# Patient Record
Sex: MALE | Race: WHITE | Employment: FULL TIME | URBAN - METROPOLITAN AREA
[De-identification: names, ages, dates, MRNs, and addresses within clinical notes are randomized per-mention and may not be internally consistent; named-entity substitution may affect disease eponyms.]

---

## 2017-05-02 ENCOUNTER — HOSPITAL ENCOUNTER (EMERGENCY)
Facility: CLINIC | Age: 47
Discharge: OTHER INSTITUTION | End: 2017-05-02
Attending: EMERGENCY MEDICINE
Payer: COMMERCIAL

## 2017-05-02 VITALS
OXYGEN SATURATION: 98 % | HEIGHT: 74 IN | RESPIRATION RATE: 16 BRPM | SYSTOLIC BLOOD PRESSURE: 142 MMHG | WEIGHT: 165 LBS | DIASTOLIC BLOOD PRESSURE: 78 MMHG | BODY MASS INDEX: 21.17 KG/M2 | HEART RATE: 76 BPM | TEMPERATURE: 98.6 F

## 2017-05-02 DIAGNOSIS — S40.021D HEMATOMA OF ARM, RIGHT, SUBSEQUENT ENCOUNTER: ICD-10-CM

## 2017-05-02 DIAGNOSIS — M25.421 ELBOW SWELLING, RIGHT: Primary | ICD-10-CM

## 2017-05-02 PROCEDURE — 6360000002 HC RX W HCPCS: Performed by: EMERGENCY MEDICINE

## 2017-05-02 PROCEDURE — 99284 EMERGENCY DEPT VISIT MOD MDM: CPT

## 2017-05-02 PROCEDURE — 96374 THER/PROPH/DIAG INJ IV PUSH: CPT

## 2017-05-02 RX ORDER — ONDANSETRON 4 MG/1
4 TABLET, ORALLY DISINTEGRATING ORAL ONCE
Status: COMPLETED | OUTPATIENT
Start: 2017-05-02 | End: 2017-05-02

## 2017-05-02 RX ORDER — MORPHINE SULFATE 2 MG/ML
2 INJECTION, SOLUTION INTRAMUSCULAR; INTRAVENOUS ONCE
Status: COMPLETED | OUTPATIENT
Start: 2017-05-02 | End: 2017-05-02

## 2017-05-02 RX ORDER — MORPHINE SULFATE 2 MG/ML
2 INJECTION, SOLUTION INTRAMUSCULAR; INTRAVENOUS ONCE
Status: DISCONTINUED | OUTPATIENT
Start: 2017-05-02 | End: 2017-05-02

## 2017-05-02 RX ADMIN — MORPHINE SULFATE 2 MG: 2 INJECTION, SOLUTION INTRAMUSCULAR; INTRAVENOUS at 20:03

## 2017-05-02 RX ADMIN — ONDANSETRON 4 MG: 4 TABLET, ORALLY DISINTEGRATING ORAL at 20:03

## 2017-05-02 ASSESSMENT — ENCOUNTER SYMPTOMS
GASTROINTESTINAL NEGATIVE: 1
RESPIRATORY NEGATIVE: 1
EYES NEGATIVE: 1

## 2017-05-02 ASSESSMENT — PAIN SCALES - GENERAL
PAINLEVEL_OUTOF10: 7

## 2017-05-02 ASSESSMENT — PAIN DESCRIPTION - LOCATION
LOCATION: ELBOW;ARM
LOCATION: ARM;ELBOW

## 2017-05-02 ASSESSMENT — PAIN DESCRIPTION - FREQUENCY
FREQUENCY: CONTINUOUS
FREQUENCY: CONTINUOUS

## 2017-05-02 ASSESSMENT — PAIN DESCRIPTION - PAIN TYPE
TYPE: ACUTE PAIN
TYPE: ACUTE PAIN

## 2017-05-02 ASSESSMENT — PAIN DESCRIPTION - DESCRIPTORS
DESCRIPTORS: THROBBING
DESCRIPTORS: SHARP;THROBBING

## 2017-05-02 ASSESSMENT — PAIN DESCRIPTION - ORIENTATION
ORIENTATION: RIGHT
ORIENTATION: RIGHT

## 2017-05-02 ASSESSMENT — PAIN DESCRIPTION - ONSET: ONSET: ON-GOING

## 2017-05-02 ASSESSMENT — PAIN DESCRIPTION - PROGRESSION: CLINICAL_PROGRESSION: NOT CHANGED

## 2017-05-07 ENCOUNTER — HOSPITAL ENCOUNTER (EMERGENCY)
Facility: CLINIC | Age: 47
Discharge: HOME | End: 2017-05-07
Attending: EMERGENCY MEDICINE
Payer: COMMERCIAL

## 2017-05-07 VITALS
TEMPERATURE: 98.2 F | HEIGHT: 74 IN | SYSTOLIC BLOOD PRESSURE: 140 MMHG | BODY MASS INDEX: 20.53 KG/M2 | WEIGHT: 160 LBS | HEART RATE: 113 BPM | DIASTOLIC BLOOD PRESSURE: 114 MMHG | RESPIRATION RATE: 20 BRPM | OXYGEN SATURATION: 99 %

## 2017-05-07 DIAGNOSIS — G56.91 NEUROPATHY OF FOREARM, RIGHT: Primary | ICD-10-CM

## 2017-05-07 PROCEDURE — 99283 EMERGENCY DEPT VISIT LOW MDM: CPT

## 2017-05-07 RX ORDER — CYCLOBENZAPRINE HCL 10 MG
10 TABLET ORAL 3 TIMES DAILY PRN
Qty: 30 TABLET | Refills: 0 | Status: SHIPPED | OUTPATIENT
Start: 2017-05-07 | End: 2017-05-17

## 2017-05-07 RX ORDER — PREDNISONE 20 MG/1
20 TABLET ORAL DAILY
Qty: 10 TABLET | Refills: 0 | Status: SHIPPED | OUTPATIENT
Start: 2017-05-07 | End: 2017-05-15

## 2017-05-07 RX ORDER — HYDROCODONE BITARTRATE AND ACETAMINOPHEN 5; 325 MG/1; MG/1
1 TABLET ORAL EVERY 6 HOURS PRN
Qty: 10 TABLET | Refills: 0 | Status: SHIPPED | OUTPATIENT
Start: 2017-05-07 | End: 2017-05-14

## 2017-05-07 ASSESSMENT — PAIN DESCRIPTION - PROGRESSION: CLINICAL_PROGRESSION: NOT CHANGED

## 2017-05-07 ASSESSMENT — PAIN DESCRIPTION - DESCRIPTORS: DESCRIPTORS: THROBBING;BURNING

## 2017-05-07 ASSESSMENT — PAIN DESCRIPTION - FREQUENCY: FREQUENCY: CONTINUOUS

## 2017-05-07 ASSESSMENT — PAIN SCALES - GENERAL: PAINLEVEL_OUTOF10: 10

## 2017-05-07 ASSESSMENT — PAIN DESCRIPTION - ONSET: ONSET: ON-GOING

## 2017-05-07 ASSESSMENT — PAIN DESCRIPTION - PAIN TYPE: TYPE: ACUTE PAIN

## 2017-05-07 ASSESSMENT — PAIN DESCRIPTION - ORIENTATION: ORIENTATION: RIGHT

## 2017-05-15 ENCOUNTER — HOSPITAL ENCOUNTER (EMERGENCY)
Facility: CLINIC | Age: 47
Discharge: HOME | End: 2017-05-15
Attending: EMERGENCY MEDICINE
Payer: COMMERCIAL

## 2017-05-15 VITALS
WEIGHT: 160 LBS | TEMPERATURE: 97.8 F | OXYGEN SATURATION: 97 % | SYSTOLIC BLOOD PRESSURE: 145 MMHG | BODY MASS INDEX: 20.53 KG/M2 | HEART RATE: 122 BPM | RESPIRATION RATE: 16 BRPM | DIASTOLIC BLOOD PRESSURE: 112 MMHG | HEIGHT: 74 IN

## 2017-05-15 DIAGNOSIS — M25.521 ELBOW PAIN, CHRONIC, RIGHT: Primary | ICD-10-CM

## 2017-05-15 DIAGNOSIS — G89.29 ELBOW PAIN, CHRONIC, RIGHT: Primary | ICD-10-CM

## 2017-05-15 PROCEDURE — 6360000002 HC RX W HCPCS: Performed by: EMERGENCY MEDICINE

## 2017-05-15 PROCEDURE — 96372 THER/PROPH/DIAG INJ SC/IM: CPT

## 2017-05-15 PROCEDURE — 99283 EMERGENCY DEPT VISIT LOW MDM: CPT

## 2017-05-15 RX ORDER — KETOROLAC TROMETHAMINE 30 MG/ML
60 INJECTION, SOLUTION INTRAMUSCULAR; INTRAVENOUS ONCE
Status: COMPLETED | OUTPATIENT
Start: 2017-05-15 | End: 2017-05-15

## 2017-05-15 RX ORDER — PREDNISONE 20 MG/1
20 TABLET ORAL 2 TIMES DAILY
Qty: 10 TABLET | Refills: 0 | Status: SHIPPED | OUTPATIENT
Start: 2017-05-15 | End: 2017-05-20

## 2017-05-15 RX ORDER — METHYLPREDNISOLONE ACETATE 40 MG/ML
80 INJECTION, SUSPENSION INTRA-ARTICULAR; INTRALESIONAL; INTRAMUSCULAR; SOFT TISSUE ONCE
Status: COMPLETED | OUTPATIENT
Start: 2017-05-15 | End: 2017-05-15

## 2017-05-15 RX ORDER — KETOROLAC TROMETHAMINE 30 MG/ML
60 INJECTION, SOLUTION INTRAMUSCULAR; INTRAVENOUS ONCE
Status: DISCONTINUED | OUTPATIENT
Start: 2017-05-15 | End: 2017-05-15

## 2017-05-15 RX ORDER — HYDROCODONE BITARTRATE AND ACETAMINOPHEN 5; 325 MG/1; MG/1
1 TABLET ORAL EVERY 6 HOURS PRN
Qty: 10 TABLET | Refills: 0 | Status: SHIPPED | OUTPATIENT
Start: 2017-05-15 | End: 2017-05-22

## 2017-05-15 RX ADMIN — KETOROLAC TROMETHAMINE 60 MG: 30 INJECTION, SOLUTION INTRAMUSCULAR at 13:37

## 2017-05-15 RX ADMIN — METHYLPREDNISOLONE ACETATE 80 MG: 40 INJECTION, SUSPENSION INTRA-ARTICULAR; INTRALESIONAL; INTRAMUSCULAR; SOFT TISSUE at 13:38

## 2017-05-15 ASSESSMENT — PAIN SCALES - GENERAL
PAINLEVEL_OUTOF10: 8
PAINLEVEL_OUTOF10: 10
PAINLEVEL_OUTOF10: 10

## 2017-05-15 ASSESSMENT — PAIN DESCRIPTION - PAIN TYPE
TYPE: ACUTE PAIN
TYPE: ACUTE PAIN

## 2017-05-15 ASSESSMENT — ENCOUNTER SYMPTOMS
RESPIRATORY NEGATIVE: 1
GASTROINTESTINAL NEGATIVE: 1
EYES NEGATIVE: 1

## 2017-05-15 ASSESSMENT — PAIN DESCRIPTION - LOCATION
LOCATION: ELBOW
LOCATION: ELBOW

## 2017-05-15 ASSESSMENT — PAIN DESCRIPTION - ORIENTATION: ORIENTATION: RIGHT

## 2017-05-15 NOTE — ED AVS SNAPSHOT
After Visit Summary  (Discharge Instructions)    Medication List for Home    Based on the information you provided to us as well as any changes during this visit, the following is your updated medication list.  Compare this with your prescription bottles at home. If you have any questions or concerns, contact your primary care physician's office. Daily Medication List (This medication list can be shared with any Healthcare provider who is helping you manage your medications)      There are NEW medications for you. START taking them after you leave the hospital     HYDROcodone-acetaminophen 5-325 MG per tablet   Commonly known as:  NORCO   Take 1 tablet by mouth every 6 hours as needed for Pain . You told us you were taking these medications at home, but the amount or how often you take this medication has CHANGED     predniSONE 20 MG tablet   Commonly known as:  DELTASONE   Take 1 tablet by mouth 2 times daily for 5 days   What changed:  when to take this         ASK your doctor about these medications if you have questions     albuterol (2.5 MG/3ML) 0.083% nebulizer solution   Commonly known as:  PROVENTIL   Take 2.5 mg by nebulization every 6 hours as needed for Wheezing       cyclobenzaprine 10 MG tablet   Commonly known as:  FLEXERIL   Take 1 tablet by mouth 3 times daily as needed for Muscle spasms       esomeprazole 40 MG delayed release capsule   Commonly known as:  NEXIUM   Take 1 capsule by mouth daily            Where to Get Your Medications      You can get these medications from any pharmacy     Bring a paper prescription for each of these medications     HYDROcodone-acetaminophen 5-325 MG per tablet    predniSONE 20 MG tablet               Allergies as of 5/15/2017     No Known Allergies      Immunizations as of 5/15/2017     Name Date Dose VIS Date Route    Tdap (Boostrix, Adacel) 9/7/2015 -- -- --         After Visit Summary    This summary was created for you. Thank you for entrusting your care to us. The following information includes details about your hospital/visit stay along with steps you should take to help with your recovery once you leave the hospital.  In this packet, you will find information about the topics listed below:    · Instructions about your medications including a list of your home medications  · A summary of your hospital visit  · Follow-up appointments once you have left the hospital  · Your care plan at home      You may receive a survey regarding the care you received during your stay. Your input is valuable to us. We encourage you to complete and return your survey in the envelope provided. We hope you will choose us in the future for your healthcare needs. Patient Information     Patient Name CORAL Ceballos 1970      Care Provided at:     Name             [address]              Your Visit    Here you will find information about your visit, including the reason for your visit. Please take this sheet with you when you visit your doctor or other health care provider in the future. It will help determine the best possible medical care for you at that time. If you have any questions once you leave the hospital, please call the department phone number listed below. Diagnoses this visit     Your diagnosis was ELBOW PAIN, CHRONIC, RIGHT. Visit Information     Date of Visit Department Dept Phone    5/15/2017 [facility] [phone]      You were seen by     You were seen by Liz Holt MD.       Follow-up Appointments    Below is a list of your follow-up and future appointments. This may not be a complete list as you may have made appointments directly with providers that we are not aware of or your providers may have made some for you. Please call your providers to confirm appointments. It is important to keep your appointments.  Please bring your current insurance card, photo ID, co-pay, and all medication bottles to your appointment. If self-pay, payment is expected at the time of service. Follow-up Information     Follow up with Isabella Goel M.D. In 2 days. Specialties:  Psychiatry, Neurology    Contact information:    [address] [facility] [address]  [address] [place] [address]  [phone]          Follow up with Jay Regalado MD In 2 days. Specialty:  Family Medicine    Contact information:    [address] [facility],[address]  [address] [facility] [phone]        Future Appointments     6/12/2017 1:15 PM     Appointment with Ashley Li MD at [facility] ([phone])   Please arrive 15 minutes prior to appointment, bring photo ID and insurance card. [address] Once You Return Home    This section includes instructions you will need to follow once you leave the hospital.  Your care team will discuss these with you, so you and those caring for you know how to best care for your health needs at home. This section may also include educational information about certain health topics that may be of help to you. Important Information if you smoke or are exposed to smoking       SMOKING: QUIT SMOKING. THIS IS THE MOST IMPORTANT ACTION YOU CAN TAKE TO IMPROVE YOUR CURRENT AND FUTURE HEALTH. Call the [address] [place] [address] at [place] NOW ([phone])    Smoking harms nonsmokers. When nonsmokers are around people who smoke, they absorb nicotine, carbon monoxide, and other ingredients of tobacco smoke. DO NOT SMOKE AROUND CHILDREN     Children exposed to secondhand smoke are at an increased risk of:  Sudden Infant Death Syndrome (SIDS), acute respiratory infections, inflammation of the middle ear, and severe asthma. Over a longer time, it causes heart disease and lung cancer. There is no safe level of exposure to secondhand smoke.                 MyChart Signup · Can disrupt your work, hobbies, home life, and relationships with friends and family. Chronic pain is a very real condition. It is not just in your head. Treatment can help and usually includes several methods used together, such as medicines, physical therapy, exercise, and other treatments. Learning how to relax and changing negative thought patterns can also help you cope. Chronic pain is complex. Taking an active role in your treatment will help you better manage your pain. Tell your doctor if you have trouble dealing with your pain. You may have to try several things before you find what works best for you. Follow-up care is a key part of your treatment and safety. Be sure to make and go to all appointments, and call your doctor if you are having problems. Its also a good idea to know your test results and keep a list of the medicines you take. How can you care for yourself at home? · Pace yourself. Break up large jobs into smaller tasks. Save harder tasks for days when you have less pain, or go back and forth between hard tasks and easier ones. Take rest breaks. · Relax, and reduce stress. Relaxation techniques such as deep breathing or meditation can help. · Keep moving. Gentle, daily exercise can help reduce pain over the long run. Try low- or no-impact exercises such as walking, swimming, and stationary biking. Do stretches to stay flexible. · Try heat, cold packs, and massage. · Get enough sleep. Chronic pain can make you tired and drain your energy. Talk with your doctor if you have trouble sleeping because of pain. · Think positive. Your thoughts can affect your pain level. Do things that you enjoy to distract yourself when you have pain instead of focusing on the pain. See a movie, read a book, listen to music, or spend time with a friend. · If you think you are depressed, talk to your doctor about treatment. · Keep a daily pain diary.  Record how your moods, thoughts, sleep ¨ Blood in the anal area, in your stool, or on the toilet paper. ¨ Diarrhea for more than 24 hours. Where can you learn more? Go to https://[employer]spepicAirInSpace.FoodFan. org and sign in to your [employer] account. Enter N004 in the [facility] box to learn more about \"Chronic Pain: Care Instructions. \"     If you do not have an account, please click on the \"Sign Up Now\" link. Current as of: October 14, 2016  Content Version: 11.2  © 2857-8188 [employer]. Care instructions adapted under license by [facility] ([facility]). If you have questions about a medical condition or this instruction, always ask your healthcare professional. [address] any warranty or liability for your use of this information. Joint Pain: Care Instructions  Your Care Instructions  Many people have small aches and pains from overuse or injury to muscles and joints. Joint injuries often happen during sports or recreation, work tasks, or projects around the home. An overuse injury can happen when you put too much stress on a joint or when you do an activity that stresses the joint over and over, such as using the computer or rowing a boat. You can take action at home to help your muscles and joints get better. You should feel better in 1 to 2 weeks, but it can take 3 months or more to heal completely. Follow-up care is a key part of your treatment and safety. Be sure to make and go to all appointments, and call your doctor if you are having problems. It's also a good idea to know your test results and keep a list of the medicines you take. How can you care for yourself at home? · Do not put weight on the injured joint for at least a day or two. · For the first day or two after an injury, do not take hot showers or baths, and do not use hot packs. The heat could make swelling worse. · Put ice or a cold pack on the sore joint for 10 to 20 minutes at a time. Try to do this every 1 to 2 hours for the next 3 days (when you are awake) or until the swelling goes down. Put a thin cloth between the ice and your skin. · Wrap the injury in an elastic bandage. Do not wrap it too tightly because this can cause more swelling. · Prop up the sore joint on a pillow when you ice it or anytime you sit or lie down during the next 3 days. Try to keep it above the level of your heart. This will help reduce swelling. · Take an over-the-counter pain medicine, such as acetaminophen (Tylenol), ibuprofen (Advil, Motrin), or naproxen (Aleve). Read and follow all instructions on the label. · After 1 or 2 days of rest, begin moving the joint gently. While the joint is still healing, you can begin to exercise using activities that do not strain or hurt the painful joint. When should you call for help? Call your doctor now or seek immediate medical care if:  · You have signs of infection, such as:  ¨ Increased pain, swelling, warmth, and redness. ¨ Red streaks leading from the joint. ¨ A fever. Watch closely for changes in your health, and be sure to contact your doctor if:  · Your movement or symptoms are not getting better after 1 to 2 weeks of home treatment. Where can you learn more? Go to https://ISORG."[employer]. org and sign in to your spotflux account. Enter P205 in the [facility] box to learn more about \"Joint Pain: Care Instructions. \"     If you do not have an account, please click on the \"Sign Up Now\" link. Current as of: May 23, 2016  Content Version: 11.2  © 4048-8937 [employer]. Care instructions adapted under license by [facility] ([facility]). If you have questions about a medical condition or this instruction, always ask your healthcare professional. [address]  any warranty or liability for your use of this information.          Learning About Safe Use of Long-Acting Opioids  Introduction ¨ Do not take opioids with other medicines that make you sleepy or relaxed. Taking both can be dangerous. · Talk to your doctor about a naloxone rescue kit. A kit can help you, and even save your life, if you take too much of an opioid. Possible side effects  All medicines have side effects. But many people don't feel the side effects, or they are able to deal with them. You may:  · Feel confused or have a hard time thinking clearly. · Be constipated. · Feel faint, dizzy, or lightheaded. · Feel drowsy. · Feel sick to your stomach or vomit. · Have an allergic reaction. What to know about taking this medicine  · When you take an opioid regularly, your body gets used to it. This can lead to tolerance and physical dependence. These are not the same as addiction. ¨ Tolerance means that, over time, you may need to take more of the drug to keep getting the same amount of pain relief. The danger is that tolerance greatly increases your risk of overdose, breathing emergencies, and death. ¨ If you are physically dependent on an opioid, you may have withdrawal symptoms when you stop taking it. These include nausea, sweating, chills, diarrhea, and shaking. You can avoid these symptoms if you gradually stop taking the opioid over a set period of time. Your doctor can help you. ¨ Addiction is a chronic illness that makes you crave a substance, such as a drug or alcohol. When you are addicted to a substance, you have a hard time stopping yourself from using it even when you can see it causes harm. · You have a small risk of addiction when you take opioids. Your risk is greater if you have a history of substance use problems. Others who are more at risk for addiction are teenagers, older adults, people who have depression, and those who take high doses of medicine. · Ask for written instructions from your doctor or pharmacist about how to safely get rid of any medicine that's left over.

## 2017-06-12 ENCOUNTER — OFFICE VISIT (OUTPATIENT)
Dept: GASTROENTEROLOGY | Age: 47
End: 2017-06-12
Payer: COMMERCIAL

## 2017-06-12 VITALS
DIASTOLIC BLOOD PRESSURE: 78 MMHG | HEART RATE: 88 BPM | RESPIRATION RATE: 14 BRPM | OXYGEN SATURATION: 99 % | TEMPERATURE: 98.1 F | WEIGHT: 161.8 LBS | SYSTOLIC BLOOD PRESSURE: 108 MMHG | BODY MASS INDEX: 20.76 KG/M2 | HEIGHT: 74 IN

## 2017-06-12 DIAGNOSIS — K44.9 HIATAL HERNIA: ICD-10-CM

## 2017-06-12 DIAGNOSIS — K21.9 GASTROESOPHAGEAL REFLUX DISEASE WITHOUT ESOPHAGITIS: Primary | ICD-10-CM

## 2017-06-12 PROCEDURE — 1036F TOBACCO NON-USER: CPT | Performed by: INTERNAL MEDICINE

## 2017-06-12 PROCEDURE — G8427 DOCREV CUR MEDS BY ELIG CLIN: HCPCS | Performed by: INTERNAL MEDICINE

## 2017-06-12 PROCEDURE — 99214 OFFICE O/P EST MOD 30 MIN: CPT | Performed by: INTERNAL MEDICINE

## 2017-06-12 PROCEDURE — G8420 CALC BMI NORM PARAMETERS: HCPCS | Performed by: INTERNAL MEDICINE

## 2017-06-12 RX ORDER — ESOMEPRAZOLE MAGNESIUM 40 MG/1
40 CAPSULE, DELAYED RELEASE ORAL DAILY
Qty: 90 CAPSULE | Refills: 5 | Status: SHIPPED | OUTPATIENT
Start: 2017-06-12 | End: 2017-12-26

## 2017-06-12 RX ORDER — MONTELUKAST SODIUM 10 MG/1
10 TABLET ORAL
COMMUNITY
Start: 2017-03-11

## 2017-06-12 ASSESSMENT — ENCOUNTER SYMPTOMS
GASTROINTESTINAL NEGATIVE: 1
DIARRHEA: 0
NAUSEA: 0
ABDOMINAL PAIN: 0
EYES NEGATIVE: 1
RECTAL PAIN: 0
RESPIRATORY NEGATIVE: 1
TROUBLE SWALLOWING: 0
BLOOD IN STOOL: 0
VOMITING: 0
ANAL BLEEDING: 0
ABDOMINAL DISTENTION: 0
CONSTIPATION: 0
ALLERGIC/IMMUNOLOGIC NEGATIVE: 1

## 2017-10-24 ENCOUNTER — OFFICE VISIT (OUTPATIENT)
Dept: GASTROENTEROLOGY | Age: 47
End: 2017-10-24
Payer: COMMERCIAL

## 2017-10-24 VITALS
RESPIRATION RATE: 12 BRPM | WEIGHT: 166.4 LBS | HEART RATE: 70 BPM | SYSTOLIC BLOOD PRESSURE: 115 MMHG | BODY MASS INDEX: 21.36 KG/M2 | HEIGHT: 74 IN | DIASTOLIC BLOOD PRESSURE: 79 MMHG | OXYGEN SATURATION: 98 %

## 2017-10-24 DIAGNOSIS — K20.0 EE (EOSINOPHILIC ESOPHAGITIS): ICD-10-CM

## 2017-10-24 DIAGNOSIS — K21.9 GASTROESOPHAGEAL REFLUX DISEASE WITHOUT ESOPHAGITIS: Primary | ICD-10-CM

## 2017-10-24 PROCEDURE — G8484 FLU IMMUNIZE NO ADMIN: HCPCS | Performed by: INTERNAL MEDICINE

## 2017-10-24 PROCEDURE — G8427 DOCREV CUR MEDS BY ELIG CLIN: HCPCS | Performed by: INTERNAL MEDICINE

## 2017-10-24 PROCEDURE — 99214 OFFICE O/P EST MOD 30 MIN: CPT | Performed by: INTERNAL MEDICINE

## 2017-10-24 PROCEDURE — G8420 CALC BMI NORM PARAMETERS: HCPCS | Performed by: INTERNAL MEDICINE

## 2017-10-24 PROCEDURE — 1036F TOBACCO NON-USER: CPT | Performed by: INTERNAL MEDICINE

## 2017-10-24 RX ORDER — OMEPRAZOLE 20 MG/1
20 CAPSULE, DELAYED RELEASE ORAL
Qty: 90 CAPSULE | Refills: 3 | Status: SHIPPED | OUTPATIENT
Start: 2017-10-24 | End: 2018-12-03 | Stop reason: SDUPTHER

## 2017-10-24 ASSESSMENT — ENCOUNTER SYMPTOMS
EYES NEGATIVE: 1
TROUBLE SWALLOWING: 0
ABDOMINAL PAIN: 0
GASTROINTESTINAL NEGATIVE: 1
RECTAL PAIN: 0
RESPIRATORY NEGATIVE: 1
ABDOMINAL DISTENTION: 0
ALLERGIC/IMMUNOLOGIC NEGATIVE: 1
CONSTIPATION: 0
NAUSEA: 0
BLOOD IN STOOL: 0
DIARRHEA: 0
ANAL BLEEDING: 0
VOMITING: 0

## 2017-10-24 NOTE — PROGRESS NOTES
Subjective:      Patient ID: Sundeep Moncada is a 52 y.o. male. HPI   Dr. Amanda Friedman MD our mutual patient Sundeep Moncada was seen  for   1. Gastroesophageal reflux disease without esophagitis    2. EE (eosinophilic esophagitis)     . He is here for f/u  He has above issues  He has hx of choking and was found to have EE in June 2016  He has been on PPi but has stopped taking it because of fear  Of side effects  He has some issues with sour stomach   No bleeding or any melena    Past Medical, Family, and Social History reviewed and does contribute to the patient presenting condition. patient\"s PMH/PSH,SH,PSYCH hx, MEDs, ALLERGIES, and ROS was all reviewed and updated ion the appropriate sections      Review of Systems   Constitutional: Negative. HENT: Negative. Negative for trouble swallowing. Eyes: Negative. Respiratory: Negative. Cardiovascular: Negative. Gastrointestinal: Negative. Negative for abdominal distention, abdominal pain, anal bleeding, blood in stool, constipation, diarrhea, nausea, rectal pain and vomiting. Vanesa Nation - no issues   Endocrine: Negative. Genitourinary: Negative. Musculoskeletal: Negative. Skin: Negative. Allergic/Immunologic: Negative. Neurological: Negative. Hematological: Negative. Psychiatric/Behavioral: Negative. Reviewed and agree  Objective:   Physical Exam   Constitutional: He is oriented to person, place, and time. He appears well-developed and well-nourished. HENT:   Head: Normocephalic and atraumatic. Eyes: Conjunctivae and EOM are normal. Pupils are equal, round, and reactive to light. Neck: Normal range of motion. Neck supple. Cardiovascular: Normal rate and regular rhythm. Pulmonary/Chest: Effort normal and breath sounds normal.   Abdominal: Soft.  Bowel sounds are normal.   NON TENDER, NON DISTENTED  LIVER SPLEEN AND HERNIAS ARE NOT  PALPABLE  BOWEL SOUNDS ARE POSITIVE      Genitourinary:

## 2017-10-27 ENCOUNTER — APPOINTMENT (OUTPATIENT)
Dept: GENERAL RADIOLOGY | Age: 47
End: 2017-10-27
Payer: COMMERCIAL

## 2017-10-27 ENCOUNTER — HOSPITAL ENCOUNTER (EMERGENCY)
Age: 47
Discharge: HOME | End: 2017-10-27
Attending: EMERGENCY MEDICINE
Payer: COMMERCIAL

## 2017-10-27 VITALS
SYSTOLIC BLOOD PRESSURE: 140 MMHG | WEIGHT: 162 LBS | RESPIRATION RATE: 16 BRPM | BODY MASS INDEX: 20.79 KG/M2 | HEART RATE: 82 BPM | TEMPERATURE: 98.2 F | HEIGHT: 74 IN | OXYGEN SATURATION: 99 % | DIASTOLIC BLOOD PRESSURE: 89 MMHG

## 2017-10-27 DIAGNOSIS — M25.561 ACUTE PAIN OF RIGHT KNEE: Primary | ICD-10-CM

## 2017-10-27 PROCEDURE — 73562 X-RAY EXAM OF KNEE 3: CPT

## 2017-10-27 PROCEDURE — 6370000000 HC RX 637 (ALT 250 FOR IP): Performed by: EMERGENCY MEDICINE

## 2017-10-27 PROCEDURE — 99283 EMERGENCY DEPT VISIT LOW MDM: CPT

## 2017-10-27 RX ORDER — IBUPROFEN 600 MG/1
600 TABLET ORAL ONCE
Status: COMPLETED | OUTPATIENT
Start: 2017-10-27 | End: 2017-10-27

## 2017-10-27 RX ORDER — IBUPROFEN 800 MG/1
800 TABLET ORAL EVERY 8 HOURS PRN
Qty: 30 TABLET | Refills: 0 | Status: SHIPPED | OUTPATIENT
Start: 2017-10-27 | End: 2018-04-25

## 2017-10-27 RX ADMIN — IBUPROFEN 600 MG: 600 TABLET, FILM COATED ORAL at 00:30

## 2017-10-27 ASSESSMENT — PAIN SCALES - GENERAL
PAINLEVEL_OUTOF10: 8
PAINLEVEL_OUTOF10: 8

## 2017-10-27 ASSESSMENT — PAIN DESCRIPTION - FREQUENCY: FREQUENCY: CONTINUOUS

## 2017-10-27 ASSESSMENT — PAIN DESCRIPTION - PAIN TYPE
TYPE: ACUTE PAIN
TYPE: ACUTE PAIN

## 2017-10-27 ASSESSMENT — PAIN DESCRIPTION - ORIENTATION
ORIENTATION: RIGHT
ORIENTATION: RIGHT

## 2017-10-27 ASSESSMENT — PAIN DESCRIPTION - LOCATION
LOCATION: KNEE
LOCATION: KNEE

## 2017-10-27 ASSESSMENT — PAIN DESCRIPTION - DESCRIPTORS
DESCRIPTORS: ACHING
DESCRIPTORS: ACHING

## 2017-10-27 NOTE — ED PROVIDER NOTES
[facility]  [address] [address]  Phone: [phone]  Fax: [phone]        Pt Name: Magalie Santos  MRN: [id number]  [place] 1970  Date of evaluation: 10/27/17      CHIEF COMPLAINT       Chief Complaint   Patient presents with    Knee Pain         HISTORY OF PRESENT ILLNESS  (Location/Symptom, Timing/Onset, Context/Setting, Quality, Duration, Modifying Factors, Severity.)    Magalie Santos is a 52 y.o. male who presents With right knee pain. The patient states approximately 5 hours prior to arrival.  He hit his right knee on a heel while at work. The patient is complaining of pain and swelling to the right knee. It is worse with ambulation, worse with pressure to the right knee. No previous injury no other injuries. No numbness no weakness. It is a sharp pain of which he has not taken anything for the discomfort and he rates the pain as an 8 out of 10       REVIEW OF SYSTEMS    (2-9 systems for level 4, 10 or more for level 5)     Review of Systems   Musculoskeletal:        Right knee pain   Skin:        Swelling to right knee   Neurological: Negative for weakness and numbness. PAST MEDICAL HISTORY    has a past medical history of Asthma; Discogenic syndrome, lumbar; Gastritis; GERD (gastroesophageal reflux disease); Hematuria; Hiatal hernia; Male pattern baldness; Mandibular retrognathism; Maxillary hyperplasia; Penile lesion; Prepatellar bursitis; Prostatic congestion; Reactive airway disease; and Tension headache. SURGICAL HISTORY      has a past surgical history that includes Upper gastrointestinal endoscopy; Colonoscopy; Inguinal hernia repair (Right, 12/19/13); Nose surgery; Ankle fracture surgery; Mandible surgery; Appendectomy; Abdomen surgery; and Upper gastrointestinal endoscopy (06/08/2016).     CURRENT MEDICATIONS       Previous Medications    ADVAIR DISKUS 100-50 MCG/DOSE DISKUS INHALER        ESOMEPRAZOLE (NEXIUM) 40 MG DELAYED RELEASE CAPSULE    Take 1 capsule by mouth daily    FEXOFENADINE-PSEUDOEPHEDRINE (ALLEGRA-D 24 HOUR PO)    Take 1 tablet by mouth    MONTELUKAST (SINGULAIR) 10 MG TABLET        OMEPRAZOLE (PRILOSEC) 20 MG DELAYED RELEASE CAPSULE    Take 1 capsule by mouth every morning (before breakfast)       ALLERGIES     has No Known Allergies. FAMILY HISTORY     indicated that his mother is alive. He indicated that his father is alive. He indicated that the status of his other is unknown.      family history includes Arthritis in an other family member; Asthma in an other family member. SOCIAL HISTORY      reports that he has never smoked. He has never used smokeless tobacco. He reports that he drinks alcohol. He reports that he does not use drugs. PHYSICAL EXAM    (up to 7 for level 4, 8 or more for level 5)   INITIAL VITALS:  height is 6' 2\" (1.88 m) and weight is 73.5 kg (162 lb). His oral temperature is 98.2 °F (36.8 °C). His blood pressure is 140/89 (abnormal) and his pulse is 82. His respiration is 16 and oxygen saturation is 99%. Physical Exam   Constitutional: He appears well-developed and well-nourished. HENT:   Head: Normocephalic and atraumatic. Neck: Normal range of motion. Neck supple. Musculoskeletal: Normal range of motion. The patient is noted to have swelling and tenderness to the right patellar region. No gross laxity of the knee appreciated. No other bony point tenderness appreciated with good pulses motor sensation in the right lower extremity   Neurological: He is alert. No focal deficits are appreciated   Skin:   Swelling to the right patellar region. Otherwise without further rashes or lesions   Psychiatric: He has a normal mood and affect. Nursing note and vitals reviewed.       DIFFERENTIAL DIAGNOSIS/ MDM:     I will give the patient some motrin and get an xray of the right knee    DIAGNOSTIC RESULTS         RADIOLOGY:   Non-plain film images such as CT, Ultrasound and family/guardian. I have answered their questions and given discharge instructions. They voiced understanding of these instructions and did not have any further questions or complaints. PROCEDURES:  None    FINAL IMPRESSION      1. Acute pain of right knee          DISPOSITION/PLAN   DISPOSITION Decision to Discharge    CONDITION ON DISPOSITION:   Stable    PATIENT REFERRED TO:    occupational medicine  Call today        DISCHARGE MEDICATIONS:  New Prescriptions    IBUPROFEN (ADVIL;MOTRIN) 800 MG TABLET    Take 1 tablet by mouth every 8 hours as needed for Pain       (Please note that portions of this note were completed with a voice recognition program.  Efforts were made to edit the dictations but occasionally words are mis-transcribed.)    Sánchez MD, F.A.C.E.P.   Attending Emergency Medicine Physician       Clarissa Carrillo MD  10/27/17 [id number]

## 2017-12-11 ENCOUNTER — OFFICE VISIT (OUTPATIENT)
Dept: FAMILY MEDICINE CLINIC | Age: 47
End: 2017-12-11
Payer: COMMERCIAL

## 2017-12-11 VITALS
OXYGEN SATURATION: 96 % | HEART RATE: 68 BPM | DIASTOLIC BLOOD PRESSURE: 82 MMHG | TEMPERATURE: 98.1 F | HEIGHT: 74 IN | RESPIRATION RATE: 20 BRPM | SYSTOLIC BLOOD PRESSURE: 132 MMHG | WEIGHT: 163.6 LBS | BODY MASS INDEX: 21 KG/M2

## 2017-12-11 DIAGNOSIS — G89.29 CHRONIC BILATERAL THORACIC BACK PAIN: ICD-10-CM

## 2017-12-11 DIAGNOSIS — M54.50 CHRONIC MIDLINE LOW BACK PAIN WITHOUT SCIATICA: Primary | ICD-10-CM

## 2017-12-11 DIAGNOSIS — M54.6 CHRONIC BILATERAL THORACIC BACK PAIN: ICD-10-CM

## 2017-12-11 DIAGNOSIS — G89.29 CHRONIC MIDLINE LOW BACK PAIN WITHOUT SCIATICA: Primary | ICD-10-CM

## 2017-12-11 PROCEDURE — G8420 CALC BMI NORM PARAMETERS: HCPCS | Performed by: NURSE PRACTITIONER

## 2017-12-11 PROCEDURE — G8427 DOCREV CUR MEDS BY ELIG CLIN: HCPCS | Performed by: NURSE PRACTITIONER

## 2017-12-11 PROCEDURE — G8484 FLU IMMUNIZE NO ADMIN: HCPCS | Performed by: NURSE PRACTITIONER

## 2017-12-11 PROCEDURE — 99214 OFFICE O/P EST MOD 30 MIN: CPT | Performed by: NURSE PRACTITIONER

## 2017-12-11 PROCEDURE — 1036F TOBACCO NON-USER: CPT | Performed by: NURSE PRACTITIONER

## 2017-12-11 RX ORDER — CYCLOBENZAPRINE HCL 10 MG
10 TABLET ORAL 3 TIMES DAILY PRN
Qty: 30 TABLET | Refills: 1 | Status: SHIPPED | OUTPATIENT
Start: 2017-12-11 | End: 2018-04-25

## 2017-12-11 RX ORDER — CYCLOBENZAPRINE HCL 10 MG
10 TABLET ORAL
COMMUNITY
Start: 2017-12-10 | End: 2017-12-11 | Stop reason: SDUPTHER

## 2017-12-11 RX ORDER — PREDNISONE 50 MG/1
50 TABLET ORAL
COMMUNITY
Start: 2017-12-10 | End: 2017-12-14

## 2017-12-11 RX ORDER — NAPROXEN 500 MG/1
TABLET ORAL
COMMUNITY
Start: 2017-10-27 | End: 2018-04-25

## 2017-12-11 ASSESSMENT — ENCOUNTER SYMPTOMS
COUGH: 0
ABDOMINAL PAIN: 0
BACK PAIN: 1
NAUSEA: 0
SHORTNESS OF BREATH: 0

## 2017-12-11 ASSESSMENT — PATIENT HEALTH QUESTIONNAIRE - PHQ9
SUM OF ALL RESPONSES TO PHQ QUESTIONS 1-9: 0
2. FEELING DOWN, DEPRESSED OR HOPELESS: 0
1. LITTLE INTEREST OR PLEASURE IN DOING THINGS: 0
SUM OF ALL RESPONSES TO PHQ9 QUESTIONS 1 & 2: 0

## 2017-12-11 NOTE — LETTER
[facility]  [address]  [address]  [address] [facility] [address]  Phone: [phone]    Silverio Baugh, [address]         December 11, 2017         Patient: Sangeetha Anguiano   YOB: 1970   Date of Visit: 12/11/2017       To Whom It May Concern: Satish Burnett has been under my care from 12-11-17 through 12-12-17 and may return to work on  12-13-17. If you have any questions or concerns, please don't hesitate to call.       Sincerely,        Silverio Baugh, CNP

## 2017-12-11 NOTE — PROGRESS NOTES
Subjective:      Patient ID: Marbella Ramos is a 52 y.o. male. HPI hasn't been in for 2 years. 52year old white male presents with management of acute on chronic lower back pain and upper back pain. Has had lower back pain for years and pain is getting worse for the past 4 days. Describes as burning constant worse with movements. Had his friend cracked his back which made it worse. States he was hardly able to move yesterday. Was evaluated in an urgent care yesterday and XR lumbar and thoracic showed degenerative changes L5- S1 with L5 spondylolysis. Denies paresthesias or focal weakness. Currently is on flexeril oral steroid and tylenol with moderate relief. Hx of right knee arthritis. Review of Systems   Constitutional: Negative for chills and fever. Respiratory: Negative for cough and shortness of breath. Cardiovascular: Negative for chest pain and palpitations. Gastrointestinal: Negative for abdominal pain and nausea. Musculoskeletal: Positive for back pain. Negative for gait problem. Neurological: Negative for dizziness, weakness and numbness. Objective:   Physical Exam   Constitutional: He appears well-nourished. No distress. HENT:   Nose: Nose normal.   Mouth/Throat: Oropharynx is clear and moist.   Eyes: Conjunctivae are normal.   Neck: Neck supple. Cardiovascular: Normal rate, regular rhythm and normal heart sounds. Pulmonary/Chest: Effort normal and breath sounds normal. No respiratory distress. Abdominal: Soft. There is no tenderness. Musculoskeletal: He exhibits tenderness. He exhibits no edema. Thoracic back: He exhibits tenderness and spasm. He exhibits normal range of motion, no swelling and no deformity. Back:    SLR tests negative bilaterally. Distal sensation intact, cap refill wnl, DP 2+. Lymphadenopathy:     He has no cervical adenopathy. Neurological: He is alert. No cranial nerve deficit. Skin: Skin is warm and dry. and exercise  Reviewed prior labs and health maintenance. Continue current medications, diet and exercise. Discussed use, benefit, and side effects of prescribed medications. Barriers to medication compliance addressed. Patient given educational materials - see patient instructions. All patient questions answered. Patient voiced understanding.

## 2017-12-26 ENCOUNTER — OFFICE VISIT (OUTPATIENT)
Dept: FAMILY MEDICINE CLINIC | Age: 47
End: 2017-12-26
Payer: COMMERCIAL

## 2017-12-26 VITALS — DIASTOLIC BLOOD PRESSURE: 80 MMHG | RESPIRATION RATE: 14 BRPM | HEART RATE: 70 BPM | SYSTOLIC BLOOD PRESSURE: 110 MMHG

## 2017-12-26 DIAGNOSIS — G89.29 CHRONIC RIGHT-SIDED LOW BACK PAIN WITH RIGHT-SIDED SCIATICA: Primary | ICD-10-CM

## 2017-12-26 DIAGNOSIS — M54.41 CHRONIC RIGHT-SIDED LOW BACK PAIN WITH RIGHT-SIDED SCIATICA: Primary | ICD-10-CM

## 2017-12-26 DIAGNOSIS — M54.10 RADICULAR PAIN: ICD-10-CM

## 2017-12-26 PROCEDURE — 99214 OFFICE O/P EST MOD 30 MIN: CPT | Performed by: NURSE PRACTITIONER

## 2017-12-26 PROCEDURE — G8484 FLU IMMUNIZE NO ADMIN: HCPCS | Performed by: NURSE PRACTITIONER

## 2017-12-26 PROCEDURE — G8420 CALC BMI NORM PARAMETERS: HCPCS | Performed by: NURSE PRACTITIONER

## 2017-12-26 PROCEDURE — G8427 DOCREV CUR MEDS BY ELIG CLIN: HCPCS | Performed by: NURSE PRACTITIONER

## 2017-12-26 PROCEDURE — 1036F TOBACCO NON-USER: CPT | Performed by: NURSE PRACTITIONER

## 2017-12-26 ASSESSMENT — ENCOUNTER SYMPTOMS
SHORTNESS OF BREATH: 0
BACK PAIN: 1
NAUSEA: 0
ABDOMINAL PAIN: 0
COUGH: 0

## 2018-01-12 ENCOUNTER — TELEPHONE (OUTPATIENT)
Dept: FAMILY MEDICINE CLINIC | Age: 48
End: 2018-01-12

## 2018-01-12 DIAGNOSIS — R07.9 CHEST PAIN, UNSPECIFIED TYPE: Primary | ICD-10-CM

## 2018-01-16 ENCOUNTER — TELEPHONE (OUTPATIENT)
Dept: FAMILY MEDICINE CLINIC | Age: 48
End: 2018-01-16

## 2018-01-16 NOTE — TELEPHONE ENCOUNTER
[facility] CALLED STATING THAT PATIENT'S MRI WAS DENIED THROUGH [facility] THE NUMBER FOR [facility] IS [phone]. PLEASE ADVISE.

## 2018-04-25 ENCOUNTER — OFFICE VISIT (OUTPATIENT)
Dept: GASTROENTEROLOGY | Age: 48
End: 2018-04-25
Payer: COMMERCIAL

## 2018-04-25 VITALS
DIASTOLIC BLOOD PRESSURE: 83 MMHG | SYSTOLIC BLOOD PRESSURE: 123 MMHG | BODY MASS INDEX: 21.44 KG/M2 | HEART RATE: 74 BPM | WEIGHT: 167 LBS

## 2018-04-25 DIAGNOSIS — K21.9 GASTROESOPHAGEAL REFLUX DISEASE WITHOUT ESOPHAGITIS: Primary | ICD-10-CM

## 2018-04-25 DIAGNOSIS — K20.0 EE (EOSINOPHILIC ESOPHAGITIS): ICD-10-CM

## 2018-04-25 DIAGNOSIS — F41.9 ANXIETY: ICD-10-CM

## 2018-04-25 PROCEDURE — G8427 DOCREV CUR MEDS BY ELIG CLIN: HCPCS | Performed by: INTERNAL MEDICINE

## 2018-04-25 PROCEDURE — G8420 CALC BMI NORM PARAMETERS: HCPCS | Performed by: INTERNAL MEDICINE

## 2018-04-25 PROCEDURE — 99214 OFFICE O/P EST MOD 30 MIN: CPT | Performed by: INTERNAL MEDICINE

## 2018-04-25 PROCEDURE — 1036F TOBACCO NON-USER: CPT | Performed by: INTERNAL MEDICINE

## 2018-04-25 ASSESSMENT — ENCOUNTER SYMPTOMS
EYES NEGATIVE: 1
BLOOD IN STOOL: 0
ABDOMINAL PAIN: 0
VOMITING: 0
TROUBLE SWALLOWING: 0
DIARRHEA: 0
ABDOMINAL DISTENTION: 0
NAUSEA: 0
ALLERGIC/IMMUNOLOGIC NEGATIVE: 1
RESPIRATORY NEGATIVE: 1
ANAL BLEEDING: 0
CONSTIPATION: 0
RECTAL PAIN: 0
GASTROINTESTINAL NEGATIVE: 1

## 2018-10-29 ENCOUNTER — OFFICE VISIT (OUTPATIENT)
Dept: ORTHOPEDIC SURGERY | Age: 48
End: 2018-10-29
Payer: COMMERCIAL

## 2018-10-29 VITALS
DIASTOLIC BLOOD PRESSURE: 80 MMHG | HEART RATE: 87 BPM | WEIGHT: 167.11 LBS | HEIGHT: 74 IN | BODY MASS INDEX: 21.45 KG/M2 | SYSTOLIC BLOOD PRESSURE: 113 MMHG

## 2018-10-29 DIAGNOSIS — S82.192A OTHER CLOSED FRACTURE OF PROXIMAL END OF LEFT TIBIA, INITIAL ENCOUNTER: Primary | ICD-10-CM

## 2018-10-29 PROCEDURE — G8427 DOCREV CUR MEDS BY ELIG CLIN: HCPCS | Performed by: ORTHOPAEDIC SURGERY

## 2018-10-29 PROCEDURE — G8420 CALC BMI NORM PARAMETERS: HCPCS | Performed by: ORTHOPAEDIC SURGERY

## 2018-10-29 PROCEDURE — G8484 FLU IMMUNIZE NO ADMIN: HCPCS | Performed by: ORTHOPAEDIC SURGERY

## 2018-10-29 PROCEDURE — 99203 OFFICE O/P NEW LOW 30 MIN: CPT | Performed by: ORTHOPAEDIC SURGERY

## 2018-10-29 PROCEDURE — 1036F TOBACCO NON-USER: CPT | Performed by: ORTHOPAEDIC SURGERY

## 2018-10-29 RX ORDER — IBUPROFEN 600 MG/1
600 TABLET ORAL
COMMUNITY
Start: 2018-10-27 | End: 2018-11-26

## 2018-10-29 RX ORDER — AZITHROMYCIN 250 MG/1
TABLET, FILM COATED ORAL
Refills: 0 | COMMUNITY
Start: 2018-10-24 | End: 2018-10-31

## 2018-10-29 RX ORDER — METHYLPREDNISOLONE 4 MG/1
TABLET ORAL
Refills: 0 | COMMUNITY
Start: 2018-10-24 | End: 2018-10-31
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
## 2018-10-29 NOTE — LETTER
[address] [place] [address] and Sports Medicine  [address] [address]  Phone: [phone]  Fax: [phone]    Tushar Richardson MD        October 29, 2018     Patient: Christopher Henley   YOB: 1970   Date of Visit: 10/29/2018       To Whom It May Concern: It is my medical opinion that Kevin Raymundo  Is totally disabled  And unable to work from 10-29-18 through 11-5-18. If you have any questions or concerns, please don't hesitate to call.     Sincerely,        Tushar Richardson MD

## 2018-10-31 ENCOUNTER — OFFICE VISIT (OUTPATIENT)
Dept: GASTROENTEROLOGY | Age: 48
End: 2018-10-31
Payer: COMMERCIAL

## 2018-10-31 VITALS
SYSTOLIC BLOOD PRESSURE: 115 MMHG | HEART RATE: 75 BPM | WEIGHT: 167.2 LBS | DIASTOLIC BLOOD PRESSURE: 80 MMHG | BODY MASS INDEX: 21.46 KG/M2

## 2018-10-31 DIAGNOSIS — F41.9 ANXIETY: ICD-10-CM

## 2018-10-31 DIAGNOSIS — K21.9 GASTROESOPHAGEAL REFLUX DISEASE WITHOUT ESOPHAGITIS: Primary | ICD-10-CM

## 2018-10-31 DIAGNOSIS — K20.0 EE (EOSINOPHILIC ESOPHAGITIS): ICD-10-CM

## 2018-10-31 PROCEDURE — G8420 CALC BMI NORM PARAMETERS: HCPCS | Performed by: INTERNAL MEDICINE

## 2018-10-31 PROCEDURE — 1036F TOBACCO NON-USER: CPT | Performed by: INTERNAL MEDICINE

## 2018-10-31 PROCEDURE — 99214 OFFICE O/P EST MOD 30 MIN: CPT | Performed by: INTERNAL MEDICINE

## 2018-10-31 PROCEDURE — G8427 DOCREV CUR MEDS BY ELIG CLIN: HCPCS | Performed by: INTERNAL MEDICINE

## 2018-10-31 PROCEDURE — G8484 FLU IMMUNIZE NO ADMIN: HCPCS | Performed by: INTERNAL MEDICINE

## 2018-10-31 ASSESSMENT — ENCOUNTER SYMPTOMS
GASTROINTESTINAL NEGATIVE: 1
NAUSEA: 0
TROUBLE SWALLOWING: 0
ALLERGIC/IMMUNOLOGIC NEGATIVE: 1
DIARRHEA: 0
ABDOMINAL DISTENTION: 0
EYES NEGATIVE: 1
CONSTIPATION: 0
BLOOD IN STOOL: 0
ANAL BLEEDING: 0
RESPIRATORY NEGATIVE: 1
VOMITING: 0
RECTAL PAIN: 0
ABDOMINAL PAIN: 0

## 2018-11-01 ENCOUNTER — HOSPITAL ENCOUNTER (OUTPATIENT)
Dept: MRI IMAGING | Facility: CLINIC | Age: 48
Discharge: HOME | End: 2018-11-03
Payer: COMMERCIAL

## 2018-11-01 DIAGNOSIS — S82.192A OTHER CLOSED FRACTURE OF PROXIMAL END OF LEFT TIBIA, INITIAL ENCOUNTER: ICD-10-CM

## 2018-11-01 PROCEDURE — 73718 MRI LOWER EXTREMITY W/O DYE: CPT

## 2018-11-05 ENCOUNTER — OFFICE VISIT (OUTPATIENT)
Dept: ORTHOPEDIC SURGERY | Age: 48
End: 2018-11-05
Payer: COMMERCIAL

## 2018-11-05 VITALS
BODY MASS INDEX: 21.17 KG/M2 | WEIGHT: 165 LBS | DIASTOLIC BLOOD PRESSURE: 101 MMHG | HEART RATE: 95 BPM | SYSTOLIC BLOOD PRESSURE: 146 MMHG | HEIGHT: 74 IN

## 2018-11-05 DIAGNOSIS — S80.02XD CONTUSION OF LEFT KNEE AND LOWER LEG, SUBSEQUENT ENCOUNTER: Primary | ICD-10-CM

## 2018-11-05 DIAGNOSIS — S80.12XD CONTUSION OF LEFT KNEE AND LOWER LEG, SUBSEQUENT ENCOUNTER: Primary | ICD-10-CM

## 2018-11-05 PROCEDURE — 99213 OFFICE O/P EST LOW 20 MIN: CPT | Performed by: ORTHOPAEDIC SURGERY

## 2018-11-19 ENCOUNTER — OFFICE VISIT (OUTPATIENT)
Dept: ORTHOPEDIC SURGERY | Age: 48
End: 2018-11-19
Payer: COMMERCIAL

## 2018-11-19 VITALS
WEIGHT: 164.9 LBS | SYSTOLIC BLOOD PRESSURE: 135 MMHG | HEART RATE: 75 BPM | BODY MASS INDEX: 21.16 KG/M2 | DIASTOLIC BLOOD PRESSURE: 87 MMHG | HEIGHT: 74 IN

## 2018-11-19 DIAGNOSIS — S80.12XD CONTUSION OF LEFT KNEE AND LOWER LEG, SUBSEQUENT ENCOUNTER: Primary | ICD-10-CM

## 2018-11-19 DIAGNOSIS — S80.02XD CONTUSION OF LEFT KNEE AND LOWER LEG, SUBSEQUENT ENCOUNTER: Primary | ICD-10-CM

## 2018-11-19 PROCEDURE — 99213 OFFICE O/P EST LOW 20 MIN: CPT | Performed by: ORTHOPAEDIC SURGERY

## 2018-11-21 NOTE — PROGRESS NOTES
Subjective:      Patient ID: Zeus Pierson is a 50 y.o. male. HPI  Patient comes in today for evaluation of his left knee. He reports it is doing much better. He is noticed better range of motion better strength. Still some tightness in the Achilles. Current Outpatient Prescriptions   Medication Sig Dispense Refill    ibuprofen (ADVIL;MOTRIN) 600 MG tablet Take 600 mg by mouth      omeprazole (PRILOSEC) 20 MG delayed release capsule Take 1 capsule by mouth every morning (before breakfast) 90 capsule 3    ADVAIR DISKUS 100-50 MCG/DOSE diskus inhaler       montelukast (SINGULAIR) 10 MG tablet       Fexofenadine-Pseudoephedrine (ALLEGRA-D 24 HOUR PO) Take 1 tablet by mouth       No current facility-administered medications for this visit. Review of Systems   Cardiovascular: Negative for leg swelling. Musculoskeletal: Positive for arthralgias and joint swelling.      Past Medical History:   Diagnosis Date    Asthma     Discogenic syndrome, lumbar     Gastritis     GERD (gastroesophageal reflux disease) 1/14/2015    Hematuria     Stress    Hiatal hernia     Male pattern baldness     Mandibular retrognathism     Maxillary hyperplasia     Penile lesion     Prepatellar bursitis     Right Knee    Prostatic congestion     Reactive airway disease     Tension headache      Past Surgical History:   Procedure Laterality Date    ABDOMEN SURGERY      ANKLE FRACTURE SURGERY      APPENDECTOMY      COLONOSCOPY      INGUINAL HERNIA REPAIR Right 12/19/13    MANDIBLE SURGERY      NOSE SURGERY      UPPER GASTROINTESTINAL ENDOSCOPY      UPPER GASTROINTESTINAL ENDOSCOPY  06/08/2016    hiatal hernia; gastritis     Family History   Problem Relation Age of Onset    Asthma Other     Arthritis Other      Social History   Substance Use Topics    Smoking status: Never Smoker    Smokeless tobacco: Never Used    Alcohol use Yes      Comment: socially       Objective:     Vitals:    11/19/18 1491

## 2018-12-04 RX ORDER — OMEPRAZOLE 20 MG/1
20 CAPSULE, DELAYED RELEASE ORAL
Qty: 90 CAPSULE | Refills: 3 | Status: SHIPPED | OUTPATIENT
Start: 2018-12-04 | End: 2019-02-11

## 2019-02-04 ENCOUNTER — TELEPHONE (OUTPATIENT)
Dept: GASTROENTEROLOGY | Age: 49
End: 2019-02-04

## 2019-02-11 ENCOUNTER — OFFICE VISIT (OUTPATIENT)
Dept: GASTROENTEROLOGY | Age: 49
End: 2019-02-11
Payer: COMMERCIAL

## 2019-02-11 VITALS
WEIGHT: 170 LBS | HEART RATE: 71 BPM | BODY MASS INDEX: 21.82 KG/M2 | DIASTOLIC BLOOD PRESSURE: 79 MMHG | SYSTOLIC BLOOD PRESSURE: 122 MMHG

## 2019-02-11 DIAGNOSIS — K21.9 GASTROESOPHAGEAL REFLUX DISEASE WITHOUT ESOPHAGITIS: Primary | ICD-10-CM

## 2019-02-11 DIAGNOSIS — K29.60 NSAID INDUCED GASTRITIS: ICD-10-CM

## 2019-02-11 DIAGNOSIS — T39.395A NSAID INDUCED GASTRITIS: ICD-10-CM

## 2019-02-11 DIAGNOSIS — K20.0 EE (EOSINOPHILIC ESOPHAGITIS): ICD-10-CM

## 2019-02-11 PROCEDURE — G8420 CALC BMI NORM PARAMETERS: HCPCS | Performed by: INTERNAL MEDICINE

## 2019-02-11 PROCEDURE — 99214 OFFICE O/P EST MOD 30 MIN: CPT | Performed by: INTERNAL MEDICINE

## 2019-02-11 PROCEDURE — G8427 DOCREV CUR MEDS BY ELIG CLIN: HCPCS | Performed by: INTERNAL MEDICINE

## 2019-02-11 PROCEDURE — 1036F TOBACCO NON-USER: CPT | Performed by: INTERNAL MEDICINE

## 2019-02-11 PROCEDURE — G8484 FLU IMMUNIZE NO ADMIN: HCPCS | Performed by: INTERNAL MEDICINE

## 2019-02-11 RX ORDER — FAMOTIDINE 20 MG/1
20 TABLET, FILM COATED ORAL DAILY
COMMUNITY
End: 2019-02-28

## 2019-02-11 RX ORDER — SUCRALFATE ORAL 1 G/10ML
1 SUSPENSION ORAL 4 TIMES DAILY
COMMUNITY
End: 2019-02-11 | Stop reason: SDUPTHER

## 2019-02-11 RX ORDER — ESOMEPRAZOLE MAGNESIUM 20 MG/1
20 FOR SUSPENSION ORAL 2 TIMES DAILY
COMMUNITY
End: 2019-02-11 | Stop reason: SDUPTHER

## 2019-02-11 RX ORDER — SUCRALFATE ORAL 1 G/10ML
1 SUSPENSION ORAL 4 TIMES DAILY
Qty: 1200 ML | Refills: 0 | Status: SHIPPED | OUTPATIENT
Start: 2019-02-11 | End: 2019-08-29

## 2019-02-11 RX ORDER — ESOMEPRAZOLE MAGNESIUM 20 MG/1
20 FOR SUSPENSION ORAL 2 TIMES DAILY
Qty: 90 PACKET | Refills: 1 | Status: SHIPPED | OUTPATIENT
Start: 2019-02-11 | End: 2019-12-23

## 2019-02-11 ASSESSMENT — ENCOUNTER SYMPTOMS
RESPIRATORY NEGATIVE: 1
ABDOMINAL PAIN: 1
BLOOD IN STOOL: 0
ANAL BLEEDING: 0
CONSTIPATION: 1
VOMITING: 0
ABDOMINAL DISTENTION: 0
EYES NEGATIVE: 1
RECTAL PAIN: 0
DIARRHEA: 0
TROUBLE SWALLOWING: 0
ALLERGIC/IMMUNOLOGIC NEGATIVE: 1
NAUSEA: 0

## 2019-02-12 ENCOUNTER — ANESTHESIA EVENT (OUTPATIENT)
Dept: OPERATING ROOM | Age: 49
End: 2019-02-12
Payer: COMMERCIAL

## 2019-02-13 ENCOUNTER — ANESTHESIA (OUTPATIENT)
Dept: OPERATING ROOM | Age: 49
End: 2019-02-13
Payer: COMMERCIAL

## 2019-02-13 ENCOUNTER — HOSPITAL ENCOUNTER (OUTPATIENT)
Age: 49
Setting detail: OUTPATIENT SURGERY
Discharge: HOME | End: 2019-02-13
Attending: INTERNAL MEDICINE | Admitting: INTERNAL MEDICINE
Payer: COMMERCIAL

## 2019-02-13 VITALS
RESPIRATION RATE: 12 BRPM | WEIGHT: 171.74 LBS | OXYGEN SATURATION: 97 % | HEIGHT: 74 IN | DIASTOLIC BLOOD PRESSURE: 80 MMHG | HEART RATE: 69 BPM | BODY MASS INDEX: 22.04 KG/M2 | TEMPERATURE: 97 F | SYSTOLIC BLOOD PRESSURE: 120 MMHG

## 2019-02-13 VITALS
DIASTOLIC BLOOD PRESSURE: 63 MMHG | RESPIRATION RATE: 15 BRPM | SYSTOLIC BLOOD PRESSURE: 113 MMHG | OXYGEN SATURATION: 99 %

## 2019-02-13 LAB — COMMENT: NORMAL

## 2019-02-13 PROCEDURE — 88342 IMHCHEM/IMCYTCHM 1ST ANTB: CPT

## 2019-02-13 PROCEDURE — 6360000002 HC RX W HCPCS: Performed by: NURSE ANESTHETIST, CERTIFIED REGISTERED

## 2019-02-13 PROCEDURE — 3609012400 HC EGD TRANSORAL BIOPSY SINGLE/MULTIPLE: Performed by: INTERNAL MEDICINE

## 2019-02-13 PROCEDURE — 43239 EGD BIOPSY SINGLE/MULTIPLE: CPT | Performed by: INTERNAL MEDICINE

## 2019-02-13 PROCEDURE — 88305 TISSUE EXAM BY PATHOLOGIST: CPT

## 2019-02-13 PROCEDURE — 7100000010 HC PHASE II RECOVERY - FIRST 15 MIN: Performed by: INTERNAL MEDICINE

## 2019-02-13 PROCEDURE — 3700000000 HC ANESTHESIA ATTENDED CARE: Performed by: INTERNAL MEDICINE

## 2019-02-13 PROCEDURE — 2500000003 HC RX 250 WO HCPCS: Performed by: NURSE ANESTHETIST, CERTIFIED REGISTERED

## 2019-02-13 PROCEDURE — 2709999900 HC NON-CHARGEABLE SUPPLY: Performed by: INTERNAL MEDICINE

## 2019-02-13 PROCEDURE — 2580000003 HC RX 258: Performed by: ANESTHESIOLOGY

## 2019-02-13 PROCEDURE — 7100000011 HC PHASE II RECOVERY - ADDTL 15 MIN: Performed by: INTERNAL MEDICINE

## 2019-02-13 PROCEDURE — 3700000001 HC ADD 15 MINUTES (ANESTHESIA): Performed by: INTERNAL MEDICINE

## 2019-02-13 RX ORDER — FENTANYL CITRATE 50 UG/ML
25 INJECTION, SOLUTION INTRAMUSCULAR; INTRAVENOUS EVERY 5 MIN PRN
Status: DISCONTINUED | OUTPATIENT
Start: 2019-02-13 | End: 2019-02-13 | Stop reason: HOSPADM

## 2019-02-13 RX ORDER — ONDANSETRON 2 MG/ML
4 INJECTION INTRAMUSCULAR; INTRAVENOUS
Status: DISCONTINUED | OUTPATIENT
Start: 2019-02-13 | End: 2019-02-13 | Stop reason: HOSPADM

## 2019-02-13 RX ORDER — PROPOFOL 10 MG/ML
INJECTION, EMULSION INTRAVENOUS PRN
Status: DISCONTINUED | OUTPATIENT
Start: 2019-02-13 | End: 2019-02-13 | Stop reason: SDUPTHER

## 2019-02-13 RX ORDER — ALBUTEROL SULFATE 90 UG/1
2 AEROSOL, METERED RESPIRATORY (INHALATION) EVERY 6 HOURS PRN
COMMUNITY

## 2019-02-13 RX ORDER — LIDOCAINE HYDROCHLORIDE 20 MG/ML
INJECTION, SOLUTION EPIDURAL; INFILTRATION; INTRACAUDAL; PERINEURAL PRN
Status: DISCONTINUED | OUTPATIENT
Start: 2019-02-13 | End: 2019-02-13 | Stop reason: SDUPTHER

## 2019-02-13 RX ORDER — FENTANYL CITRATE 50 UG/ML
50 INJECTION, SOLUTION INTRAMUSCULAR; INTRAVENOUS EVERY 5 MIN PRN
Status: DISCONTINUED | OUTPATIENT
Start: 2019-02-13 | End: 2019-02-13 | Stop reason: HOSPADM

## 2019-02-13 RX ORDER — SODIUM CHLORIDE, SODIUM LACTATE, POTASSIUM CHLORIDE, CALCIUM CHLORIDE 600; 310; 30; 20 MG/100ML; MG/100ML; MG/100ML; MG/100ML
INJECTION, SOLUTION INTRAVENOUS CONTINUOUS
Status: DISCONTINUED | OUTPATIENT
Start: 2019-02-14 | End: 2019-02-13 | Stop reason: HOSPADM

## 2019-02-13 RX ORDER — SODIUM CHLORIDE 0.9 % (FLUSH) 0.9 %
10 SYRINGE (ML) INJECTION EVERY 12 HOURS SCHEDULED
Status: DISCONTINUED | OUTPATIENT
Start: 2019-02-13 | End: 2019-02-13 | Stop reason: HOSPADM

## 2019-02-13 RX ORDER — LIDOCAINE HYDROCHLORIDE 10 MG/ML
1 INJECTION, SOLUTION EPIDURAL; INFILTRATION; INTRACAUDAL; PERINEURAL
Status: DISCONTINUED | OUTPATIENT
Start: 2019-02-14 | End: 2019-02-13 | Stop reason: HOSPADM

## 2019-02-13 RX ORDER — SODIUM CHLORIDE 9 MG/ML
INJECTION, SOLUTION INTRAVENOUS CONTINUOUS
Status: DISCONTINUED | OUTPATIENT
Start: 2019-02-14 | End: 2019-02-13

## 2019-02-13 RX ORDER — SODIUM CHLORIDE 0.9 % (FLUSH) 0.9 %
10 SYRINGE (ML) INJECTION PRN
Status: DISCONTINUED | OUTPATIENT
Start: 2019-02-13 | End: 2019-02-13 | Stop reason: HOSPADM

## 2019-02-13 RX ADMIN — LIDOCAINE HYDROCHLORIDE 50 MG: 20 INJECTION, SOLUTION EPIDURAL; INFILTRATION; INTRACAUDAL; PERINEURAL at 09:03

## 2019-02-13 RX ADMIN — PROPOFOL 20 MG: 10 INJECTION, EMULSION INTRAVENOUS at 09:12

## 2019-02-13 RX ADMIN — PROPOFOL 50 MG: 10 INJECTION, EMULSION INTRAVENOUS at 09:07

## 2019-02-13 RX ADMIN — PROPOFOL 30 MG: 10 INJECTION, EMULSION INTRAVENOUS at 09:09

## 2019-02-13 RX ADMIN — PROPOFOL 40 MG: 10 INJECTION, EMULSION INTRAVENOUS at 09:04

## 2019-02-13 RX ADMIN — PROPOFOL 50 MG: 10 INJECTION, EMULSION INTRAVENOUS at 09:03

## 2019-02-13 RX ADMIN — PROPOFOL 40 MG: 10 INJECTION, EMULSION INTRAVENOUS at 09:11

## 2019-02-13 RX ADMIN — SODIUM CHLORIDE, POTASSIUM CHLORIDE, SODIUM LACTATE AND CALCIUM CHLORIDE: 600; 310; 30; 20 INJECTION, SOLUTION INTRAVENOUS at 07:36

## 2019-02-13 RX ADMIN — PROPOFOL 20 MG: 10 INJECTION, EMULSION INTRAVENOUS at 09:05

## 2019-02-13 ASSESSMENT — PULMONARY FUNCTION TESTS
PIF_VALUE: 1
PIF_VALUE: 0
PIF_VALUE: 0
PIF_VALUE: 1
PIF_VALUE: 1
PIF_VALUE: 0
PIF_VALUE: 1
PIF_VALUE: 0
PIF_VALUE: 1

## 2019-02-13 ASSESSMENT — PAIN DESCRIPTION - DESCRIPTORS: DESCRIPTORS: DISCOMFORT

## 2019-02-13 ASSESSMENT — PAIN - FUNCTIONAL ASSESSMENT: PAIN_FUNCTIONAL_ASSESSMENT: 0-10

## 2019-02-13 ASSESSMENT — PAIN SCALES - GENERAL: PAINLEVEL_OUTOF10: 0

## 2019-02-15 LAB — SURGICAL PATHOLOGY REPORT: NORMAL

## 2019-02-28 ENCOUNTER — OFFICE VISIT (OUTPATIENT)
Dept: GASTROENTEROLOGY | Age: 49
End: 2019-02-28
Payer: COMMERCIAL

## 2019-02-28 VITALS
HEART RATE: 72 BPM | SYSTOLIC BLOOD PRESSURE: 116 MMHG | BODY MASS INDEX: 21.58 KG/M2 | DIASTOLIC BLOOD PRESSURE: 77 MMHG | WEIGHT: 168.1 LBS

## 2019-02-28 DIAGNOSIS — K20.0 EE (EOSINOPHILIC ESOPHAGITIS): Primary | ICD-10-CM

## 2019-02-28 DIAGNOSIS — K21.9 GASTROESOPHAGEAL REFLUX DISEASE WITHOUT ESOPHAGITIS: ICD-10-CM

## 2019-02-28 PROCEDURE — G8484 FLU IMMUNIZE NO ADMIN: HCPCS | Performed by: INTERNAL MEDICINE

## 2019-02-28 PROCEDURE — 99214 OFFICE O/P EST MOD 30 MIN: CPT | Performed by: INTERNAL MEDICINE

## 2019-02-28 PROCEDURE — G8427 DOCREV CUR MEDS BY ELIG CLIN: HCPCS | Performed by: INTERNAL MEDICINE

## 2019-02-28 PROCEDURE — 1036F TOBACCO NON-USER: CPT | Performed by: INTERNAL MEDICINE

## 2019-02-28 PROCEDURE — G8420 CALC BMI NORM PARAMETERS: HCPCS | Performed by: INTERNAL MEDICINE

## 2019-02-28 ASSESSMENT — ENCOUNTER SYMPTOMS
RESPIRATORY NEGATIVE: 1
BLOOD IN STOOL: 0
ABDOMINAL DISTENTION: 0
ALLERGIC/IMMUNOLOGIC NEGATIVE: 1
NAUSEA: 0
RECTAL PAIN: 0
ABDOMINAL PAIN: 1
EYES NEGATIVE: 1
DIARRHEA: 0
ANAL BLEEDING: 0
VOMITING: 0
TROUBLE SWALLOWING: 0
CONSTIPATION: 1

## 2019-08-22 ENCOUNTER — TELEPHONE (OUTPATIENT)
Dept: GASTROENTEROLOGY | Age: 49
End: 2019-08-22

## 2019-08-23 ENCOUNTER — TELEPHONE (OUTPATIENT)
Dept: GASTROENTEROLOGY | Age: 49
End: 2019-08-23

## 2019-08-29 ENCOUNTER — OFFICE VISIT (OUTPATIENT)
Dept: GASTROENTEROLOGY | Age: 49
End: 2019-08-29
Payer: COMMERCIAL

## 2019-08-29 VITALS
HEART RATE: 77 BPM | BODY MASS INDEX: 21.38 KG/M2 | DIASTOLIC BLOOD PRESSURE: 89 MMHG | SYSTOLIC BLOOD PRESSURE: 128 MMHG | WEIGHT: 166.5 LBS

## 2019-08-29 DIAGNOSIS — K21.9 GASTROESOPHAGEAL REFLUX DISEASE WITHOUT ESOPHAGITIS: Primary | ICD-10-CM

## 2019-08-29 DIAGNOSIS — K20.0 EE (EOSINOPHILIC ESOPHAGITIS): ICD-10-CM

## 2019-08-29 PROCEDURE — 99214 OFFICE O/P EST MOD 30 MIN: CPT | Performed by: INTERNAL MEDICINE

## 2019-08-29 PROCEDURE — 1036F TOBACCO NON-USER: CPT | Performed by: INTERNAL MEDICINE

## 2019-08-29 PROCEDURE — G8420 CALC BMI NORM PARAMETERS: HCPCS | Performed by: INTERNAL MEDICINE

## 2019-08-29 PROCEDURE — G8427 DOCREV CUR MEDS BY ELIG CLIN: HCPCS | Performed by: INTERNAL MEDICINE

## 2019-08-29 ASSESSMENT — ENCOUNTER SYMPTOMS
VOMITING: 0
NAUSEA: 0
ABDOMINAL DISTENTION: 0
RECTAL PAIN: 0
BLOOD IN STOOL: 0
EYES NEGATIVE: 1
CONSTIPATION: 0
ALLERGIC/IMMUNOLOGIC NEGATIVE: 1
DIARRHEA: 0
ANAL BLEEDING: 0
RESPIRATORY NEGATIVE: 1
ABDOMINAL PAIN: 1
TROUBLE SWALLOWING: 0

## 2019-10-28 ENCOUNTER — OFFICE VISIT (OUTPATIENT)
Dept: FAMILY MEDICINE CLINIC | Age: 49
End: 2019-10-28
Payer: COMMERCIAL

## 2019-10-28 VITALS — SYSTOLIC BLOOD PRESSURE: 124 MMHG | DIASTOLIC BLOOD PRESSURE: 86 MMHG

## 2019-10-28 DIAGNOSIS — R07.89 CHEST TIGHTNESS: Primary | ICD-10-CM

## 2019-10-28 DIAGNOSIS — Z13.220 SCREENING, LIPID: ICD-10-CM

## 2019-10-28 DIAGNOSIS — Z13.1 DIABETES MELLITUS SCREENING: ICD-10-CM

## 2019-10-28 DIAGNOSIS — E55.9 VITAMIN D DEFICIENCY: ICD-10-CM

## 2019-10-28 DIAGNOSIS — M25.522 LEFT ELBOW PAIN: ICD-10-CM

## 2019-10-28 PROCEDURE — G8484 FLU IMMUNIZE NO ADMIN: HCPCS | Performed by: FAMILY MEDICINE

## 2019-10-28 PROCEDURE — 99213 OFFICE O/P EST LOW 20 MIN: CPT | Performed by: FAMILY MEDICINE

## 2019-10-28 PROCEDURE — G8427 DOCREV CUR MEDS BY ELIG CLIN: HCPCS | Performed by: FAMILY MEDICINE

## 2019-10-28 PROCEDURE — 1036F TOBACCO NON-USER: CPT | Performed by: FAMILY MEDICINE

## 2019-10-28 PROCEDURE — G8420 CALC BMI NORM PARAMETERS: HCPCS | Performed by: FAMILY MEDICINE

## 2019-10-28 ASSESSMENT — PATIENT HEALTH QUESTIONNAIRE - PHQ9
2. FEELING DOWN, DEPRESSED OR HOPELESS: 0
1. LITTLE INTEREST OR PLEASURE IN DOING THINGS: 0
SUM OF ALL RESPONSES TO PHQ QUESTIONS 1-9: 0
SUM OF ALL RESPONSES TO PHQ9 QUESTIONS 1 & 2: 0
SUM OF ALL RESPONSES TO PHQ QUESTIONS 1-9: 0

## 2019-10-28 ASSESSMENT — ENCOUNTER SYMPTOMS
ABDOMINAL PAIN: 0
CHEST TIGHTNESS: 1
BLOOD IN STOOL: 0
SHORTNESS OF BREATH: 0

## 2019-12-19 DIAGNOSIS — M25.522 LEFT ELBOW PAIN: Primary | ICD-10-CM

## 2020-02-03 ENCOUNTER — TELEPHONE (OUTPATIENT)
Dept: FAMILY MEDICINE CLINIC | Age: 50
End: 2020-02-03

## 2020-02-03 RX ORDER — DEXAMETHASONE SODIUM PHOSPHATE 4 MG/ML
INJECTION, SOLUTION INTRA-ARTICULAR; INTRALESIONAL; INTRAMUSCULAR; INTRAVENOUS; SOFT TISSUE
Qty: 30 ML | Refills: 0 | Status: SHIPPED | OUTPATIENT
Start: 2020-02-03 | End: 2020-07-31 | Stop reason: ALTCHOICE

## 2020-02-18 ENCOUNTER — HOSPITAL ENCOUNTER (OUTPATIENT)
Age: 50
Discharge: HOME | End: 2020-02-20
Payer: COMMERCIAL

## 2020-02-18 ENCOUNTER — HOSPITAL ENCOUNTER (OUTPATIENT)
Dept: GENERAL RADIOLOGY | Age: 50
Discharge: HOME | End: 2020-02-20
Payer: COMMERCIAL

## 2020-02-18 ENCOUNTER — OFFICE VISIT (OUTPATIENT)
Dept: FAMILY MEDICINE CLINIC | Age: 50
End: 2020-02-18
Payer: COMMERCIAL

## 2020-02-18 VITALS
TEMPERATURE: 98.5 F | DIASTOLIC BLOOD PRESSURE: 70 MMHG | RESPIRATION RATE: 16 BRPM | HEART RATE: 72 BPM | HEIGHT: 74 IN | BODY MASS INDEX: 21.94 KG/M2 | SYSTOLIC BLOOD PRESSURE: 108 MMHG | WEIGHT: 171 LBS

## 2020-02-18 PROCEDURE — 99213 OFFICE O/P EST LOW 20 MIN: CPT | Performed by: FAMILY MEDICINE

## 2020-02-18 PROCEDURE — G8427 DOCREV CUR MEDS BY ELIG CLIN: HCPCS | Performed by: FAMILY MEDICINE

## 2020-02-18 PROCEDURE — 1036F TOBACCO NON-USER: CPT | Performed by: FAMILY MEDICINE

## 2020-02-18 PROCEDURE — 71046 X-RAY EXAM CHEST 2 VIEWS: CPT

## 2020-02-18 PROCEDURE — G8420 CALC BMI NORM PARAMETERS: HCPCS | Performed by: FAMILY MEDICINE

## 2020-02-18 PROCEDURE — G8484 FLU IMMUNIZE NO ADMIN: HCPCS | Performed by: FAMILY MEDICINE

## 2020-02-18 RX ORDER — GUAIFENESIN AND CODEINE PHOSPHATE 100; 10 MG/5ML; MG/5ML
5 SOLUTION ORAL 4 TIMES DAILY PRN
Qty: 120 ML | Refills: 0 | Status: SHIPPED | OUTPATIENT
Start: 2020-02-18 | End: 2020-02-24

## 2020-02-18 SDOH — ECONOMIC STABILITY: FOOD INSECURITY: WITHIN THE PAST 12 MONTHS, THE FOOD YOU BOUGHT JUST DIDN'T LAST AND YOU DIDN'T HAVE MONEY TO GET MORE.: NEVER TRUE

## 2020-02-18 SDOH — ECONOMIC STABILITY: INCOME INSECURITY: HOW HARD IS IT FOR YOU TO PAY FOR THE VERY BASICS LIKE FOOD, HOUSING, MEDICAL CARE, AND HEATING?: NOT HARD AT ALL

## 2020-02-18 SDOH — ECONOMIC STABILITY: FOOD INSECURITY: WITHIN THE PAST 12 MONTHS, YOU WORRIED THAT YOUR FOOD WOULD RUN OUT BEFORE YOU GOT MONEY TO BUY MORE.: NEVER TRUE

## 2020-02-18 ASSESSMENT — ENCOUNTER SYMPTOMS
VOMITING: 0
DIARRHEA: 0
SHORTNESS OF BREATH: 0
WHEEZING: 0
COUGH: 1
ABDOMINAL PAIN: 0

## 2020-02-18 ASSESSMENT — PATIENT HEALTH QUESTIONNAIRE - PHQ9
2. FEELING DOWN, DEPRESSED OR HOPELESS: 0
1. LITTLE INTEREST OR PLEASURE IN DOING THINGS: 0
SUM OF ALL RESPONSES TO PHQ QUESTIONS 1-9: 0
SUM OF ALL RESPONSES TO PHQ QUESTIONS 1-9: 0
SUM OF ALL RESPONSES TO PHQ9 QUESTIONS 1 & 2: 0

## 2020-02-18 NOTE — PROGRESS NOTES
Subjective:      Patient ID: Arpit Colin is a 52 y.o. male. HPI  Visit Information    Have you changed or started any medications since your last visit including any over-the-counter medicines, vitamins, or herbal medicines? no   Have you stopped taking any of your medications? Is so, why? -  yes - pt just finished tamiflu last night  Are you having any side effects from any of your medications? - no    Have you seen any other physician or provider since your last visit?  no   Have you had any other diagnostic tests since your last visit?  no   Have you been seen in the emergency room and/or had an admission in a hospital since we last saw you?  no   Have you had your routine dental cleaning in the past 6 months?  no     Do you have an active MyChart account? If no, what is the barrier? Yes    Patient Care Team:  Blaine Monge MD as PCP - General (Family Medicine)  Blaine Monge MD as PCP - [facility]  Andria Dill MD as Consulting Physician (Gastroenterology)    Medical History Review  Past Medical, Family, and Social History reviewed and does not contribute to the patient presenting condition    Health Maintenance   Topic Date Due    Pneumococcal 0-64 years Vaccine (1 of 1 - PPSV23) 09/25/1976    HIV screen  09/25/1985    Flu vaccine (1) 09/01/2019    Lipid screen  08/28/2020    Shingles Vaccine (1 of 2) 09/25/2020    DTaP/Tdap/Td vaccine (2 - Td) 09/07/2025    Hepatitis A vaccine  Aged Out    Hepatitis B vaccine  Aged Out    Hib vaccine  Aged Out    Meningococcal (ACWY) vaccine  Aged Out           Patient is a 49-year-old white male who presents for asthma and for follow-up of urgent care visit 5 days ago when he states he was diagnosed with influenza A and prescribed Tamiflu which she has just completed. He states that he no longer has headaches, fever or generalized body aches but continues to feel very fatigued and is still coughing.   He states that the cough is productive of clear sputum but is not helped by OTC cough medication or by his albuterol inhaler. He also needs his previous lab orders reprinted since he did not get them done. He denies any chest pain, abdominal pain, vomiting or diarrhea. Review of Systems   Constitutional: Positive for fatigue. Negative for chills and fever. HENT: Positive for congestion. Respiratory: Positive for cough. Negative for shortness of breath and wheezing. Cardiovascular: Negative for chest pain. Gastrointestinal: Negative for abdominal pain, diarrhea and vomiting. Skin: Negative for rash. Objective:   Physical Exam  Vitals signs and nursing note reviewed. Constitutional:       General: He is not in acute distress. Appearance: He is well-developed. HENT:      Head: Normocephalic and atraumatic. Right Ear: Tympanic membrane, ear canal and external ear normal.      Left Ear: Tympanic membrane, ear canal and external ear normal.      Nose: Nose normal.      Mouth/Throat:      Mouth: Mucous membranes are moist.      Pharynx: Oropharynx is clear. Eyes:      General: No scleral icterus. Right eye: No discharge. Left eye: No discharge. Conjunctiva/sclera: Conjunctivae normal.   Neck:      Musculoskeletal: Neck supple. Cardiovascular:      Rate and Rhythm: Normal rate and regular rhythm. Heart sounds: Normal heart sounds. Pulmonary:      Effort: Pulmonary effort is normal. No respiratory distress. Breath sounds: No wheezing. Abdominal:      General: There is no distension. Palpations: Abdomen is soft. Tenderness: There is no abdominal tenderness. Skin:     General: Skin is warm and dry. Neurological:      Mental Status: He is alert and oriented to person, place, and time. Psychiatric:         Mood and Affect: Mood normal.         Behavior: Behavior normal.         Assessment:       Diagnosis Orders   1.  Asthma, unspecified asthma severity, unspecified whether complicated, unspecified whether persistent  XR CHEST STANDARD (2 VW)   2. Influenza A     3. Cough  XR CHEST STANDARD (2 VW)    guaiFENesin-codeine (TUSSI-ORGANIDIN NR) 100-10 MG/5ML syrup           Plan:      Orders Placed This Encounter   Procedures    XR CHEST STANDARD (2 VW)     Standing Status:   Future     Standing Expiration Date:   8/18/2020     Order Specific Question:   Reason for exam:     Answer:   Cough           Orders Placed This Encounter   Medications    guaiFENesin-codeine (TUSSI-ORGANIDIN NR) 100-10 MG/5ML syrup     Sig: Take 5 mLs by mouth 4 times daily as needed for Cough for up to 6 days.      Dispense:  120 mL     Refill:  0     Reduce doses taken as pain becomes manageable     Rest, fluids, Tylenol as needed  Previous lab orders reprinted and given to patient  Follow-up in 1 month if needed

## 2020-07-29 ENCOUNTER — APPOINTMENT (OUTPATIENT)
Dept: GENERAL RADIOLOGY | Facility: CLINIC | Age: 50
End: 2020-07-29
Payer: COMMERCIAL

## 2020-07-29 ENCOUNTER — HOSPITAL ENCOUNTER (EMERGENCY)
Facility: CLINIC | Age: 50
Discharge: HOME | End: 2020-07-30
Attending: SPECIALIST
Payer: COMMERCIAL

## 2020-07-29 VITALS
WEIGHT: 170 LBS | OXYGEN SATURATION: 96 % | SYSTOLIC BLOOD PRESSURE: 145 MMHG | BODY MASS INDEX: 21.83 KG/M2 | RESPIRATION RATE: 16 BRPM | DIASTOLIC BLOOD PRESSURE: 76 MMHG | HEART RATE: 74 BPM | TEMPERATURE: 98.4 F

## 2020-07-29 PROCEDURE — 71101 X-RAY EXAM UNILAT RIBS/CHEST: CPT

## 2020-07-29 PROCEDURE — 6370000000 HC RX 637 (ALT 250 FOR IP): Performed by: SPECIALIST

## 2020-07-29 PROCEDURE — 99283 EMERGENCY DEPT VISIT LOW MDM: CPT

## 2020-07-29 PROCEDURE — 72040 X-RAY EXAM NECK SPINE 2-3 VW: CPT

## 2020-07-29 RX ORDER — IBUPROFEN 800 MG/1
800 TABLET ORAL ONCE
Status: COMPLETED | OUTPATIENT
Start: 2020-07-30 | End: 2020-07-29

## 2020-07-29 RX ADMIN — IBUPROFEN 800 MG: 800 TABLET, FILM COATED ORAL at 23:59

## 2020-07-29 ASSESSMENT — PAIN DESCRIPTION - DESCRIPTORS: DESCRIPTORS: ACHING

## 2020-07-29 ASSESSMENT — PAIN DESCRIPTION - FREQUENCY: FREQUENCY: CONTINUOUS

## 2020-07-29 ASSESSMENT — PAIN DESCRIPTION - ONSET: ONSET: ON-GOING

## 2020-07-29 ASSESSMENT — PAIN DESCRIPTION - ORIENTATION: ORIENTATION: LEFT

## 2020-07-29 ASSESSMENT — PAIN SCALES - GENERAL: PAINLEVEL_OUTOF10: 6

## 2020-07-29 ASSESSMENT — PAIN DESCRIPTION - LOCATION: LOCATION: RIB CAGE

## 2020-07-29 ASSESSMENT — PAIN DESCRIPTION - PAIN TYPE: TYPE: ACUTE PAIN

## 2020-07-30 RX ORDER — IBUPROFEN 800 MG/1
800 TABLET ORAL EVERY 8 HOURS PRN
Qty: 20 TABLET | Refills: 0 | Status: SHIPPED | OUTPATIENT
Start: 2020-07-30 | End: 2021-05-12 | Stop reason: CLARIF
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
## 2020-07-31 ENCOUNTER — OFFICE VISIT (OUTPATIENT)
Dept: FAMILY MEDICINE CLINIC | Age: 50
End: 2020-07-31
Payer: COMMERCIAL

## 2020-07-31 VITALS
SYSTOLIC BLOOD PRESSURE: 116 MMHG | DIASTOLIC BLOOD PRESSURE: 80 MMHG | BODY MASS INDEX: 21.21 KG/M2 | HEART RATE: 60 BPM | RESPIRATION RATE: 20 BRPM | TEMPERATURE: 97.4 F | WEIGHT: 165.2 LBS

## 2020-07-31 PROCEDURE — 1036F TOBACCO NON-USER: CPT | Performed by: FAMILY MEDICINE

## 2020-07-31 PROCEDURE — G8427 DOCREV CUR MEDS BY ELIG CLIN: HCPCS | Performed by: FAMILY MEDICINE

## 2020-07-31 PROCEDURE — 99213 OFFICE O/P EST LOW 20 MIN: CPT | Performed by: FAMILY MEDICINE

## 2020-07-31 PROCEDURE — G8420 CALC BMI NORM PARAMETERS: HCPCS | Performed by: FAMILY MEDICINE

## 2020-07-31 RX ORDER — CYCLOBENZAPRINE HCL 10 MG
10 TABLET ORAL 3 TIMES DAILY PRN
Qty: 30 TABLET | Refills: 0 | Status: SHIPPED | OUTPATIENT
Start: 2020-07-31 | End: 2020-08-10

## 2020-07-31 ASSESSMENT — ENCOUNTER SYMPTOMS
ABDOMINAL PAIN: 0
BLOOD IN STOOL: 0
CHEST TIGHTNESS: 0
SHORTNESS OF BREATH: 0

## 2020-07-31 ASSESSMENT — PATIENT HEALTH QUESTIONNAIRE - PHQ9
1. LITTLE INTEREST OR PLEASURE IN DOING THINGS: 0
2. FEELING DOWN, DEPRESSED OR HOPELESS: 0
SUM OF ALL RESPONSES TO PHQ9 QUESTIONS 1 & 2: 0
SUM OF ALL RESPONSES TO PHQ QUESTIONS 1-9: 0
SUM OF ALL RESPONSES TO PHQ QUESTIONS 1-9: 0

## 2020-07-31 NOTE — PROGRESS NOTES
worsened over the ensuing days and he went to the ER where x-rays of his cervical spine and ribs were taken and were negative. He was discharged home from the ER on ibuprofen. He states that his chest wall pain today is better. He still has some neck muscle spasms. He denies any fever, chills, shortness of breath, abdominal pain. Review of Systems   Constitutional: Negative for chills and fever. Respiratory: Negative for chest tightness and shortness of breath. Cardiovascular: Positive for chest pain (  Left chest wall pain). Gastrointestinal: Negative for abdominal pain and blood in stool. Genitourinary: Negative for dysuria and hematuria. Musculoskeletal: Positive for neck pain. Skin: Negative for rash. Objective:   Physical Exam  Vitals signs and nursing note reviewed. Constitutional:       General: He is not in acute distress. Appearance: He is well-developed. HENT:      Head: Normocephalic and atraumatic. Right Ear: Tympanic membrane, ear canal and external ear normal.      Left Ear: Tympanic membrane, ear canal and external ear normal.      Nose: Nose normal.      Mouth/Throat:      Mouth: Mucous membranes are moist.      Pharynx: Oropharynx is clear. Eyes:      General: No scleral icterus. Right eye: No discharge. Left eye: No discharge. Conjunctiva/sclera: Conjunctivae normal.   Neck:      Musculoskeletal: Muscular tenderness (  Mild posterior neck tenderness and spasm) present. No neck rigidity. Cardiovascular:      Rate and Rhythm: Normal rate and regular rhythm. Heart sounds: Normal heart sounds. Pulmonary:      Effort: Pulmonary effort is normal. No respiratory distress. Breath sounds: Normal breath sounds. No wheezing. Chest:      Chest wall: Tenderness (  Mild left chest wall tenderness) present. Abdominal:      General: There is no distension. Palpations: Abdomen is soft. Tenderness:  There is no abdominal tenderness. Skin:     General: Skin is warm and dry. Findings: No rash. Neurological:      Mental Status: He is alert and oriented to person, place, and time. Psychiatric:         Mood and Affect: Mood normal.         Behavior: Behavior normal.         Assessment:       Diagnosis Orders   1. Contusion of ribs, left, subsequent encounter     2.  Neck muscle strain, subsequent encounter             Plan:        Orders Placed This Encounter   Medications    cyclobenzaprine (FLEXERIL) 10 MG tablet     Sig: Take 1 tablet by mouth 3 times daily as needed for Muscle spasms     Dispense:  30 tablet     Refill:  0      Continue ibuprofen as prescribed by ER physician  Follow-up in 1 month if needed     Yu James RN

## 2021-02-03 ENCOUNTER — OFFICE VISIT (OUTPATIENT)
Dept: GASTROENTEROLOGY | Age: 51
End: 2021-02-03
Payer: COMMERCIAL

## 2021-02-03 VITALS
TEMPERATURE: 97.4 F | SYSTOLIC BLOOD PRESSURE: 121 MMHG | BODY MASS INDEX: 21.47 KG/M2 | WEIGHT: 167.2 LBS | DIASTOLIC BLOOD PRESSURE: 78 MMHG

## 2021-02-03 DIAGNOSIS — K20.0 EE (EOSINOPHILIC ESOPHAGITIS): Primary | ICD-10-CM

## 2021-02-03 DIAGNOSIS — K44.9 HIATAL HERNIA: ICD-10-CM

## 2021-02-03 DIAGNOSIS — Z12.11 SCREENING FOR COLORECTAL CANCER: ICD-10-CM

## 2021-02-03 DIAGNOSIS — Z12.12 SCREENING FOR COLORECTAL CANCER: ICD-10-CM

## 2021-02-03 DIAGNOSIS — K21.9 GASTROESOPHAGEAL REFLUX DISEASE, UNSPECIFIED WHETHER ESOPHAGITIS PRESENT: ICD-10-CM

## 2021-02-03 PROCEDURE — 1036F TOBACCO NON-USER: CPT | Performed by: INTERNAL MEDICINE

## 2021-02-03 PROCEDURE — G8420 CALC BMI NORM PARAMETERS: HCPCS | Performed by: INTERNAL MEDICINE

## 2021-02-03 PROCEDURE — 3017F COLORECTAL CA SCREEN DOC REV: CPT | Performed by: INTERNAL MEDICINE

## 2021-02-03 PROCEDURE — G8427 DOCREV CUR MEDS BY ELIG CLIN: HCPCS | Performed by: INTERNAL MEDICINE

## 2021-02-03 PROCEDURE — 99213 OFFICE O/P EST LOW 20 MIN: CPT | Performed by: INTERNAL MEDICINE

## 2021-02-03 PROCEDURE — G8484 FLU IMMUNIZE NO ADMIN: HCPCS | Performed by: INTERNAL MEDICINE

## 2021-02-03 RX ORDER — POLYETHYLENE GLYCOL 3350 17 G/17G
POWDER, FOR SOLUTION ORAL
Qty: 238 G | Refills: 0 | Status: SHIPPED | OUTPATIENT
Start: 2021-02-03 | End: 2021-05-12 | Stop reason: ALTCHOICE

## 2021-02-03 RX ORDER — FAMOTIDINE 20 MG/1
20 TABLET, FILM COATED ORAL 2 TIMES DAILY
Qty: 180 TABLET | Refills: 1 | Status: SHIPPED | OUTPATIENT
Start: 2021-02-03 | End: 2021-09-01 | Stop reason: ALTCHOICE

## 2021-02-03 ASSESSMENT — ENCOUNTER SYMPTOMS
DIARRHEA: 0
ABDOMINAL PAIN: 0
TROUBLE SWALLOWING: 0
CONSTIPATION: 0
RESPIRATORY NEGATIVE: 1
ALLERGIC/IMMUNOLOGIC NEGATIVE: 1
ANAL BLEEDING: 0
RECTAL PAIN: 0
GASTROINTESTINAL NEGATIVE: 1
VOMITING: 0
NAUSEA: 0
BLOOD IN STOOL: 0
ABDOMINAL DISTENTION: 0
EYES NEGATIVE: 1

## 2021-02-03 NOTE — PROGRESS NOTES
GI OFFICE FOLLOW UP    Albert Campoverde is a 48 y.o. male evaluated via on 2/3/2021. Consent:  He and/or health care decision maker is aware that that he may receive a bill for this telephone service, depending on his insurance coverage, and has provided verbal consent to proceed: YES      INTERVAL HISTORY:   No referring provider defined for this encounter. Chief Complaint   Patient presents with    GI Problem     2019pt hx of EE, no issues, turned 48 yrs old, due for colon       1. EE (eosinophilic esophagitis)    2. Hiatal hernia    3. Gastroesophageal reflux disease, unspecified whether esophagitis present    4. Screening for colorectal cancer      Here for follow-up evaluation    Has history for above issues    Taking PPI    Wants to stop taking PPI because of the possible side effects    Denies any significant dysphagia nausea vomiting hematemesis    No rectal bleeding melanotic stools is due for screening colonoscopy      No family history for colon cancer  Denies smoking alcohol abuse illicit drug usage  He is an athlete    HISTORY OF PRESENT ILLNESS: Mr.Andrew Raven Gonzalez is a 48 y.o. male with a past history remarkable for , referred for evaluation of   Chief Complaint   Patient presents with    GI Problem     2019pt hx of EE, no issues, turned 48 yrs old, due for colon   . Past Medical,Family, and Social History reviewed and does contribute to the patient presenting condition. Patient's PMH/PSH,SH,PSYCH Hx, MEDs, ALLERGIES, and ROS were all reviewed and updated in the appropriate sections.     PAST MEDICAL HISTORY:  Past Medical History:   Diagnosis Date    Asthma     Discogenic syndrome, lumbar     EE (eosinophilic esophagitis) 11/83/9993    Gastritis     GERD (gastroesophageal reflux disease) 1/14/2015    Hematuria     Stress    Hiatal hernia     Male pattern baldness     Mandibular retrognathism     Maxillary hyperplasia     Penile lesion     Prepatellar bursitis     Right Knee    Prostatic congestion     Reactive airway disease     Tension headache        Past Surgical History:   Procedure Laterality Date    ABDOMEN SURGERY      ANKLE FRACTURE SURGERY      APPENDECTOMY      COLONOSCOPY      ELBOW SURGERY      INGUINAL HERNIA REPAIR Right 12/19/13    MANDIBLE SURGERY      NOSE SURGERY      UPPER GASTROINTESTINAL ENDOSCOPY      UPPER GASTROINTESTINAL ENDOSCOPY  06/08/2016    hiatal hernia; gastritis    UPPER GASTROINTESTINAL ENDOSCOPY  61/51/2523    eosinophilic esophagitis    UPPER GASTROINTESTINAL ENDOSCOPY  2/13/2019    EGD BIOPSY performed by Onur Gresham MD at [facility]:    Current Outpatient Medications:     famotidine (PEPCID) 20 MG tablet, Take 1 tablet by mouth 2 times daily, Disp: 180 tablet, Rfl: 1    esomeprazole (NEXIUM) 20 MG delayed release capsule, Take 1 capsule by mouth 2 times daily, Disp: 180 capsule, Rfl: 2    albuterol sulfate  (90 Base) MCG/ACT inhaler, Inhale 2 puffs into the lungs every 6 hours as needed for Wheezing or Shortness of Breath, Disp: , Rfl:     montelukast (SINGULAIR) 10 MG tablet, Take 10 mg by mouth , Disp: , Rfl:     polyethylene glycol (MIRALAX) 17 GM/SCOOP powder, Use as Directed per instructions provided by physician office. , Disp: 238 g, Rfl: 0    bisacodyl (DULCOLAX) 5 MG EC tablet, Use as directed per instructions provided by physician office, Disp: 4 tablet, Rfl: 0    ADVAIR DISKUS 100-50 MCG/DOSE diskus inhaler, 1 PUFF(S) EVERY MORNING, 1 PUFF(S) EVERY EVENING, Disp: , Rfl:     ibuprofen (ADVIL;MOTRIN) 800 MG tablet, Take 1 tablet by mouth every 8 hours as needed for Pain, Disp: 20 tablet, Rfl: 0    Fexofenadine-Pseudoephedrine (ALLEGRA-D 24 HOUR PO), Take 1 tablet by mouth, Disp: , Rfl:     ALLERGIES:   No Known Allergies    FAMILY HISTORY:       Problem Relation Age of Onset  Asthma Other     Arthritis Other          SOCIAL HISTORY:   Social History     Socioeconomic History    Marital status: Single     Spouse name: Not on file    Number of children: Not on file    Years of education: Not on file    Highest education level: Not on file   Occupational History    Not on file   Social Needs    Financial resource strain: Not hard at all    Food insecurity     Worry: Never true     Inability: Never true   [employer] needs     Medical: Not on file     Non-medical: Not on file   Tobacco Use    Smoking status: Never Smoker    Smokeless tobacco: Never Used   Substance and Sexual Activity    Alcohol use: Yes     Comment: socially    Drug use: No    Sexual activity: Never   Lifestyle    Physical activity     Days per week: Not on file     Minutes per session: Not on file    Stress: Not on file   Relationships    Social connections     Talks on phone: Not on file     Gets together: Not on file     Attends Baptism service: Not on file     Active member of club or organization: Not on file     Attends meetings of clubs or organizations: Not on file     Relationship status: Not on file    Intimate partner violence     Fear of current or ex partner: Not on file     Emotionally abused: Not on file     Physically abused: Not on file     Forced sexual activity: Not on file   Other Topics Concern    Not on file   Social History Narrative    Not on file         REVIEW OF SYSTEMS:         Review of Systems   Constitutional: Negative. HENT: Negative. Negative for trouble swallowing. Eyes: Negative. Respiratory: Negative. Cardiovascular: Negative. Gastrointestinal: Negative. Negative for abdominal distention, abdominal pain (off and on), anal bleeding, blood in stool, constipation, diarrhea, nausea, rectal pain and vomiting. Endocrine: Negative. Genitourinary: Negative. Musculoskeletal: Positive for gait problem.         Left leg issues currently   Skin: Negative. Allergic/Immunologic: Negative. Neurological: Positive for headaches (migraines). Hematological: Negative. Psychiatric/Behavioral: Negative. PHYSICAL EXAMINATION: Vital signs reviewed per the nursing documentation. /78   Temp 97.4 °F (36.3 °C)   Wt 167 lb 3.2 oz (75.8 kg)   BMI 21.47 kg/m²   Body mass index is 21.47 kg/m². Physical Exam  Nursing note reviewed. Constitutional:       Appearance: He is well-developed. Comments: Anxious    HENT:      Head: Normocephalic and atraumatic. Eyes:      Conjunctiva/sclera: Conjunctivae normal.      Pupils: Pupils are equal, round, and reactive to light. Neck:      Musculoskeletal: Normal range of motion and neck supple. Cardiovascular:      Rate and Rhythm: Normal rate and regular rhythm. Pulmonary:      Effort: Pulmonary effort is normal.      Breath sounds: Normal breath sounds. Abdominal:      General: Bowel sounds are normal.      Palpations: Abdomen is soft. Comments: NON TENDER, NON DISTENTED  LIVER SPLEEN AND HERNIAS ARE NOT  PALPABLE  BOWEL SOUNDS ARE POSITIVE      Genitourinary:     Rectum: Normal.   Musculoskeletal: Normal range of motion. Skin:     General: Skin is warm. Neurological:      Mental Status: He is alert and oriented to person, place, and time. Deep Tendon Reflexes: Reflexes are normal and symmetric.            LABORATORY DATA: Reviewed  Lab Results   Component Value Date    WBC 9.4 08/28/2015    HGB 14.9 08/28/2015    HCT 44.5 08/28/2015    MCV 90.5 08/28/2015     08/28/2015     (H) 08/28/2015    K 4.3 08/28/2015     08/28/2015    CO2 27 08/28/2015    BUN 15 08/28/2015    CREATININE 0.85 08/28/2015         Lab Results   Component Value Date    RBC 4.92 08/28/2015    HGB 14.9 08/28/2015    MCV 90.5 08/28/2015    MCH 30.2 08/28/2015    MCHC 33.4 08/28/2015    RDW 12.7 08/28/2015    MPV 8.3 08/28/2015    BASOPCT 1 08/28/2015    LYMPHSABS 2.70 08/28/2015    MONOSABS 0.50 08/28/2015    NEUTROABS 5.80 08/28/2015    EOSABS 0.30 08/28/2015    BASOSABS 0.10 08/28/2015         DIAGNOSTIC TESTING:     No results found. Assessment  1. EE (eosinophilic esophagitis)    2. Hiatal hernia    3. Gastroesophageal reflux disease, unspecified whether esophagitis present    4. Screening for colorectal cancer        Plan    We will plan for screening colonoscopy at his convenience    The Endoscopic procedure was explained to the patient in detail  The prep and NPO were explained  All the Risks, Benefits, and Alternatives were explained  Risk of Bleeding, Perforation and Cardio Respiratory risks were explained  his questions were answered  The procedure has been scheduled with the  in the office  Patient was asked to give us a call for any questions  The patient has verbalized understanding and agreement to this plan. Pt seems to have signs and symptoms consistent with GERD, acid indigestion and heartburns. He was discussed  in detail about some possible life style and dietary modifications. He was stressed about the maintenance  of appropriate weight and effect of obesity contributing to reflux symptoms. Routine exercise was streesed. Avoidance of Caffeine, nicotine and chocolate were explained. Pt was asked to avoid spices grease and fried food. Advices were also given about avoidance of any kind of fast foods, soda pops and high energy drinks. Pt was advised to place two small block under the head end of the bed which may help with night time reflux. Was advised not to eat any thin at least 2-3 hrs before going to bed and walk especially after dinner    Pt has verbalized understanding and agreement to this plan.     We will try famotidine on him see if that works and if it does I would asked him to stop taking the PPIs    Pt was asked to chew food well  Take time in eating  Sit up or prop up when eating  Don't talk when eating  Walk after finish eating  Use liquids

## 2021-03-05 PROBLEM — Z12.11 SCREENING FOR COLORECTAL CANCER: Status: RESOLVED | Noted: 2021-02-03 | Resolved: 2021-03-05

## 2021-03-05 PROBLEM — Z12.12 SCREENING FOR COLORECTAL CANCER: Status: RESOLVED | Noted: 2021-02-03 | Resolved: 2021-03-05

## 2021-03-17 ENCOUNTER — TELEPHONE (OUTPATIENT)
Dept: GASTROENTEROLOGY | Age: 51
End: 2021-03-17

## 2021-03-17 NOTE — TELEPHONE ENCOUNTER
Sonal Quintero called the office today stating he is scheduled for colonoscopy on 3/22/21 at [facility] @ 8am, wondering if he can have EGD added to that procedure - or if he needs to be seen in the office first.  If so, he will cancel the 3/22/21 procedure until after he is seen when he can schedule egd/colon together. Writer advised pt that only Dr Javi Irvin can make that determination and we will discuss with him this afternoon and notify patient of his decision. Pt voiced his understanding, writer thanked and call ended.

## 2021-03-17 NOTE — TELEPHONE ENCOUNTER
Writer spoke to Dr Jackson Martínez who agreed to add an EGD to the procedure on 3/22/21, pt notified and was very appreciative.

## 2021-03-18 ENCOUNTER — HOSPITAL ENCOUNTER (OUTPATIENT)
Dept: LAB | Age: 51
Setting detail: SPECIMEN
Discharge: HOME | End: 2021-03-18
Payer: COMMERCIAL

## 2021-03-18 DIAGNOSIS — Z01.818 PREOP TESTING: Primary | ICD-10-CM

## 2021-03-18 PROCEDURE — U0005 INFEC AGEN DETEC AMPLI PROBE: HCPCS

## 2021-03-18 PROCEDURE — U0003 INFECTIOUS AGENT DETECTION BY NUCLEIC ACID (DNA OR RNA); SEVERE ACUTE RESPIRATORY SYNDROME CORONAVIRUS 2 (SARS-COV-2) (CORONAVIRUS DISEASE [COVID-19]), AMPLIFIED PROBE TECHNIQUE, MAKING USE OF HIGH THROUGHPUT TECHNOLOGIES AS DESCRIBED BY CMS-2020-01-R: HCPCS

## 2021-03-19 LAB
SARS-COV-2: NORMAL
SARS-COV-2: NOT DETECTED
SOURCE: NORMAL

## 2021-03-22 ENCOUNTER — HOSPITAL ENCOUNTER (OUTPATIENT)
Age: 51
Setting detail: OUTPATIENT SURGERY
Discharge: HOME | End: 2021-03-22
Attending: INTERNAL MEDICINE | Admitting: INTERNAL MEDICINE
Payer: COMMERCIAL

## 2021-03-22 ENCOUNTER — ANESTHESIA EVENT (OUTPATIENT)
Dept: OPERATING ROOM | Age: 51
End: 2021-03-22
Payer: COMMERCIAL

## 2021-03-22 ENCOUNTER — ANESTHESIA (OUTPATIENT)
Dept: OPERATING ROOM | Age: 51
End: 2021-03-22
Payer: COMMERCIAL

## 2021-03-22 VITALS
OXYGEN SATURATION: 98 % | DIASTOLIC BLOOD PRESSURE: 54 MMHG | SYSTOLIC BLOOD PRESSURE: 110 MMHG | RESPIRATION RATE: 11 BRPM

## 2021-03-22 VITALS
SYSTOLIC BLOOD PRESSURE: 117 MMHG | HEIGHT: 74 IN | HEART RATE: 60 BPM | TEMPERATURE: 96.8 F | BODY MASS INDEX: 20.66 KG/M2 | WEIGHT: 161 LBS | DIASTOLIC BLOOD PRESSURE: 77 MMHG | OXYGEN SATURATION: 98 % | RESPIRATION RATE: 16 BRPM

## 2021-03-22 PROCEDURE — 88305 TISSUE EXAM BY PATHOLOGIST: CPT

## 2021-03-22 PROCEDURE — 3700000000 HC ANESTHESIA ATTENDED CARE: Performed by: INTERNAL MEDICINE

## 2021-03-22 PROCEDURE — 2709999900 HC NON-CHARGEABLE SUPPLY: Performed by: INTERNAL MEDICINE

## 2021-03-22 PROCEDURE — 7100000010 HC PHASE II RECOVERY - FIRST 15 MIN: Performed by: INTERNAL MEDICINE

## 2021-03-22 PROCEDURE — 2580000003 HC RX 258: Performed by: ANESTHESIOLOGY

## 2021-03-22 PROCEDURE — 3609012400 HC EGD TRANSORAL BIOPSY SINGLE/MULTIPLE: Performed by: INTERNAL MEDICINE

## 2021-03-22 PROCEDURE — 6360000002 HC RX W HCPCS: Performed by: NURSE ANESTHETIST, CERTIFIED REGISTERED

## 2021-03-22 PROCEDURE — 43239 EGD BIOPSY SINGLE/MULTIPLE: CPT | Performed by: INTERNAL MEDICINE

## 2021-03-22 PROCEDURE — 7100000011 HC PHASE II RECOVERY - ADDTL 15 MIN: Performed by: INTERNAL MEDICINE

## 2021-03-22 PROCEDURE — 3609010600 HC COLONOSCOPY POLYPECTOMY SNARE/COLD BIOPSY: Performed by: INTERNAL MEDICINE

## 2021-03-22 PROCEDURE — 45380 COLONOSCOPY AND BIOPSY: CPT | Performed by: INTERNAL MEDICINE

## 2021-03-22 PROCEDURE — 3700000001 HC ADD 15 MINUTES (ANESTHESIA): Performed by: INTERNAL MEDICINE

## 2021-03-22 RX ORDER — MULTIVITAMIN WITH IRON
100 TABLET ORAL DAILY
COMMUNITY

## 2021-03-22 RX ORDER — ONDANSETRON 2 MG/ML
4 INJECTION INTRAMUSCULAR; INTRAVENOUS
Status: DISCONTINUED | OUTPATIENT
Start: 2021-03-22 | End: 2021-03-22 | Stop reason: HOSPADM

## 2021-03-22 RX ORDER — PROMETHAZINE HYDROCHLORIDE 25 MG/ML
6.25 INJECTION, SOLUTION INTRAMUSCULAR; INTRAVENOUS
Status: DISCONTINUED | OUTPATIENT
Start: 2021-03-22 | End: 2021-03-22 | Stop reason: HOSPADM

## 2021-03-22 RX ORDER — FENTANYL CITRATE 50 UG/ML
25 INJECTION, SOLUTION INTRAMUSCULAR; INTRAVENOUS EVERY 5 MIN PRN
Status: DISCONTINUED | OUTPATIENT
Start: 2021-03-22 | End: 2021-03-22 | Stop reason: HOSPADM

## 2021-03-22 RX ORDER — HYDRALAZINE HYDROCHLORIDE 20 MG/ML
5 INJECTION INTRAMUSCULAR; INTRAVENOUS EVERY 10 MIN PRN
Status: DISCONTINUED | OUTPATIENT
Start: 2021-03-22 | End: 2021-03-22 | Stop reason: HOSPADM

## 2021-03-22 RX ORDER — LABETALOL HYDROCHLORIDE 5 MG/ML
5 INJECTION, SOLUTION INTRAVENOUS EVERY 10 MIN PRN
Status: DISCONTINUED | OUTPATIENT
Start: 2021-03-22 | End: 2021-03-22 | Stop reason: HOSPADM

## 2021-03-22 RX ORDER — HYDROMORPHONE HYDROCHLORIDE 1 MG/ML
0.5 INJECTION, SOLUTION INTRAMUSCULAR; INTRAVENOUS; SUBCUTANEOUS EVERY 5 MIN PRN
Status: DISCONTINUED | OUTPATIENT
Start: 2021-03-22 | End: 2021-03-22 | Stop reason: HOSPADM

## 2021-03-22 RX ORDER — FENTANYL CITRATE 50 UG/ML
INJECTION, SOLUTION INTRAMUSCULAR; INTRAVENOUS PRN
Status: DISCONTINUED | OUTPATIENT
Start: 2021-03-22 | End: 2021-03-22 | Stop reason: SDUPTHER

## 2021-03-22 RX ORDER — OXYCODONE HYDROCHLORIDE AND ACETAMINOPHEN 5; 325 MG/1; MG/1
2 TABLET ORAL PRN
Status: DISCONTINUED | OUTPATIENT
Start: 2021-03-22 | End: 2021-03-22 | Stop reason: HOSPADM

## 2021-03-22 RX ORDER — OXYCODONE HYDROCHLORIDE AND ACETAMINOPHEN 5; 325 MG/1; MG/1
1 TABLET ORAL PRN
Status: DISCONTINUED | OUTPATIENT
Start: 2021-03-22 | End: 2021-03-22 | Stop reason: HOSPADM

## 2021-03-22 RX ORDER — SODIUM CHLORIDE, SODIUM LACTATE, POTASSIUM CHLORIDE, CALCIUM CHLORIDE 600; 310; 30; 20 MG/100ML; MG/100ML; MG/100ML; MG/100ML
INJECTION, SOLUTION INTRAVENOUS CONTINUOUS
Status: DISCONTINUED | OUTPATIENT
Start: 2021-03-23 | End: 2021-03-22 | Stop reason: HOSPADM

## 2021-03-22 RX ORDER — MULTIVIT WITH MINERALS/LUTEIN
250 TABLET ORAL DAILY
COMMUNITY

## 2021-03-22 RX ORDER — IBUPROFEN 200 MG
200 TABLET ORAL EVERY 6 HOURS PRN
COMMUNITY

## 2021-03-22 RX ORDER — CALCIUM CARBONATE 500(1250)
500 TABLET ORAL DAILY
COMMUNITY

## 2021-03-22 RX ORDER — PROPOFOL 10 MG/ML
INJECTION, EMULSION INTRAVENOUS PRN
Status: DISCONTINUED | OUTPATIENT
Start: 2021-03-22 | End: 2021-03-22 | Stop reason: SDUPTHER

## 2021-03-22 RX ORDER — LIDOCAINE HYDROCHLORIDE 10 MG/ML
1 INJECTION, SOLUTION EPIDURAL; INFILTRATION; INTRACAUDAL; PERINEURAL
Status: DISCONTINUED | OUTPATIENT
Start: 2021-03-23 | End: 2021-03-22 | Stop reason: HOSPADM

## 2021-03-22 RX ADMIN — PROPOFOL 50 MG: 10 INJECTION, EMULSION INTRAVENOUS at 10:18

## 2021-03-22 RX ADMIN — PROPOFOL 50 MG: 10 INJECTION, EMULSION INTRAVENOUS at 10:29

## 2021-03-22 RX ADMIN — Medication 75 MCG: at 10:17

## 2021-03-22 RX ADMIN — SODIUM CHLORIDE, POTASSIUM CHLORIDE, SODIUM LACTATE AND CALCIUM CHLORIDE: 600; 310; 30; 20 INJECTION, SOLUTION INTRAVENOUS at 09:54

## 2021-03-22 RX ADMIN — PROPOFOL 50 MG: 10 INJECTION, EMULSION INTRAVENOUS at 10:21

## 2021-03-22 RX ADMIN — PROPOFOL 50 MG: 10 INJECTION, EMULSION INTRAVENOUS at 10:25

## 2021-03-22 RX ADMIN — PROPOFOL 50 MG: 10 INJECTION, EMULSION INTRAVENOUS at 10:36

## 2021-03-22 RX ADMIN — Medication 25 MCG: at 10:28

## 2021-03-22 RX ADMIN — PROPOFOL 50 MG: 10 INJECTION, EMULSION INTRAVENOUS at 10:17

## 2021-03-22 ASSESSMENT — PULMONARY FUNCTION TESTS
PIF_VALUE: 0

## 2021-03-22 ASSESSMENT — PAIN SCALES - GENERAL: PAINLEVEL_OUTOF10: 0

## 2021-03-22 NOTE — OP NOTE
PROCEDURE NOTE    DATE OF PROCEDURE: 3/22/2021    SURGEON: Tom Márquez MD    ASSISTANT: None    PREOPERATIVE DIAGNOSIS: SCREENING    POSTOPERATIVE DIAGNOSIS: as described below    OPERATION: Total colonoscopy     ANESTHESIA: MAC PER ANESTHESIA     ESTIMATED BLOOD LOSS: less than 50     COMPLICATIONS: None. SPECIMENS:  Was Obtained:     HISTORY: The patient is a 48y.o. year old male with history of above preop diagnosis. I recommended colonoscopy with possible biopsy or polypectomy and I explained the risk, benefits, expected outcome, and alternatives to the procedure. Risks included but are not limited to bleeding, infection, respiratory distress, hypotension, and perforation of the colon and possibility of missing a lesion. The patient understands and is in agreement. PROCEDURE: The patient was given IV conscious sedation. The patient's SPO2 remained above 90% throughout the procedure. The colonoscope was inserted per rectum and advanced under direct vision to the cecum without difficulty. The prep was good. Findings:  Terminal ileum: normal    Cecum/Ascending colon: normal    Transverse colon: normal    Descending/Sigmoid colon: normal    Rectum/Anus: examined in normal and retroflexed positions and was abnormal: AT THE RECTO SIGMOID JUNCITON A SMALL 3 MM POLYP WAS EXCISED WITH JUMBO BIOPSY FORCEPS    SMALL INT HEMORRHOIDS    Withdrawal Time was (minutes): 12    The colon was decompressed and the scope was removed. The patient tolerated the procedure well. Recommendations/Plan:   1. Lifestyle and dietary modifications as discussed  2. F/U Biopsies  3. F/U In Office in 3-4 weeks  4. Discussed with the family  5. Colonoscopy Recall :5 year  6. POST SEDATION PATIENT WAS STABLE WITH STABLE VITAL SIGNS AND OXYGEN SATURATIONS AND WAS DISCHARGED HOME WITH RIDE IN A STABLE CONDITION.     Electronically signed by Tom Márquez MD  on 3/22/2021 at 10:44 AM

## 2021-03-22 NOTE — H&P
History and Physical Service   [facility] [place] - [facility]PATIENT    HISTORY AND PHYSICAL EXAMINATION            Date of Evaluation: 3/22/2021  Patient name:  Iván Gonzalez  MRN:   [id number]  YOB: 1970  PCP:    Angie Gonzáles MD    History Obtained From:     Patient, Medical record    History of Present Illness: This is Iván Gonzalez a 48 y.o. male who presents today for a colorectal cancer screening, not high risk and EGD by Dr. Raulito Lopez for screening, eosinophilic esophagitis. The patient completed the bowel prep as directed and now has watery clear stool. No family history of colon cancer. Previous EGD was in 02/2019 and last colonoscopy was 10 years ago. Patient's chief complaint is intermittent, mild to severe, epigastric pain for the past month. Pt denies black tarry stools, diarrhea, constipation, nausea, vomiting, dysphagia, fever, chills, night sweats, bloating, and unexplained weight loss. History of GERD, gastritis, and hiatal hernia. Pt denies history of ulcers, IBS, and diabetes. Advil was last taken on 03/17/2021.      Past Medical History:     Past Medical History:   Diagnosis Date    Asthma     Discogenic syndrome, lumbar     EE (eosinophilic esophagitis) 69/76/1615    Gastritis     GERD (gastroesophageal reflux disease) 1/14/2015    Hematuria     Stress    Hiatal hernia     Male pattern baldness     Mandibular retrognathism     Maxillary hyperplasia     Penile lesion     Prepatellar bursitis     Right Knee    Prostatic congestion     Reactive airway disease     Tension headache         Past Surgical History:     Past Surgical History:   Procedure Laterality Date    ABDOMEN SURGERY      ANKLE FRACTURE SURGERY      APPENDECTOMY      COLONOSCOPY      ELBOW SURGERY      INGUINAL HERNIA REPAIR Right 12/19/13    MANDIBLE SURGERY      NOSE SURGERY      UPPER GASTROINTESTINAL ENDOSCOPY      UPPER GASTROINTESTINAL ENDOSCOPY  06/08/2016 hiatal hernia; gastritis    UPPER GASTROINTESTINAL ENDOSCOPY  84/31/4638    eosinophilic esophagitis    UPPER GASTROINTESTINAL ENDOSCOPY  2/13/2019    EGD BIOPSY performed by Liam Hussein MD at [address] [place] [address]        Medications Prior to Admission:     Prior to Admission medications    Medication Sig Start Date End Date Taking? Authorizing Provider   famotidine (PEPCID) 20 MG tablet Take 1 tablet by mouth 2 times daily 2/3/21   Liam Hussein MD   esomeprazole (NEXIUM) 20 MG delayed release capsule Take 1 capsule by mouth 2 times daily 2/3/21   Liam Hussein MD   polyethylene glycol [facility]) 17 GM/SCOOP powder Use as Directed per instructions provided by physician office. 2/3/21   Liam Hussein MD   bisacodyl (DULCOLAX) 5 MG EC tablet Use as directed per instructions provided by physician office 2/3/21   Liam Hussein MD   ADVAIR DISKUS 100-50 MCG/DOSE diskus inhaler 1 PUFF(S) EVERY MORNING, 1 PUFF(S) EVERY EVENING 5/28/20   Historical Provider, MD   ibuprofen (ADVIL;MOTRIN) 800 MG tablet Take 1 tablet by mouth every 8 hours as needed for Pain 7/30/20 8/6/20  Paige Kirk MD   albuterol sulfate  (90 Base) MCG/ACT inhaler Inhale 2 puffs into the lungs every 6 hours as needed for Wheezing or Shortness of Breath    Historical Provider, MD   montelukast (SINGULAIR) 10 MG tablet Take 10 mg by mouth  3/11/17   Historical Provider, MD   Fexofenadine-Pseudoephedrine (ALLEGRA-D 24 HOUR PO) Take 1 tablet by mouth    Historical Provider, MD        Allergies:     Patient has no known allergies. Social History:     Tobacco:    reports that he has never smoked. He has never used smokeless tobacco.  Alcohol:      reports current alcohol use. Drug Use:  reports no history of drug use. Family History:     Family History   Problem Relation Age of Onset    Asthma Other     Arthritis Other        Review of Systems:     Positive and Negative as described in HPI.     CONSTITUTIONAL: Negative for fevers, chills, sweats, fatigue, and weight loss. HEENT: Pt wears reading glasses. Negative for hearing changes, rhinorrhea, and throat pain  RESPIRATORY: History of asthma. Negative for shortness of breath, cough, congestion, and wheezing. CARDIOVASCULAR: Negative for chest pain, blood clot, irregular heart beat, and palpitations. GASTROINTESTINAL: See HPI. GENITOURINARY: Negative for difficulty of urination, burning with urination,and frequency. INTEGUMENT: Negative for rash, skin lesions, and easy bruising. HEMATOLOGIC/LYMPHATIC: Negative for swelling/edema. ALLERGIC/IMMUNOLOGIC: Itchy legs. Negative for urticaria. ENDOCRINE: Negative for increase in drinking and heat or cold intolerance. MUSCULOSKELETAL: Intermittent right ankle pain. NEUROLOGICAL: Negative for headaches, dizziness, lightheadedness, numbness, and tingling extremities. Pt denies history of seizures and strokes. BEHAVIOR/PSYCH: Negative for depression and anxiety. Physical Exam:   /87   Pulse 87   Temp 96.6 °F (35.9 °C) (Temporal)   Resp 14   SpO2 97%  No results for input(s): POCGLU in the last 72 hours. General Appearance:  Alert, well appearing, and in no acute distress. Mental status: Oriented to person, place, and time. Head: Normocephalic and atraumatic. Eye: No icterus, redness, pupils equal and reactive, extraocular eye movements intact, and conjunctiva clear. Ear: Hearing grossly intact. Nose: No drainage noted. Mouth: Mucous membranes moist.  Neck: Supple and no carotid bruits noted. Lungs: Bilateral equal air entry, clear to auscultation, no wheezing, rales or rhonchi, and normal effort. Cardiovascular: Normal rate, regular rhythm, no murmur, gallop, and rub. Abdomen: Soft, nontender, nondistended, and active bowel sounds. Neurologic: Normal speech and cranial nerves II through XII grossly intact. Skin: No gross lesions, rashes, bruising, or bleeding on exposed skin area.   Extremities: Posterior tibial pulses 2+ bilaterally. No pedal edema. No calf tenderness with palpation. Psych: Normal affect. Investigations:      Laboratory Testing:  No results found for this or any previous visit (from the past 24 hour(s)). No results for input(s): HGB, HCT, WBC, MCV, PLATELET, NA, K, CL, CO2, BUN, CREATININE, GLUCOSE, INR, PROTIME, APTT, AST, ALT, LABALBU, HCG in the last 720 hours. Recent Labs     03/18/21  1240   COVID19       Not Detected       Diagnosis:      1. Screening, eosinophilic esophagitis    Plans:     1. Colorectal cancer screening, not high risk   2.  EGD       STONE Green CNP  3/22/2021  9:10 AM

## 2021-03-22 NOTE — ANESTHESIA PRE PROCEDURE
PUFF(S) EVERY EVENING 5/28/20   Historical Provider, MD   ibuprofen (ADVIL;MOTRIN) 800 MG tablet Take 1 tablet by mouth every 8 hours as needed for Pain 7/30/20 8/6/20  Daniel Pack MD       Current medications:    Current Facility-Administered Medications   Medication Dose Route Frequency Provider Last Rate Last Admin    [START ON 3/23/2021] lactated ringers infusion   Intravenous Continuous Nael Espitia MD       Polly Cushing Ceasar [place]ropshire ON 3/23/2021] lidocaine PF 1 % injection 1 mL  1 mL Intradermal Once PRN Nael Espitia MD           Allergies:  No Known Allergies    Problem List:    Patient Active Problem List   Diagnosis Code    Asthma J45.909    Discogenic syndrome, lumbar M51.26    Prepatellar bursitis M70.40    Tension headache G44.209    Reactive airway disease J37.80    Male pattern baldness L64.9    Hematuria     Prostatic congestion N42.1    Maxillary hyperplasia M26.01    Mandibular retrognathism M26.19    Right inguinal hernia K40.90    GERD (gastroesophageal reflux disease) K21.9    Vitamin D deficiency E55.9    Gastritis K29.70    Hiatal hernia K44.9    EE (eosinophilic esophagitis) X35.2       Past Medical History:        Diagnosis Date    Asthma     Discogenic syndrome, lumbar     EE (eosinophilic esophagitis) 84/78/1349    Gastritis     GERD (gastroesophageal reflux disease) 1/14/2015    Hematuria     Stress    Hiatal hernia     Male pattern baldness     Mandibular retrognathism     Maxillary hyperplasia     Penile lesion     Prepatellar bursitis     Right Knee    Prostatic congestion     Reactive airway disease     Tension headache        Past Surgical History:        Procedure Laterality Date    ABDOMEN SURGERY      ANKLE FRACTURE SURGERY      APPENDECTOMY      COLONOSCOPY      ELBOW SURGERY      ENDOSCOPY, COLON, DIAGNOSTIC      INGUINAL HERNIA REPAIR Right 12/19/13    MANDIBLE SURGERY      NOSE SURGERY      UPPER GASTROINTESTINAL ENDOSCOPY      UPPER GASTROINTESTINAL ENDOSCOPY  06/08/2016    hiatal hernia; gastritis    UPPER GASTROINTESTINAL ENDOSCOPY  77/97/6750    eosinophilic esophagitis    UPPER GASTROINTESTINAL ENDOSCOPY  2/13/2019    EGD BIOPSY performed by Merlyn Luna MD at [place] [address] History:    Social History     Tobacco Use    Smoking status: Never Smoker    Smokeless tobacco: Never Used   Substance Use Topics    Alcohol use: Yes     Comment: socially                                Counseling given: Not Answered      Vital Signs (Current):   Vitals:    03/22/21 0909 03/22/21 0920   BP: 125/87    Pulse: 87    Resp: 14    Temp: 96.6 °F (35.9 °C)    TempSrc: Temporal    SpO2: 97%    Weight:  161 lb (73 kg)   Height:  6' 2\" (1.88 m)                                              BP Readings from Last 3 Encounters:   03/22/21 125/87   02/03/21 121/78   07/31/20 116/80       NPO Status: Time of last liquid consumption: 2100                        Time of last solid consumption: 1800                        Date of last liquid consumption: 03/21/21                        Date of last solid food consumption: 03/20/21    BMI:   Wt Readings from Last 3 Encounters:   03/22/21 161 lb (73 kg)   02/03/21 167 lb 3.2 oz (75.8 kg)   07/31/20 165 lb 3.2 oz (74.9 kg)     Body mass index is 20.67 kg/m². CBC:   Lab Results   Component Value Date    WBC 9.4 08/28/2015    RBC 4.92 08/28/2015    HGB 14.9 08/28/2015    HCT 44.5 08/28/2015    MCV 90.5 08/28/2015    RDW 12.7 08/28/2015     08/28/2015       CMP:   Lab Results   Component Value Date     08/28/2015    K 4.3 08/28/2015     08/28/2015    CO2 27 08/28/2015    BUN 15 08/28/2015    CREATININE 0.85 08/28/2015    GFRAA >60 08/28/2015    LABGLOM >60 08/28/2015    GLUCOSE 95 08/28/2015    CALCIUM 9.1 08/28/2015       POC Tests: No results for input(s): POCGLU, POCNA, POCK, POCCL, POCBUN, POCHEMO, POCHCT in the last 72 hours.     Coags: No results found for: PROTIME, INR, APTT    HCG (If Applicable): No results found for: PREGTESTUR, PREGSERUM, HCG, HCGQUANT     ABGs: No results found for: PHART, PO2ART, XCK1BTU, MIG9NOL, BEART, Q4IZISFD     Type & Screen (If Applicable):  No results found for: LABABO, LABRH    Drug/Infectious Status (If Applicable):  No results found for: HIV, HEPCAB    COVID-19 Screening (If Applicable):   Lab Results   Component Value Date    COVID19 Not Detected 03/18/2021           Anesthesia Evaluation    Airway: Mallampati: I  TM distance: >3 FB   Neck ROM: full  Mouth opening: > = 3 FB Dental:          Pulmonary:normal exam    (+) asthma: allergic asthma,                            Cardiovascular:Negative CV ROS  Exercise tolerance: good (>4 METS),                     Neuro/Psych:      (-) headaches           GI/Hepatic/Renal:   (+) hiatal hernia, GERD:,           Endo/Other: Negative Endo/Other ROS                    Abdominal:           Vascular:                                        Anesthesia Plan      MAC     ASA 2             Anesthetic plan and risks discussed with patient. Plan discussed with CRNA.     Attending anesthesiologist reviewed and agrees with Pre Eval content              Germán Valdez MD   3/22/2021

## 2021-03-22 NOTE — OP NOTE
PROCEDURE NOTE    DATE OF PROCEDURE: 3/22/2021     SURGEON: Johnny Acevedo MD    ASSISTANT: None    PREOPERATIVE DIAGNOSIS: GERD    POSTOPERATIVE DIAGNOSIS: As described below    OPERATION: Upper GI endoscopy with Biopsy    ANESTHESIA: MAC PER ANESTHESIA     ESTIMATED BLOOD LOSS: Less than 50 ml    COMPLICATIONS: None. SPECIMENS:  Was Obtained:     HISTORY: The patient is a 48y.o. year old male with history of above preop diagnosis. I recommended esophagogastroduodenoscopy with possible biopsy and I explained the risk, benefits, expected outcome, and alternatives to the procedure. Risks included but are not limited to bleeding, infection, respiratory distress, hypotension, and perforation of the esophagus, stomach, or duodenum. Patient understands and is in agreement. PROCEDURE: The patient was given IV conscious sedation. The patient's SPO2 remained above 90% throughout the procedure. The gastroscope was inserted orally and advanced under direct vision through the esophagus, through the stomach, through the pylorus, and into the descending duodenum. Findings:    Retropharyngeal area was grossly normal appearing    Esophagus: abnormal: MILD IRREGULAR SCJ BIOPSIES AND PICTURES WERE TAKEN    Stomach:    Fundus: abnormal: FEW SMALL POLYPS    Body: abnormal: FEW SMALL POLYPS BIOPSIES WERE TAKEN    Antrum: abnormal: MILD GASTRITIS WAS BIOPSIED FOR H PYLORI    Duodenum:     Descending: normal    Bulb: normal    The scope was removed and the patient tolerated the procedure well. Recommendations/Plan:   1. F/U Biopsies  2. F/U In Office in 3-4 weeks  3. Discussed with the family  4.  Post sedation patient was stable with stable vital signs and stable O2 saturations    Electronically signed by Johnny Acevedo MD  on 3/22/2021 at 10:24 AM

## 2021-03-23 LAB — SURGICAL PATHOLOGY REPORT: NORMAL

## 2021-03-29 NOTE — ED PROVIDER NOTES
She has been scheduled.    needed for Muscle spasms    ESOMEPRAZOLE (NEXIUM) 40 MG DELAYED RELEASE CAPSULE    Take 1 capsule by mouth daily       ALLERGIES     has No Known Allergies. FAMILY HISTORY     indicated that his mother is alive. He indicated that his father is alive. He indicated that the status of his other is unknown.    family history includes Arthritis in an other family member; Asthma in an other family member. SOCIAL HISTORY      reports that he has never smoked. He has never used smokeless tobacco. He reports that he drinks alcohol. He reports that he does not use illicit drugs. PHYSICAL EXAM     INITIAL VITALS:  height is 6' 2\" (1.88 m) and weight is 72.6 kg (160 lb). His oral temperature is 97.8 °F (36.6 °C). His blood pressure is 145/112 (abnormal) and his pulse is 122. His respiration is 16 and oxygen saturation is 97%. DIFFERENTIAL DIAGNOSIS/ MDM:         DIAGNOSTIC RESULTS     EKG: All EKG's are interpreted by the Emergency Department Physician who either signs or Co-signs this chart in the absence of a cardiologist.        Not indicated unless otherwise documented above    LABS:  No results found for this visit on 05/15/17. Not indicated unless otherwise documented above    RADIOLOGY:   I reviewed the radiologist interpretations:  No orders to display       Not indicated unless otherwise documented above    EMERGENCY DEPARTMENT COURSE:     The patient was given the following medications:  Orders Placed This Encounter   Medications    DISCONTD: ketorolac (TORADOL) injection 60 mg    methylPREDNISolone acetate (DEPO-MEDROL) injection 80 mg    ketorolac (TORADOL) injection 60 mg    HYDROcodone-acetaminophen (NORCO) 5-325 MG per tablet     Sig: Take 1 tablet by mouth every 6 hours as needed for Pain .      Dispense:  10 tablet     Refill:  0    predniSONE (DELTASONE) 20 MG tablet     Sig: Take 1 tablet by mouth 2 times daily for 5 days     Dispense:  10 tablet     Refill:  0        Vitals:    Vitals: with a voice recognition program.  Efforts were made to edit the dictations but occasionally words are mis-transcribed.)    Calvillo MD  Attending Emergency Physician             Janay Hutchins III, MD  05/15/17 [id number]

## 2021-04-20 ENCOUNTER — TELEPHONE (OUTPATIENT)
Dept: GASTROENTEROLOGY | Age: 51
End: 2021-04-20

## 2021-04-20 NOTE — TELEPHONE ENCOUNTER
Pt called requesting for a script for Nexium to be sent to [facility] for 90 days. Pts insurance will no longer cover 30 days.       pts next appt is 5/12/21

## 2021-05-10 ENCOUNTER — HOSPITAL ENCOUNTER (OUTPATIENT)
Facility: CLINIC | Age: 51
Discharge: HOME | End: 2021-05-10
Payer: COMMERCIAL

## 2021-05-10 ENCOUNTER — TELEPHONE (OUTPATIENT)
Dept: GASTROENTEROLOGY | Age: 51
End: 2021-05-10

## 2021-05-10 DIAGNOSIS — Z12.12 SCREENING FOR COLORECTAL CANCER: ICD-10-CM

## 2021-05-10 DIAGNOSIS — K20.0 EE (EOSINOPHILIC ESOPHAGITIS): ICD-10-CM

## 2021-05-10 DIAGNOSIS — K21.9 GASTROESOPHAGEAL REFLUX DISEASE, UNSPECIFIED WHETHER ESOPHAGITIS PRESENT: ICD-10-CM

## 2021-05-10 DIAGNOSIS — Z12.11 SCREENING FOR COLORECTAL CANCER: ICD-10-CM

## 2021-05-10 DIAGNOSIS — K44.9 HIATAL HERNIA: ICD-10-CM

## 2021-05-10 LAB
BUN BLDV-MCNC: 11 MG/DL (ref 6–20)
CREAT SERPL-MCNC: 0.88 MG/DL (ref 0.7–1.2)
GFR AFRICAN AMERICAN: >60 ML/MIN
GFR NON-AFRICAN AMERICAN: >60 ML/MIN
GFR SERPL CREATININE-BSD FRML MDRD: NORMAL ML/MIN/{1.73_M2}
GFR SERPL CREATININE-BSD FRML MDRD: NORMAL ML/MIN/{1.73_M2}

## 2021-05-10 PROCEDURE — 84520 ASSAY OF UREA NITROGEN: CPT

## 2021-05-10 PROCEDURE — 82565 ASSAY OF CREATININE: CPT

## 2021-05-10 PROCEDURE — 36415 COLL VENOUS BLD VENIPUNCTURE: CPT

## 2021-05-12 ENCOUNTER — OFFICE VISIT (OUTPATIENT)
Dept: GASTROENTEROLOGY | Age: 51
End: 2021-05-12
Payer: COMMERCIAL

## 2021-05-12 VITALS
WEIGHT: 167 LBS | BODY MASS INDEX: 21.43 KG/M2 | DIASTOLIC BLOOD PRESSURE: 75 MMHG | HEIGHT: 74 IN | HEART RATE: 75 BPM | SYSTOLIC BLOOD PRESSURE: 107 MMHG

## 2021-05-12 DIAGNOSIS — K21.00 GASTROESOPHAGEAL REFLUX DISEASE WITH ESOPHAGITIS WITHOUT HEMORRHAGE: ICD-10-CM

## 2021-05-12 DIAGNOSIS — K20.0 EE (EOSINOPHILIC ESOPHAGITIS): Primary | ICD-10-CM

## 2021-05-12 DIAGNOSIS — K62.1 POLYP OF RECTUM: ICD-10-CM

## 2021-05-12 PROCEDURE — 1036F TOBACCO NON-USER: CPT | Performed by: INTERNAL MEDICINE

## 2021-05-12 PROCEDURE — G8427 DOCREV CUR MEDS BY ELIG CLIN: HCPCS | Performed by: INTERNAL MEDICINE

## 2021-05-12 PROCEDURE — 3017F COLORECTAL CA SCREEN DOC REV: CPT | Performed by: INTERNAL MEDICINE

## 2021-05-12 PROCEDURE — 99213 OFFICE O/P EST LOW 20 MIN: CPT | Performed by: INTERNAL MEDICINE

## 2021-05-12 PROCEDURE — G8420 CALC BMI NORM PARAMETERS: HCPCS | Performed by: INTERNAL MEDICINE

## 2021-05-12 RX ORDER — ASCORBATE CALCIUM 500 MG
TABLET ORAL
COMMUNITY

## 2021-05-12 ASSESSMENT — ENCOUNTER SYMPTOMS
GASTROINTESTINAL NEGATIVE: 1
ANAL BLEEDING: 0
EYES NEGATIVE: 1
ABDOMINAL PAIN: 0
RECTAL PAIN: 0
VOMITING: 0
NAUSEA: 0
CONSTIPATION: 0
ABDOMINAL DISTENTION: 0
RESPIRATORY NEGATIVE: 1
DIARRHEA: 0
TROUBLE SWALLOWING: 0
BLOOD IN STOOL: 0

## 2021-05-12 NOTE — PROGRESS NOTES
GI OFFICE FOLLOW UP    Mt Ortiz is a 48 y.o. male evaluated via on 5/12/2021. Consent:  He and/or health care decision maker is aware that that he may receive a bill for this telephone service, depending on his insurance coverage, and has provided verbal consent to proceed: YES      INTERVAL HISTORY:   No referring provider defined for this encounter. Chief Complaint   Patient presents with    Follow-up     Patient is a procedure f/u. Patient notes intermittent stomach pain. He notes he will notice this depending on what he eats, he notes after the 12 hr nexium he notices is when he has to take another. Notes that now he has to pay attention a little more to the types of foods he eats. Denies nausea or vomiting, just upset stomach. 1. EE (eosinophilic esophagitis)    2. Gastroesophageal reflux disease with esophagitis without hemorrhage    3. Polyp of rectum         The patient is here as a follow up of his recent GI procedure. The results have been sent to you separately   The findings were explained to the patient in detail and biopsies were also discussed   with him    Had recent EGD and colonoscopy by me  Eosinophilic esophagitis was noted on the esophageal biopsies  Small benign polyp was biopsied in the stomach  Mild gastritis  Small hyperplastic polyp in the rectum    Patient taking Nexium 20 mg twice daily  Overall feeling okay  No significant dysphagia    Mild anxiety issues    No bleeding no melena    No smoking alcohol abuse illicit drug usage  No known family history for colon cancer    Also sees allergist and gets shots for allergy  HISTORY OF PRESENT ILLNESS: Mr.Andrew Makenna Cia is a 48 y.o. male with a past history remarkable for , referred for evaluation of   Chief Complaint   Patient presents with    Follow-up     Patient is a procedure f/u.  Patient notes 100 units TABS, Take by mouth, Disp: , Rfl:     magnesium citrate solution, Take 296 mLs by mouth once, Disp: , Rfl:     esomeprazole (NEXIUM) 20 MG delayed release capsule, Take 1 capsule by mouth 2 times daily, Disp: 180 capsule, Rfl: 3    calcium carbonate (OSCAL) 500 MG TABS tablet, Take 500 mg by mouth daily, Disp: , Rfl:     Cholecalciferol (VITAMIN D3) 125 MCG (5000 UT) TABS, Take by mouth, Disp: , Rfl:     vitamin B-6 (PYRIDOXINE) 100 MG tablet, Take 100 mg by mouth daily, Disp: , Rfl:     Ascorbic Acid (VITAMIN C) 250 MG tablet, Take 250 mg by mouth daily, Disp: , Rfl:     ADVAIR DISKUS 100-50 MCG/DOSE diskus inhaler, 1 PUFF(S) EVERY MORNING, 1 PUFF(S) EVERY EVENING, Disp: , Rfl:     albuterol sulfate  (90 Base) MCG/ACT inhaler, Inhale 2 puffs into the lungs every 6 hours as needed for Wheezing or Shortness of Breath, Disp: , Rfl:     montelukast (SINGULAIR) 10 MG tablet, Take 10 mg by mouth , Disp: , Rfl:     Fexofenadine-Pseudoephedrine (ALLEGRA-D 24 HOUR PO), Take 1 tablet by mouth, Disp: , Rfl:     ibuprofen (ADVIL;MOTRIN) 200 MG tablet, Take 200 mg by mouth every 6 hours as needed for Pain, Disp: , Rfl:     famotidine (PEPCID) 20 MG tablet, Take 1 tablet by mouth 2 times daily (Patient not taking: Reported on 5/12/2021), Disp: 180 tablet, Rfl: 1    ALLERGIES:   No Known Allergies    FAMILY HISTORY:       Problem Relation Age of Onset    Asthma Other     Arthritis Other          SOCIAL HISTORY:   Social History     Socioeconomic History    Marital status: Single     Spouse name: Not on file    Number of children: Not on file    Years of education: Not on file    Highest education level: Not on file   Occupational History    Not on file   Social Needs    Financial resource strain: Not hard at all   Missy-Osmin insecurity     Worry: Never true     Inability: Never true   [employer] needs     Medical: Not on file     Non-medical: Not on file   Tobacco Use    Smoking status: Comments: Anxious    HENT:      Head: Normocephalic and atraumatic. Eyes:      Conjunctiva/sclera: Conjunctivae normal.      Pupils: Pupils are equal, round, and reactive to light. Neck:      Musculoskeletal: Normal range of motion and neck supple. Cardiovascular:      Rate and Rhythm: Normal rate and regular rhythm. Pulmonary:      Effort: Pulmonary effort is normal.      Breath sounds: Normal breath sounds. Abdominal:      General: Bowel sounds are normal.      Palpations: Abdomen is soft. Comments: NON TENDER, NON DISTENTED  LIVER SPLEEN AND HERNIAS ARE NOT  PALPABLE  BOWEL SOUNDS ARE POSITIVE      Genitourinary:     Rectum: Normal.   Musculoskeletal: Normal range of motion. Skin:     General: Skin is warm. Neurological:      Mental Status: He is alert and oriented to person, place, and time. Deep Tendon Reflexes: Reflexes are normal and symmetric. LABORATORY DATA: Reviewed  Lab Results   Component Value Date    WBC 9.4 08/28/2015    HGB 14.9 08/28/2015    HCT 44.5 08/28/2015    MCV 90.5 08/28/2015     08/28/2015     (H) 08/28/2015    K 4.3 08/28/2015     08/28/2015    CO2 27 08/28/2015    BUN 11 05/10/2021    CREATININE 0.88 05/10/2021         Lab Results   Component Value Date    RBC 4.92 08/28/2015    HGB 14.9 08/28/2015    MCV 90.5 08/28/2015    MCH 30.2 08/28/2015    MCHC 33.4 08/28/2015    RDW 12.7 08/28/2015    MPV 8.3 08/28/2015    BASOPCT 1 08/28/2015    LYMPHSABS 2.70 08/28/2015    MONOSABS 0.50 08/28/2015    NEUTROABS 5.80 08/28/2015    EOSABS 0.30 08/28/2015    BASOSABS 0.10 08/28/2015         DIAGNOSTIC TESTING:     No results found. Assessment  1. EE (eosinophilic esophagitis)    2. Gastroesophageal reflux disease with esophagitis without hemorrhage    3. Polyp of rectum        Plan    Continue PPI therapy    Pt was discussed in detail about the possible side effects of proton pump inhibiter therapy.     He was explained about the possibility of calcium and magnesium malabsorption and was advised to start taking calcium supplements with Vit D. Some over the counter regimens were explained to patient. Some dietary advices were also given. He has verbalized understanding and agreement to this. Pt seems to have signs and symptoms consistent with GERD, acid indigestion and heartburns. He was discussed  in detail about some possible life style and dietary modifications. He was stressed about the maintenance  of appropriate weight and effect of obesity contributing to reflux symptoms. Routine exercise was streesed. Avoidance of Caffeine, nicotine and chocolate were explained. Pt was asked to avoid spices grease and fried food. Advices were also given about avoidance of any kind of fast foods, soda pops and high energy drinks. Pt was advised to place two small block under the head end of the bed which may help with night time reflux. Was advised not to eat any thin at least 2-3 hrs before going to bed and walk especially after dinner    Pt has verbalized understanding and agreement to this plan. Pt was advised in detail about some life style and dietary modifications. He was advised about avoidance of caffeine, nicotine and chocolate. Pt was also told to stay away from any kind of fast foods, soda pops. He was also advised to avoid lots of spices, grease and fried food etc.     Instructions were also given about trying to arrange the timing, quality and quantity of food. Instructions were given about using ample amount of fiber including dietary and supplemental fiber either metamucil, bennafiber or citrucell etc.  Pt was advised about drinking ample amount of water without any colors or chemicals. Stress was given about regular exercise. Pt has verbalized understanding and agreement to these modifications.       The patient was instructed to start taking some OTC Probiotics products   These are available over the counter at the Pharmacy stores and Grocery stores  He was explained about the beneficial effects they have in the GI track  They will help to establish the good bacterial gloria and will help with the digestion and bowel movements  The patient has verbalized understanding and agreement to this plan    More than half of patient's clinic visit time was spent in counseling about lifestyle and dietary modifications  Patient's  questions were answered in this regard as well  The patient has verbalized understanding and agreement     See back in 1 year  call me for any issues  I communicated with the patient and/or health care decision maker about   Details of this discussion including any medical advice provided:YES      I affirm this is a Patient Initiated Episode with an Established Patient who has not had a related appointment within my department in the past 7 days or scheduled within the next 24 hours. Total Time: minutes: 21-30 minutes    Note: not billable if this call serves to triage the patient into an appointment for the relevant concern      Thank you for allowing me to participate in the care of Mr. Mae Myles. For any further questions please do not hesitate to contact me. I have reviewed and agree with the ROS entered by the MA/LPN.          Gagan Thompson MD, [facility]  Board Certified in Gastroenterology and [address] [place] [address] Gastroenterology  Office #: [phone]

## 2021-08-29 ENCOUNTER — APPOINTMENT (OUTPATIENT)
Dept: GENERAL RADIOLOGY | Facility: CLINIC | Age: 51
End: 2021-08-29
Payer: COMMERCIAL

## 2021-08-29 ENCOUNTER — HOSPITAL ENCOUNTER (EMERGENCY)
Facility: CLINIC | Age: 51
Discharge: HOME | End: 2021-08-29
Attending: EMERGENCY MEDICINE
Payer: COMMERCIAL

## 2021-08-29 VITALS
WEIGHT: 165 LBS | HEART RATE: 93 BPM | RESPIRATION RATE: 16 BRPM | HEIGHT: 74 IN | OXYGEN SATURATION: 97 % | SYSTOLIC BLOOD PRESSURE: 123 MMHG | DIASTOLIC BLOOD PRESSURE: 73 MMHG | BODY MASS INDEX: 21.17 KG/M2 | TEMPERATURE: 99.9 F

## 2021-08-29 DIAGNOSIS — Z20.822 SUSPECTED COVID-19 VIRUS INFECTION: Primary | ICD-10-CM

## 2021-08-29 LAB
ABSOLUTE EOS #: 0 K/UL (ref 0–0.4)
ABSOLUTE IMMATURE GRANULOCYTE: ABNORMAL K/UL (ref 0–0.3)
ABSOLUTE LYMPH #: 0.77 K/UL (ref 1–4.8)
ABSOLUTE MONO #: 0.28 K/UL (ref 0.1–0.8)
ANION GAP SERPL CALCULATED.3IONS-SCNC: 12 MMOL/L (ref 9–17)
BASOPHILS # BLD: 0 % (ref 0–2)
BASOPHILS ABSOLUTE: 0 K/UL (ref 0–0.2)
BUN BLDV-MCNC: 9 MG/DL (ref 6–20)
BUN/CREAT BLD: NORMAL (ref 9–20)
CALCIUM SERPL-MCNC: 9.2 MG/DL (ref 8.6–10.4)
CHLORIDE BLD-SCNC: 106 MMOL/L (ref 98–107)
CO2: 21 MMOL/L (ref 20–31)
CREAT SERPL-MCNC: 1 MG/DL (ref 0.7–1.2)
DIFFERENTIAL TYPE: ABNORMAL
EOSINOPHILS RELATIVE PERCENT: 0 % (ref 1–4)
GFR AFRICAN AMERICAN: >60 ML/MIN
GFR NON-AFRICAN AMERICAN: >60 ML/MIN
GFR SERPL CREATININE-BSD FRML MDRD: NORMAL ML/MIN/{1.73_M2}
GFR SERPL CREATININE-BSD FRML MDRD: NORMAL ML/MIN/{1.73_M2}
GLUCOSE BLD-MCNC: 88 MG/DL (ref 70–99)
HCT VFR BLD CALC: 44 % (ref 41–53)
HEMOGLOBIN: 15 G/DL (ref 13.5–17.5)
IMMATURE GRANULOCYTES: ABNORMAL %
LYMPHOCYTES # BLD: 14 % (ref 24–44)
MCH RBC QN AUTO: 30.8 PG (ref 26–34)
MCHC RBC AUTO-ENTMCNC: 34 G/DL (ref 31–37)
MCV RBC AUTO: 90.5 FL (ref 80–100)
MONOCYTES # BLD: 5 % (ref 1–7)
MORPHOLOGY: NORMAL
NRBC AUTOMATED: ABNORMAL PER 100 WBC
PDW BLD-RTO: 12.7 % (ref 12.5–15.4)
PLATELET # BLD: 181 K/UL (ref 140–450)
PLATELET ESTIMATE: ABNORMAL
PMV BLD AUTO: 7.9 FL (ref 6–12)
POTASSIUM SERPL-SCNC: 4.2 MMOL/L (ref 3.7–5.3)
RBC # BLD: 4.87 M/UL (ref 4.5–5.9)
RBC # BLD: ABNORMAL 10*6/UL
SEG NEUTROPHILS: 81 % (ref 36–66)
SEGMENTED NEUTROPHILS ABSOLUTE COUNT: 4.45 K/UL (ref 1.8–7.7)
SODIUM BLD-SCNC: 139 MMOL/L (ref 135–144)
WBC # BLD: 5.5 K/UL (ref 3.5–11)
WBC # BLD: ABNORMAL 10*3/UL

## 2021-08-29 PROCEDURE — 71045 X-RAY EXAM CHEST 1 VIEW: CPT

## 2021-08-29 PROCEDURE — 80048 BASIC METABOLIC PNL TOTAL CA: CPT

## 2021-08-29 PROCEDURE — 2580000003 HC RX 258: Performed by: EMERGENCY MEDICINE

## 2021-08-29 PROCEDURE — 85025 COMPLETE CBC W/AUTO DIFF WBC: CPT

## 2021-08-29 PROCEDURE — 36415 COLL VENOUS BLD VENIPUNCTURE: CPT

## 2021-08-29 PROCEDURE — 96374 THER/PROPH/DIAG INJ IV PUSH: CPT

## 2021-08-29 PROCEDURE — 6360000002 HC RX W HCPCS: Performed by: EMERGENCY MEDICINE

## 2021-08-29 PROCEDURE — 99284 EMERGENCY DEPT VISIT MOD MDM: CPT

## 2021-08-29 RX ORDER — ONDANSETRON 4 MG/1
4 TABLET, ORALLY DISINTEGRATING ORAL 3 TIMES DAILY PRN
Qty: 21 TABLET | Refills: 0 | Status: SHIPPED | OUTPATIENT
Start: 2021-08-29

## 2021-08-29 RX ORDER — 0.9 % SODIUM CHLORIDE 0.9 %
1000 INTRAVENOUS SOLUTION INTRAVENOUS ONCE
Status: COMPLETED | OUTPATIENT
Start: 2021-08-29 | End: 2021-08-29

## 2021-08-29 RX ORDER — KETOROLAC TROMETHAMINE 30 MG/ML
30 INJECTION, SOLUTION INTRAMUSCULAR; INTRAVENOUS ONCE
Status: COMPLETED | OUTPATIENT
Start: 2021-08-29 | End: 2021-08-29

## 2021-08-29 RX ADMIN — SODIUM CHLORIDE 1000 ML: 9 INJECTION, SOLUTION INTRAVENOUS at 18:15

## 2021-08-29 RX ADMIN — KETOROLAC TROMETHAMINE 30 MG: 30 INJECTION, SOLUTION INTRAMUSCULAR at 18:16

## 2021-08-29 ASSESSMENT — ENCOUNTER SYMPTOMS
NAUSEA: 1
COUGH: 1
TROUBLE SWALLOWING: 0
SHORTNESS OF BREATH: 1
DIARRHEA: 0
SINUS PAIN: 0
VOMITING: 0
SORE THROAT: 1
BACK PAIN: 1
ABDOMINAL PAIN: 0
CHEST TIGHTNESS: 0
SINUS PRESSURE: 0
CONSTIPATION: 0

## 2021-08-29 ASSESSMENT — PAIN SCALES - GENERAL: PAINLEVEL_OUTOF10: 8

## 2021-08-29 NOTE — LETTER
[facility] ED  [address] [place] [id number]  Phone: [phone]               August 29, 2021    Patient: Leonora Hugo   YOB: 1970   Date of Visit: 8/29/2021       To Whom It May Concern: Maxime Mcneill was seen and treated in our emergency department on 8/29/2021. He may return to work on 8/31/21.       Sincerely,       DUYEN Hawkins RN BSN         Signature:__________________________________

## 2021-08-29 NOTE — ED PROVIDER NOTES
Chief Complaint:  Ms. Nogueira is here today for Toe Pain (greater toe left foot)    ALLERGIES:   Allergen Reactions   • Amoxicillin DIARRHEA      Medication Reviewed 2019.    Social History     Occupational History   • Occupation: [employer]      Comment: [employer]   Tobacco Use   • Smoking status: Former Smoker     Last attempt to quit: 2001     Years since quittin.8   • Smokeless tobacco: Never Used   Substance and Sexual Activity   • Alcohol use: No     Alcohol/week: 0.0 standard drinks     Comment: recovering alcoholic 26yrs   • Drug use: No   • Sexual activity: Not on file        Patient's current [facility] status: Active.  Patient would like communication of their results via:      Cell Phone:   Telephone Information:   Mobile [phone]     Okay to leave a message containing results? Yes             [facility]  [facility]  Phone: [phone]        Pt Name: Janeth Rivera  MRN: [id number]  [place] 1970  Date of evaluation: 8/29/21      CHIEF COMPLAINT     Chief Complaint   Patient presents with    URI     pt wants a covid test     Shortness of Breath         HISTORY OF PRESENT ILLNESS  (Location/Symptom, Timing/Onset, Context/Setting, Quality, Duration, Modifying Factors, Severity.)    Janeth Rivera is a 48 y.o. male who presents shortness of breath and generalized body aches. He has been sick for the last 2 days. Possibly was exposed to somebody with Covid at work. He presented to get a Covid test done. Explained we do not do Covid tests here in the emergency department but we gave the referral so that he can get an outpatient Covid test done. Denies loss of smell or taste. He has had chills and body aches unknown if he had a fever. He has not been eating much over the last 2 days. He does not have much of an appetite. He also is complaining of a headache. He did take some Tylenol earlier with no relief. Has a nonproductive cough. Denies any chest pain. REVIEW OF SYSTEMS    (2-9 systems for level 4, 10 or more for level 5)     Review of Systems   Constitutional: Positive for activity change, appetite change, chills and fatigue. Negative for fever. HENT: Positive for congestion and sore throat. Negative for sinus pressure, sinus pain and trouble swallowing. Respiratory: Positive for cough and shortness of breath. Negative for chest tightness. Cardiovascular: Negative for chest pain, palpitations and leg swelling. Gastrointestinal: Positive for nausea. Negative for abdominal pain, constipation, diarrhea and vomiting. Genitourinary: Negative for difficulty urinating and dysuria. Musculoskeletal: Positive for back pain and myalgias. Negative for gait problem, joint swelling, neck pain and neck stiffness.    Skin: Negative for rash and wound. Neurological: Positive for dizziness, light-headedness and headaches. Negative for tremors, seizures, syncope, speech difficulty, weakness and numbness. PAST MEDICAL HISTORY    has a past medical history of Asthma, Discogenic syndrome, lumbar, EE (eosinophilic esophagitis), Gastritis, GERD (gastroesophageal reflux disease), Hematuria, Hiatal hernia, Male pattern baldness, Mandibular retrognathism, Maxillary hyperplasia, Penile lesion, Prepatellar bursitis, Prostatic congestion, Reactive airway disease, and Tension headache. SURGICAL HISTORY      has a past surgical history that includes Upper gastrointestinal endoscopy; Colonoscopy; Inguinal hernia repair (Right, 12/19/13); Nose surgery; Ankle fracture surgery; Mandible surgery; Appendectomy; Abdomen surgery; Upper gastrointestinal endoscopy (06/08/2016); Elbow surgery; Upper gastrointestinal endoscopy (02/13/2019); Upper gastrointestinal endoscopy (2/13/2019); Endoscopy, colon, diagnostic; Colonoscopy (N/A, 3/22/2021); and Upper gastrointestinal endoscopy (N/A, 3/22/2021).     CURRENTMEDICATIONS       Previous Medications    ADVAIR DISKUS 100-50 MCG/DOSE DISKUS INHALER    1 PUFF(S) EVERY MORNING, 1 PUFF(S) EVERY EVENING    ALBUTEROL SULFATE  (90 BASE) MCG/ACT INHALER    Inhale 2 puffs into the lungs every 6 hours as needed for Wheezing or Shortness of Breath    ASCORBIC ACID (VITAMIN C) 250 MG TABLET    Take 250 mg by mouth daily    CALCIUM CARBONATE (OSCAL) 500 MG TABS TABLET    Take 500 mg by mouth daily    CHOLECALCIFEROL (VITAMIN D3) 125 MCG (5000 UT) TABS    Take by mouth    COENZYME Q10 (COQ-10 PO)    Take by mouth    ESOMEPRAZOLE (NEXIUM) 20 MG DELAYED RELEASE CAPSULE    Take 1 capsule by mouth 2 times daily    FAMOTIDINE (PEPCID) 20 MG TABLET    Take 1 tablet by mouth 2 times daily    FEXOFENADINE-PSEUDOEPHEDRINE (ALLEGRA-D 24 HOUR PO)    Take 1 tablet by mouth    IBUPROFEN (ADVIL;MOTRIN) 200 MG TABLET    Take 200 mg by mouth every 6 hours as needed for Pain    MAGNESIUM CITRATE SOLUTION    Take 296 mLs by mouth once    MONTELUKAST (SINGULAIR) 10 MG TABLET    Take 10 mg by mouth     VITAMIN B-6 (PYRIDOXINE) 100 MG TABLET    Take 100 mg by mouth daily    VITAMIN E 100 UNITS TABS    Take by mouth       ALLERGIES     has No Known Allergies. FAMILY HISTORY     He indicated that his mother is alive. He indicated that his father is alive. He indicated that the status of his other is unknown.     family history includes Arthritis in an other family member; Asthma in an other family member. SOCIAL HISTORY      reports that he has never smoked. He has never used smokeless tobacco. He reports previous alcohol use. He reports that he does not use drugs. PHYSICAL EXAM    (up to 7 for level 4, 8 or more for level 5)   INITIAL VITALS:  height is 6' 2\" (1.88 m) and weight is 74.8 kg (165 lb). His temperature is 99.9 °F (37.7 °C). His blood pressure is 106/56 (abnormal) and his pulse is 96. His respiration is 21 and oxygen saturation is 96%. Physical Exam  Vitals and nursing note reviewed. Constitutional:       General: He is not in acute distress. HENT:      Right Ear: Tympanic membrane, ear canal and external ear normal.      Left Ear: Tympanic membrane, ear canal and external ear normal.      Nose:      Right Sinus: No maxillary sinus tenderness or frontal sinus tenderness. Left Sinus: No maxillary sinus tenderness or frontal sinus tenderness. Mouth/Throat:      Mouth: Mucous membranes are moist.      Pharynx: Oropharynx is clear. Uvula midline. No pharyngeal swelling, oropharyngeal exudate, posterior oropharyngeal erythema or uvula swelling. Neck:      Meningeal: Brudzinski's sign and Kernig's sign absent. Cardiovascular:      Rate and Rhythm: Normal rate and regular rhythm. Pulses: Normal pulses.    Pulmonary:      Effort: Pulmonary effort is normal. No tachypnea, bradypnea, accessory muscle usage, prolonged expiration, respiratory distress or retractions. Breath sounds: Normal breath sounds and air entry. Chest:      Chest wall: No tenderness or crepitus. Abdominal:      General: Bowel sounds are normal.      Palpations: Abdomen is soft. Tenderness: There is no abdominal tenderness. There is no right CVA tenderness, left CVA tenderness, guarding or rebound. Negative signs include Osullivan's sign and McBurney's sign. Musculoskeletal:      Cervical back: No rigidity. No spinous process tenderness or muscular tenderness. Normal range of motion. Right lower leg: No edema. Left lower leg: No edema. Neurological:      GCS: GCS eye subscore is 4. GCS verbal subscore is 5. GCS motor subscore is 6. Sensory: Sensation is intact. Motor: Motor function is intact. Coordination: Coordination is intact. Gait: Gait is intact. DIFFERENTIAL DIAGNOSIS/ MDM:     Patient has possible exposure to COVID-19. He is coming in with shortness of breath and dehydration. His O2 sats are 96% on room air. Chest x-ray shows no evidence of pneumonia. He was hydrated with 1 L of normal saline. CBC and basic metabolic panel are normal.  Patient can get an outpatient Covid test done. We will need to quarantine until he has the results.     DIAGNOSTIC RESULTS     EKG: All EKG's are interpreted by the Emergency Department Physician who either signs or Co-signs this chart in the absence of a cardiologist.        RADIOLOGY:        Interpretation per the Radiologist below, if available at the time of this note:      XR CHEST PORTABLE    Result Date: 8/29/2021  EXAMINATION: [address] 8/29/2021 2:11 pm COMPARISON: 07/29/2020 HISTORY: ORDERING SYSTEM PROVIDED HISTORY: PT suspects he has covid, requesting an xr ay TECHNOLOGIST PROVIDED HISTORY: PT suspects he has covid, requesting an xr ay Reason for Exam: PT suspects he has covid, requesting an xr ay Acuity: Acute Type of Exam: Initial FINDINGS: The lungs are hyperinflated without acute focal process. There is no effusion or pneumothorax. The cardiomediastinal silhouette is without acute process. The osseous structures are without acute process. No acute process.        LABS:  Results for orders placed or performed during the hospital encounter of 08/29/21   CBC Auto Differential   Result Value Ref Range    WBC 5.5 3.5 - 11.0 k/uL    RBC 4.87 4.5 - 5.9 m/uL    Hemoglobin 15.0 13.5 - 17.5 g/dL    Hematocrit 44.0 41 - 53 %    MCV 90.5 80 - 100 fL    MCH 30.8 26 - 34 pg    MCHC 34.0 31 - 37 g/dL    RDW 12.7 12.5 - 15.4 %    Platelets 612 116 - 375 k/uL    MPV 7.9 6.0 - 12.0 fL    NRBC Automated NOT REPORTED per 100 WBC    Differential Type NOT REPORTED     Immature Granulocytes NOT REPORTED 0 %    Absolute Immature Granulocyte NOT REPORTED 0.00 - 0.30 k/uL    WBC Morphology NOT REPORTED     RBC Morphology NOT REPORTED     Platelet Estimate NOT REPORTED     Seg Neutrophils 81 (H) 36 - 66 %    Lymphocytes 14 (L) 24 - 44 %    Monocytes 5 1 - 7 %    Eosinophils % 0 (L) 1 - 4 %    Basophils 0 0 - 2 %    Segs Absolute 4.45 1.8 - 7.7 k/uL    Absolute Lymph # 0.77 (L) 1.0 - 4.8 k/uL    Absolute Mono # 0.28 0.1 - 0.8 k/uL    Absolute Eos # 0.00 0.0 - 0.4 k/uL    Basophils Absolute 0.00 0.0 - 0.2 k/uL    Morphology Normal    Basic Metabolic Panel w/ Reflex to MG   Result Value Ref Range    Glucose 88 70 - 99 mg/dL    BUN 9 6 - 20 mg/dL    CREATININE 1.00 0.70 - 1.20 mg/dL    Bun/Cre Ratio NOT REPORTED 9 - 20    Calcium 9.2 8.6 - 10.4 mg/dL    Sodium 139 135 - 144 mmol/L    Potassium 4.2 3.7 - 5.3 mmol/L    Chloride 106 98 - 107 mmol/L    CO2 21 20 - 31 mmol/L    Anion Gap 12 9 - 17 mmol/L    GFR Non-African American >60 >60 mL/min    GFR African American >60 >60 mL/min    GFR Comment          GFR Staging NOT REPORTED            EMERGENCY DEPARTMENT COURSE:   Vitals:    Vitals:    08/29/21 1650 08/29/21 1651 08/29/21 1652 08/29/21 1653   BP:   (!) 106/56 Pulse: 96      Resp: 17  21    Temp:    99.9 °F (37.7 °C)   SpO2: 96%  96%    Weight:  74.8 kg (165 lb)     Height:  6' 2\" (1.88 m)       -------------------------  BP: (!) 106/56, Temp: 99.9 °F (37.7 °C), Pulse: 96, Resp: 21      RE-EVALUATION:  6:59 PM EDT patient is feeling better after receiving 1 L of normal saline. Will be discharged home and will follow up as an outpatient to get Covid tested. CONSULTS:      PROCEDURES:  None    FINAL IMPRESSION      1. Suspected COVID-19 virus infection          DISPOSITION/PLAN   DISPOSITION        CONDITION ON DISPOSITION:   Improved    PATIENT REFERRED TO:  Ann Aguirre MD  [address] [place] [address]  [phone]    Call in 2 days        DISCHARGE MEDICATIONS:  New Prescriptions    ONDANSETRON (ZOFRAN-ODT) 4 MG DISINTEGRATING TABLET    Take 1 tablet by mouth 3 times daily as needed for Nausea or Vomiting       (Please note that portions of this note were completed with a voicerecognition program.  Efforts were made to edit the dictations but occasionally words are mis-transcribed. )    Candis Paget, DO,, MD, F.A.C.E.P.   Attending Emergency Medicine Physician        Frederic Mi DO  08/29/21 [id number]

## 2021-08-30 ENCOUNTER — TELEPHONE (OUTPATIENT)
Dept: FAMILY MEDICINE CLINIC | Age: 51
End: 2021-08-30

## 2021-08-30 NOTE — TELEPHONE ENCOUNTER
FYI:   Patient called stating he went to [facility] ED yesterday for fever,fatigue,cough,body aches, chills and headache, possible exposure. They did not test him for Covid. They did a chest xray that was normal.  He only has a note for work for today, and would like to be tested. He was directed to the [facility] and giving the information. Patient agreed to go. (see notes in the system).

## 2021-09-01 ENCOUNTER — TELEPHONE (OUTPATIENT)
Dept: FAMILY MEDICINE CLINIC | Age: 51
End: 2021-09-01

## 2021-09-01 SDOH — ECONOMIC STABILITY: FOOD INSECURITY: WITHIN THE PAST 12 MONTHS, YOU WORRIED THAT YOUR FOOD WOULD RUN OUT BEFORE YOU GOT MONEY TO BUY MORE.: NEVER TRUE

## 2021-09-01 SDOH — ECONOMIC STABILITY: FOOD INSECURITY: WITHIN THE PAST 12 MONTHS, THE FOOD YOU BOUGHT JUST DIDN'T LAST AND YOU DIDN'T HAVE MONEY TO GET MORE.: NEVER TRUE

## 2021-09-01 SDOH — ECONOMIC STABILITY: INCOME INSECURITY: HOW HARD IS IT FOR YOU TO PAY FOR THE VERY BASICS LIKE FOOD, HOUSING, MEDICAL CARE, AND HEATING?: NOT HARD AT ALL

## 2021-09-01 ASSESSMENT — PATIENT HEALTH QUESTIONNAIRE - PHQ9
SUM OF ALL RESPONSES TO PHQ QUESTIONS 1-9: 0
1. LITTLE INTEREST OR PLEASURE IN DOING THINGS: 0
SUM OF ALL RESPONSES TO PHQ9 QUESTIONS 1 & 2: 0
SUM OF ALL RESPONSES TO PHQ QUESTIONS 1-9: 0
SUM OF ALL RESPONSES TO PHQ QUESTIONS 1-9: 0
2. FEELING DOWN, DEPRESSED OR HOPELESS: 0

## 2021-09-01 NOTE — TELEPHONE ENCOUNTER
----- Message from [facility] sent at 9/1/2021  9:20 AM EDT -----  Subject: Message to Provider    QUESTIONS  Information for Provider? sx started friday, pt states he has covid,   weak/tried, nausea, fever off and on, has a VV tomorrow (09/02/21) at 3pm,   but if you there is anything sooner he would like notified  ---------------------------------------------------------------------------  --------------  [address]  What is the best way for the office to contact you? OK to leave message on   voicemail,Do not leave any message, patient will call back for answer  Preferred Call Back Phone Number? [phone]  ---------------------------------------------------------------------------  --------------  SCRIPT ANSWERS  Relationship to Patient?  Self

## 2021-09-01 NOTE — PROGRESS NOTES
minutes: 11-20 minutes    The visit was conducted pursuant to the emergency declaration under the [address] [place] [address] and the [employer] General Act. Patient identification was verified, and a caregiver was present when appropriate. The patient was located in a state where the provider was credentialed to provide care.   Fanta Croft MD  Note: not billable if this call serves to triage the patient into an appointment for the relevant concern

## 2021-09-01 NOTE — TELEPHONE ENCOUNTER
Advised the patient to attend the ED if symptoms worsen. He was also advised to self quarantine which he is already doing and to increase his fluid intake.

## 2021-09-02 ENCOUNTER — VIRTUAL VISIT (OUTPATIENT)
Dept: FAMILY MEDICINE CLINIC | Age: 51
End: 2021-09-02
Payer: COMMERCIAL

## 2021-09-02 DIAGNOSIS — U07.1 COVID-19: Primary | ICD-10-CM

## 2021-09-02 DIAGNOSIS — J45.909 ASTHMA, UNSPECIFIED ASTHMA SEVERITY, UNSPECIFIED WHETHER COMPLICATED, UNSPECIFIED WHETHER PERSISTENT: ICD-10-CM

## 2021-09-02 PROCEDURE — 99442 PR PHYS/QHP TELEPHONE EVALUATION 11-20 MIN: CPT | Performed by: FAMILY MEDICINE

## 2021-09-02 RX ORDER — METHYLPREDNISOLONE 4 MG/1
TABLET ORAL
Qty: 1 KIT | Refills: 0 | Status: SHIPPED | OUTPATIENT
Start: 2021-09-02

## 2021-11-12 ENCOUNTER — OFFICE VISIT (OUTPATIENT)
Dept: GASTROENTEROLOGY | Age: 51
End: 2021-11-12
Payer: COMMERCIAL

## 2021-11-12 ENCOUNTER — TELEPHONE (OUTPATIENT)
Dept: GASTROENTEROLOGY | Age: 51
End: 2021-11-12

## 2021-11-12 ENCOUNTER — HOSPITAL ENCOUNTER (OUTPATIENT)
Age: 51
Discharge: HOME | End: 2021-11-12
Payer: COMMERCIAL

## 2021-11-12 VITALS
TEMPERATURE: 98 F | HEART RATE: 74 BPM | WEIGHT: 166 LBS | DIASTOLIC BLOOD PRESSURE: 76 MMHG | BODY MASS INDEX: 21.31 KG/M2 | SYSTOLIC BLOOD PRESSURE: 116 MMHG

## 2021-11-12 DIAGNOSIS — Z77.098 OCCUPATIONAL EXPOSURE TO CHEMICALS: ICD-10-CM

## 2021-11-12 DIAGNOSIS — K20.0 EE (EOSINOPHILIC ESOPHAGITIS): Primary | ICD-10-CM

## 2021-11-12 DIAGNOSIS — K20.0 EE (EOSINOPHILIC ESOPHAGITIS): ICD-10-CM

## 2021-11-12 DIAGNOSIS — K44.9 HIATAL HERNIA: ICD-10-CM

## 2021-11-12 DIAGNOSIS — K21.9 GASTROESOPHAGEAL REFLUX DISEASE, UNSPECIFIED WHETHER ESOPHAGITIS PRESENT: ICD-10-CM

## 2021-11-12 DIAGNOSIS — F43.9 STRESS: ICD-10-CM

## 2021-11-12 LAB
ALBUMIN SERPL-MCNC: 4.2 G/DL (ref 3.5–5.2)
ALBUMIN/GLOBULIN RATIO: NORMAL (ref 1–2.5)
ALP BLD-CCNC: 58 U/L (ref 40–129)
ALT SERPL-CCNC: 11 U/L (ref 5–41)
AST SERPL-CCNC: 14 U/L
BILIRUB SERPL-MCNC: 0.54 MG/DL (ref 0.3–1.2)
BILIRUBIN DIRECT: 0.14 MG/DL
BILIRUBIN, INDIRECT: 0.4 MG/DL (ref 0–1)
GLOBULIN: NORMAL G/DL (ref 1.5–3.8)
TOTAL PROTEIN: 6.9 G/DL (ref 6.4–8.3)

## 2021-11-12 PROCEDURE — 3017F COLORECTAL CA SCREEN DOC REV: CPT | Performed by: INTERNAL MEDICINE

## 2021-11-12 PROCEDURE — 80076 HEPATIC FUNCTION PANEL: CPT

## 2021-11-12 PROCEDURE — G8484 FLU IMMUNIZE NO ADMIN: HCPCS | Performed by: INTERNAL MEDICINE

## 2021-11-12 PROCEDURE — G8427 DOCREV CUR MEDS BY ELIG CLIN: HCPCS | Performed by: INTERNAL MEDICINE

## 2021-11-12 PROCEDURE — 99214 OFFICE O/P EST MOD 30 MIN: CPT | Performed by: INTERNAL MEDICINE

## 2021-11-12 PROCEDURE — G8420 CALC BMI NORM PARAMETERS: HCPCS | Performed by: INTERNAL MEDICINE

## 2021-11-12 PROCEDURE — 1036F TOBACCO NON-USER: CPT | Performed by: INTERNAL MEDICINE

## 2021-11-12 PROCEDURE — 36415 COLL VENOUS BLD VENIPUNCTURE: CPT

## 2021-11-12 RX ORDER — SUCRALFATE ORAL 1 G/10ML
1 SUSPENSION ORAL 4 TIMES DAILY
Qty: 1200 ML | Refills: 3 | Status: SHIPPED | OUTPATIENT
Start: 2021-11-12 | End: 2021-11-12

## 2021-11-12 RX ORDER — SUCRALFATE 1 G/1
1 TABLET ORAL 4 TIMES DAILY
Qty: 120 TABLET | Refills: 3 | Status: SHIPPED | OUTPATIENT
Start: 2021-11-12

## 2021-11-12 ASSESSMENT — ENCOUNTER SYMPTOMS
RESPIRATORY NEGATIVE: 1
VOMITING: 0
ABDOMINAL DISTENTION: 1
ANAL BLEEDING: 0
DIARRHEA: 0
TROUBLE SWALLOWING: 0
NAUSEA: 0
EYES NEGATIVE: 1
BLOOD IN STOOL: 0
ABDOMINAL PAIN: 1
CONSTIPATION: 0
RECTAL PAIN: 0

## 2021-11-12 NOTE — PROGRESS NOTES
GI OFFICE FOLLOW UP    Jie West is a 46 y.o. male evaluated via on 11/12/2021. Consent:  He and/or health care decision maker is aware that that he may receive a bill for this telephone service, depending on his insurance coverage, and has provided verbal consent to proceed: YES      INTERVAL HISTORY:   No referring provider defined for this encounter. Chief Complaint   Patient presents with    Follow-up     Patient here to follow up on GERD c-o heartburn , patient states it is getting worst , patient also states his heartburn is keeping him up at night        1. EE (eosinophilic esophagitis)    2. Hiatal hernia    3. Gastroesophageal reflux disease, unspecified whether esophagitis present    4. Stress    5. Occupational exposure to chemicals      Seen in my office as a follow-up  Has been under stress lately  Working extra hour  Not sleeping much    Acid reflux is getting worse  Mainly in the morning hours  He is says that soon after taking Nexium his acid reflux comes back in few hours    Recently had exposure to chemicals at work and was evaluated in the emergency room subsequently got treated for that    No shortness of breath or cough    He is an athlete  Has history for hiatus hernia eosinophilic esophagitis taking proton vomiting per therapy    Complains of epigastric pain discomfort    Appears worried about her overall condition    No smoking alcohol abuse illicit drug usage      HISTORY OF PRESENT ILLNESS: Mr.Andrew Kimberlee Arriaga is a 46 y.o. male with a past history remarkable for , referred for evaluation of   Chief Complaint   Patient presents with    Follow-up     Patient here to follow up on GERD c-o heartburn , patient states it is getting worst , patient also states his heartburn is keeping him up at night    .     Past Medical,Family, and Social History reviewed and does contribute to the patient presenting condition. Patient's PMH/PSH,SH,PSYCH Hx, MEDs, ALLERGIES, and ROS were all reviewed and updated in the appropriate sections.     PAST MEDICAL HISTORY:  Past Medical History:   Diagnosis Date    Asthma     Discogenic syndrome, lumbar     EE (eosinophilic esophagitis) 10/75/9626    Gastritis     GERD (gastroesophageal reflux disease) 1/14/2015    Hematuria     Stress    Hiatal hernia     Male pattern baldness     Mandibular retrognathism     Maxillary hyperplasia     Penile lesion     Prepatellar bursitis     Right Knee    Prostatic congestion     Reactive airway disease     Tension headache        Past Surgical History:   Procedure Laterality Date    ABDOMEN SURGERY      ANKLE FRACTURE SURGERY      APPENDECTOMY      COLONOSCOPY      COLONOSCOPY N/A 3/22/2021    COLONOSCOPY POLYPECTOMY SNARE/COLD BIOPSY performed by Taoy Tanner MD at [address] [facility], COLON, DIAGNOSTIC      INGUINAL HERNIA REPAIR Right 12/19/13    MANDIBLE SURGERY      NOSE SURGERY      UPPER GASTROINTESTINAL ENDOSCOPY      UPPER GASTROINTESTINAL ENDOSCOPY  06/08/2016    hiatal hernia; gastritis    UPPER GASTROINTESTINAL ENDOSCOPY  28/37/1623    eosinophilic esophagitis    UPPER GASTROINTESTINAL ENDOSCOPY  2/13/2019    EGD BIOPSY performed by Tayo Tanner MD at [facility] N/A 3/22/2021    EGD BIOPSY performed by Tayo Tanner MD at [address] [facility] [address]:    Current Outpatient Medications:     sucralfate (CARAFATE) 1 GM/10ML suspension, Take 10 mLs by mouth 4 times daily, Disp: 1200 mL, Rfl: 3    methylPREDNISolone (MEDROL DOSEPACK) 4 MG tablet, Take by mouth as directed., Disp: 1 kit, Rfl: 0    esomeprazole (NEXIUM) 20 MG delayed release capsule, Take 20 mg by mouth 2 times daily, Disp: , Rfl:     ondansetron (ZOFRAN-ODT) 4 MG disintegrating tablet, Take 1 tablet by mouth 3 times daily as needed for Nausea tobacco: Never Used   Vaping Use    Vaping Use: Never used   Substance and Sexual Activity    Alcohol use: Not Currently     Comment: rare    Drug use: No    Sexual activity: Never   Other Topics Concern    Not on file   Social History Narrative    Not on file     Social Determinants of Health     Financial Resource Strain: Low Risk     Difficulty of Paying Living Expenses: Not hard at all   Food Insecurity: No Food Insecurity    Worried About [address] in the Last Year: Never true    [address] [facility] [address] in the Last Year: Never true   Transportation Needs:     Lack of Transportation (Medical): Not on file    Lack of Transportation (Non-Medical): Not on file   Physical Activity:     Days of Exercise per Week: Not on file    Minutes of Exercise per Session: Not on file   Stress:     Feeling of Stress : Not on file   Social Connections:     Frequency of Communication with Friends and Family: Not on file    Frequency of Social Gatherings with Friends and Family: Not on file    Attends [employer] Services: Not on file    Active Member of [address] [place] [address] or Organizations: Not on file    Attends Club or Organization Meetings: Not on file    Marital Status: Not on file   Intimate Partner Violence:     Fear of Current or Ex-Partner: Not on file    Emotionally Abused: Not on file    Physically Abused: Not on file    Sexually Abused: Not on file   Housing Stability:     Unable to Pay for Housing in the Last Year: Not on file    Number of [place] in the Last Year: Not on file    Unstable Housing in the Last Year: Not on file         REVIEW OF SYSTEMS:         Review of Systems   Constitutional: Positive for fatigue. Negative for appetite change and unexpected weight change. HENT: Negative. Negative for trouble swallowing. Eyes: Negative. Respiratory: Negative. Cardiovascular: Negative. Gastrointestinal: Positive for abdominal distention and abdominal pain (off and on).  Negative for anal bleeding, blood in stool, constipation, diarrhea, nausea, rectal pain and vomiting. Endocrine: Negative. Genitourinary: Negative. Musculoskeletal: Negative for gait problem. Left leg issues currently   Skin: Negative. Allergic/Immunologic: Positive for environmental allergies. Neurological: Positive for headaches (migraines). Hematological: Bruises/bleeds easily. Psychiatric/Behavioral: Positive for sleep disturbance. PHYSICAL EXAMINATION: Vital signs reviewed per the nursing documentation. /76   Pulse 74   Temp 98 °F (36.7 °C)   Wt 166 lb (75.3 kg)   BMI 21.31 kg/m²   Body mass index is 21.31 kg/m². Physical Exam  Nursing note reviewed. Constitutional:       Appearance: He is well-developed. Comments: Anxious    HENT:      Head: Normocephalic and atraumatic. Eyes:      Conjunctiva/sclera: Conjunctivae normal.      Pupils: Pupils are equal, round, and reactive to light. Cardiovascular:      Rate and Rhythm: Normal rate and regular rhythm. Pulmonary:      Effort: Pulmonary effort is normal.      Breath sounds: Normal breath sounds. Abdominal:      General: Bowel sounds are normal.      Palpations: Abdomen is soft. Comments: NON TENDER, NON DISTENTED  LIVER SPLEEN AND HERNIAS ARE NOT  PALPABLE  BOWEL SOUNDS ARE POSITIVE      Genitourinary:     Rectum: Normal.   Musculoskeletal:         General: Normal range of motion. Cervical back: Normal range of motion and neck supple. Skin:     General: Skin is warm. Neurological:      Mental Status: He is alert and oriented to person, place, and time. Deep Tendon Reflexes: Reflexes are normal and symmetric.            LABORATORY DATA: Reviewed  Lab Results   Component Value Date    WBC 5.5 08/29/2021    HGB 15.0 08/29/2021    HCT 44.0 08/29/2021    MCV 90.5 08/29/2021     08/29/2021     08/29/2021    K 4.2 08/29/2021     08/29/2021    CO2 21 08/29/2021    BUN 9 08/29/2021 the patient and/or health care decision maker about   Details of this discussion including any medical advice provided:YES      I affirm this is a Patient Initiated Episode with an Established Patient who has not had a related appointment within my department in the past 7 days or scheduled within the next 24 hours. Total Time: minutes: 21-30 minutes    Note: not billable if this call serves to triage the patient into an appointment for the relevant concern      Thank you for allowing me to participate in the care of Mr. Godfrey Halsted. For any further questions please do not hesitate to contact me. I have reviewed and agree with the ROS entered by the MA/LPN.          Yuli Guzman MD, [facility]  Board Certified in Gastroenterology and [address] [place] [address] Gastroenterology  Office #: [phone]

## 2021-11-12 NOTE — TELEPHONE ENCOUNTER
No increase on  PPI. Patient should trial Carafate before being reassessed. Andrés Flaherty can call pt on Monday.

## 2021-11-12 NOTE — TELEPHONE ENCOUNTER
Patient called and stated she found a ortho doctor who will see her for her spine pain. Dr. Beckwith Ba  Ph# [phone]. She needs a referral and notes to be fax to this doctor.   I gave to Viji to please give to Dr. Bronson Brewer. Patient called stating his medication is not covered by insurance and it is not ready, said he needs it today, please advise.

## 2021-11-12 NOTE — TELEPHONE ENCOUNTER
Patient states his heartburn is getting worse and wants to increase his PPI and get his new medication as soon as possible. Advised patient on dietary triggers. Sending GERD triggers and EoE diet to patient in the mail. Changing liquid carafate to tablets due to cost so he can have medication today. Advised against large meals/ eating before sleep. Patient thanked Marcella Yip. *Please ask  on Monday if it is okay for patient to go back to taking PPI BID at 40mg. *

## 2021-11-15 ENCOUNTER — TELEPHONE (OUTPATIENT)
Dept: GASTROENTEROLOGY | Age: 51
End: 2021-11-15

## 2021-11-15 NOTE — TELEPHONE ENCOUNTER
Pharmacy called for med refill for patient . Pharmacy stated that tablets would be covered if patient does not mind taking them please advise / Message sent to Moon Murphy asking what to do       Spoke with Moon Murphy .  This has been addressed

## 2021-11-15 NOTE — TELEPHONE ENCOUNTER
Medication refill wanted / patient is not getting that medication from this provider / Maicol Azevedo with [facility] she has handled this

## 2021-12-03 ENCOUNTER — TELEPHONE (OUTPATIENT)
Dept: GASTROENTEROLOGY | Age: 51
End: 2021-12-03

## 2021-12-03 NOTE — TELEPHONE ENCOUNTER
Patient left message stating he has been leaving multiple messages stating his MRI was not covered by insurance so he would not be able to get that done, would like Dr. Avendano nurse to call him back to discuss next steps, states he is still having stomach pains, please advise.

## 2021-12-07 NOTE — TELEPHONE ENCOUNTER
Ewelina Gipson, please call insurance company and find out the denial reason and attempt to appeal.  No denial letter is on file in chart.

## 2021-12-08 NOTE — TELEPHONE ENCOUNTER
Contacted patient he will call when he wakes up from his nap to give me his health ins.  Information

## 2021-12-08 NOTE — TELEPHONE ENCOUNTER
Have tried to contact patient however LM is full . It looks like patient maybe a self pay.  I will try to contact again later

## 2022-02-03 ENCOUNTER — TELEPHONE (OUTPATIENT)
Dept: GASTROENTEROLOGY | Age: 52
End: 2022-02-03

## 2022-02-03 NOTE — TELEPHONE ENCOUNTER
Pt called and LVM stating he had a missed call. I called the pt back, and let him know it was a call and confirm for Friday's appt. I let the pt know if there is a level 3 our office will be closed, and he'll have to call in and ann his appt.

## 2022-02-04 ENCOUNTER — TELEPHONE (OUTPATIENT)
Dept: GASTROENTEROLOGY | Age: 52
End: 2022-02-04

## 2022-02-04 NOTE — TELEPHONE ENCOUNTER
Tried calling pt to inform him that appt today has been cancelled due to weather, pts vm was full.  Writer calld and left message informing Thereasa Gent of cancellation

## 2022-03-16 ENCOUNTER — OFFICE VISIT (OUTPATIENT)
Dept: GASTROENTEROLOGY | Age: 52
End: 2022-03-16

## 2022-03-16 VITALS
SYSTOLIC BLOOD PRESSURE: 120 MMHG | WEIGHT: 165 LBS | TEMPERATURE: 97 F | HEART RATE: 80 BPM | BODY MASS INDEX: 21.18 KG/M2 | DIASTOLIC BLOOD PRESSURE: 79 MMHG

## 2022-03-16 DIAGNOSIS — K20.0 EE (EOSINOPHILIC ESOPHAGITIS): Primary | ICD-10-CM

## 2022-03-16 DIAGNOSIS — R10.84 ABDOMINAL PAIN, GENERALIZED: ICD-10-CM

## 2022-03-16 DIAGNOSIS — F43.9 STRESS: ICD-10-CM

## 2022-03-16 DIAGNOSIS — K21.9 GASTROESOPHAGEAL REFLUX DISEASE, UNSPECIFIED WHETHER ESOPHAGITIS PRESENT: ICD-10-CM

## 2022-03-16 DIAGNOSIS — K44.9 HIATAL HERNIA: ICD-10-CM

## 2022-03-16 PROCEDURE — 99214 OFFICE O/P EST MOD 30 MIN: CPT | Performed by: INTERNAL MEDICINE

## 2022-03-16 ASSESSMENT — ENCOUNTER SYMPTOMS
RECTAL PAIN: 0
DIARRHEA: 0
SORE THROAT: 0
COLOR CHANGE: 0
VOMITING: 0
ANAL BLEEDING: 0
NAUSEA: 0
TROUBLE SWALLOWING: 0
ABDOMINAL DISTENTION: 0
CONSTIPATION: 0
COUGH: 0
BLOOD IN STOOL: 0
WHEEZING: 0
CHOKING: 0
SHORTNESS OF BREATH: 0
ABDOMINAL PAIN: 1

## 2022-03-16 NOTE — PROGRESS NOTES
GI CLINIC FOLLOW UP    INTERVAL HISTORY:   No referring provider defined for this encounter. Chief Complaint   Patient presents with    Follow-up     Pt is here today for a f/u on labs. Dr. Gomez López MD requested that you be seen for the evaluation of   1. EE (eosinophilic esophagitis)    2. Stress    3. Hiatal hernia    4. Gastroesophageal reflux disease, unspecified whether esophagitis present    5. Abdominal pain, generalized    . Patient seen my office as a follow-up  Has been reviewing a lot of stress lately  Hands some epigastric discomfort and pain MRCP was ordered but insurance did not approve it  He says that that abdominal pain is little better  Denies any nausea vomiting  Has history for eosinophilic esophagitis been on proton pump inhibitor therapy because of work schedule he takes 3 pills of 20 mg 2 at night and 1 5 6 hours later for the complete relief of 24 hours for his acid reflux indigestion symptoms    He is a healthy eater otherwise an athlete    Has history for hiatus hernia    No smoking alcohol abuse illicit drug usage    Previous records and charts were reviewed with    HISTORY OF PRESENT ILLNESS: Mr.Andrew Makenna Cai is a 46 y.o. male , referred for evaluation of. Past Medical,Family, and Social History reviewed and does contribute to the patient presentingcondition. Patient's PMH/PSH,SH,PSYCH Hx, MEDs, ALLERGIES, and ROS were all reviewed and updated in the appropriate sections.     PAST MEDICAL HISTORY:  Past Medical History:   Diagnosis Date    Asthma     Discogenic syndrome, lumbar     EE (eosinophilic esophagitis) 74/50/8729    Gastritis     GERD (gastroesophageal reflux disease) 1/14/2015    Hematuria     Stress    Hiatal hernia     Male pattern baldness     Mandibular retrognathism     Maxillary hyperplasia     Penile lesion     Prepatellar bursitis     Right Knee    Prostatic congestion     Reactive airway disease     Tension headache        Past Surgical History:   Procedure Laterality Date    ABDOMEN SURGERY      ANKLE FRACTURE SURGERY      APPENDECTOMY      COLONOSCOPY      COLONOSCOPY N/A 3/22/2021    COLONOSCOPY POLYPECTOMY SNARE/COLD BIOPSY performed by Sita Jackson MD at [address], COLON, DIAGNOSTIC      INGUINAL HERNIA REPAIR Right 12/19/13    MANDIBLE SURGERY      NOSE SURGERY      UPPER GASTROINTESTINAL ENDOSCOPY      UPPER GASTROINTESTINAL ENDOSCOPY  06/08/2016    hiatal hernia; gastritis    UPPER GASTROINTESTINAL ENDOSCOPY  97/56/9468    eosinophilic esophagitis    UPPER GASTROINTESTINAL ENDOSCOPY  2/13/2019    EGD BIOPSY performed by Sita Jackson MD at [address] N/A 3/22/2021    EGD BIOPSY performed by Sita Jackson MD at [address] [facility] [address]:    Current Outpatient Medications:     esomeprazole (NEXIUM) 20 MG delayed release capsule, Take 1 capsule by mouth 2 times daily, Disp: 180 capsule, Rfl: 3    sucralfate (CARAFATE) 1 GM tablet, Take 1 tablet by mouth 4 times daily Can crush tablets and mix into drink to make a slurry, Disp: 120 tablet, Rfl: 3    methylPREDNISolone (MEDROL DOSEPACK) 4 MG tablet, Take by mouth as directed., Disp: 1 kit, Rfl: 0    ondansetron (ZOFRAN-ODT) 4 MG disintegrating tablet, Take 1 tablet by mouth 3 times daily as needed for Nausea or Vomiting, Disp: 21 tablet, Rfl: 0    Coenzyme Q10 (COQ-10 PO), Take by mouth, Disp: , Rfl:     Vitamin E 100 units TABS, Take by mouth , Disp: , Rfl:     magnesium citrate solution, Take 296 mLs by mouth once , Disp: , Rfl:     calcium carbonate (OSCAL) 500 MG TABS tablet, Take 500 mg by mouth daily, Disp: , Rfl:     ibuprofen (ADVIL;MOTRIN) 200 MG tablet, Take 200 mg by mouth every 6 hours as needed for Pain, Disp: , Rfl:     Cholecalciferol (VITAMIN D3) 125 MCG (5000 UT) TABS, Take by mouth, Disp: , Rfl:     vitamin B-6 (PYRIDOXINE) 100 MG tablet, Take 100 file   Stress:     Feeling of Stress : Not on file   Social Connections:     Frequency of Communication with Friends and Family: Not on file    Frequency of Social Gatherings with Friends and Family: Not on file    Attends Christian Services: Not on file    Active Member of [address] [place] [address] or Organizations: Not on file    Attends Club or Organization Meetings: Not on file    Marital Status: Not on file   Intimate Partner Violence:     Fear of Current or Ex-Partner: Not on file    Emotionally Abused: Not on file    Physically Abused: Not on file    Sexually Abused: Not on file   Housing Stability:     Unable to Pay for Housing in the Last Year: Not on file    Number of [place] in the Last Year: Not on file    Unstable Housing in the Last Year: Not on file       REVIEW OF SYSTEMS: A 12-point review of systemswas obtained and pertinent positives and negatives were enumerated above in the history of present illness. All other reviewed systems / symptoms were negative. Review of Systems   Constitutional: Negative for appetite change, fatigue and unexpected weight change. HENT: Negative for sore throat and trouble swallowing. Eyes: Negative for visual disturbance. Respiratory: Negative for cough, choking, shortness of breath and wheezing. Cardiovascular: Negative for chest pain, palpitations and leg swelling. Gastrointestinal: Positive for abdominal pain. Negative for abdominal distention, anal bleeding, blood in stool, constipation, diarrhea, nausea, rectal pain and vomiting. Skin: Negative for color change. Allergic/Immunologic: Negative for environmental allergies and food allergies. Neurological: Negative for dizziness, weakness, light-headedness, numbness and headaches. Hematological: Does not bruise/bleed easily. Psychiatric/Behavioral: Negative for confusion and sleep disturbance. The patient is not nervous/anxious.             LABORATORY DATA: Reviewed  Lab Results   Component Value Date    WBC 5.5 08/29/2021    HGB 15.0 08/29/2021    HCT 44.0 08/29/2021    MCV 90.5 08/29/2021     08/29/2021     08/29/2021    K 4.2 08/29/2021     08/29/2021    CO2 21 08/29/2021    BUN 9 08/29/2021    CREATININE 1.00 08/29/2021    LABALBU 4.2 11/12/2021    BILITOT 0.54 11/12/2021    ALKPHOS 58 11/12/2021    AST 14 11/12/2021    ALT 11 11/12/2021         Lab Results   Component Value Date    RBC 4.87 08/29/2021    HGB 15.0 08/29/2021    MCV 90.5 08/29/2021    MCH 30.8 08/29/2021    MCHC 34.0 08/29/2021    RDW 12.7 08/29/2021    MPV 7.9 08/29/2021    BASOPCT 0 08/29/2021    LYMPHSABS 0.77 (L) 08/29/2021    MONOSABS 0.28 08/29/2021    NEUTROABS 4.45 08/29/2021    EOSABS 0.00 08/29/2021    BASOSABS 0.00 08/29/2021         DIAGNOSTIC TESTING:     No results found. PHYSICAL EXAMINATION: Vital signs reviewed per the nursing documentation. /79   Pulse 80   Temp 97 °F (36.1 °C)   Wt 165 lb (74.8 kg)   BMI 21.18 kg/m²   Body mass index is 21.18 kg/m². Physical Exam  Nursing note reviewed. Constitutional:       Appearance: He is well-developed. Comments: Anxious    HENT:      Head: Normocephalic and atraumatic. Eyes:      Conjunctiva/sclera: Conjunctivae normal.      Pupils: Pupils are equal, round, and reactive to light. Cardiovascular:      Rate and Rhythm: Normal rate and regular rhythm. Pulmonary:      Effort: Pulmonary effort is normal.      Breath sounds: Normal breath sounds. Abdominal:      General: Bowel sounds are normal.      Palpations: Abdomen is soft. Comments: Mild epig  TENDER, NON DISTENTED  LIVER SPLEEN AND HERNIAS ARE NOT  PALPABLE  BOWEL SOUNDS ARE POSITIVE      Genitourinary:     Rectum: Normal.   Musculoskeletal:         General: Normal range of motion. Cervical back: Normal range of motion and neck supple. Skin:     General: Skin is warm. Neurological:      Mental Status: He is alert and oriented to person, place, and time. Deep Tendon Reflexes: Reflexes are normal and symmetric. IMPRESSION: Mr. Rafa Whitney is a 46 y.o. male with     Pt was discussed in detail about the possible side effects of proton pump inhibiter therapy. He was explained about the possibility of calcium and magnesium malabsorption and was advised to start taking calcium supplements with Vit D. Some over the counter regimens were explained to patient. Some dietary advices were also given. He has verbalized understanding and agreement to this. Pt was asked to chew food well  Take time in eating  Sit up or prop up when eating  Don't talk when eating  Walk after finish eating  Use liquids with meals if has issues  The patient has verbalized understanding and agreemenet to this. See allergist    Pt was advised in detail about some life style and dietary modifications. He was advised about avoidance of caffeine, nicotine and chocolate. Pt was also told to stay away from any kind of fast foods, soda pops. He was also advised to avoid lots of spices, grease and fried food etc.     Instructions were also given about trying to arrange the timing, quality and quantity of food. Instructions were given about using ample amount of fiber including dietary and supplemental fiber either metamucil, bennafiber or citrucell etc.  Pt was advised about drinking ample amount of water without any colors or chemicals. Stress was given about regular exercise. Pt has verbalized understanding and agreement to these modifications.       The patient was instructed to start taking some OTC Probiotics products   These are available over the counter at the Pharmacy stores and Grocery stores  He was explained about the beneficial effects they have in the GI track  They will help to establish the good bacterial gloria and will help with the digestion and bowel movements  The patient has verbalized understanding and agreement to this plan        Diet/life style/natural hx /complication of the dx were all explained in details   Past medical, past surgical, social history, psychiatric history, medications or allergies, all reviewed and  updated    Thank you for allowing me to participate in the care of Mr. Samantha Echeverria. For any further questions please do not hesitate to contact me. I have reviewed and agree with the ROS entered by the MA/RN. Note is dictated utilizing voice recognition software. Unfortunately this leads to occasional typographical errors. Please contact our office if you have any questions.       Edmundo Moreira MD, [facility]  Board Certified in Gastroenterology and [address] [place] [address] Gastroenterology  Office #: [phone]

## 2023-02-21 DIAGNOSIS — K21.9 GASTROESOPHAGEAL REFLUX DISEASE, UNSPECIFIED WHETHER ESOPHAGITIS PRESENT: Primary | ICD-10-CM

## 2023-02-21 DIAGNOSIS — K20.0 EE (EOSINOPHILIC ESOPHAGITIS): ICD-10-CM

## 2023-02-21 DIAGNOSIS — K44.9 HIATAL HERNIA: ICD-10-CM

## 2023-07-14 PROBLEM — K22.2 ESOPHAGEAL STRICTURE: Status: ACTIVE | Noted: 2023-07-14

## 2023-09-22 ENCOUNTER — OFFICE VISIT (OUTPATIENT)
Dept: FAMILY MEDICINE CLINIC | Age: 53
End: 2023-09-22
Payer: COMMERCIAL

## 2023-09-22 VITALS — HEIGHT: 74 IN | HEART RATE: 90 BPM | WEIGHT: 166.2 LBS | BODY MASS INDEX: 21.33 KG/M2 | OXYGEN SATURATION: 96 %

## 2023-09-22 DIAGNOSIS — J45.30 MILD PERSISTENT ASTHMA WITHOUT COMPLICATION: ICD-10-CM

## 2023-09-22 DIAGNOSIS — B02.9 HERPES ZOSTER WITHOUT COMPLICATION: Primary | ICD-10-CM

## 2023-09-22 PROCEDURE — 99213 OFFICE O/P EST LOW 20 MIN: CPT | Performed by: FAMILY MEDICINE

## 2023-09-22 RX ORDER — FLUTICASONE PROPIONATE AND SALMETEROL 100; 50 UG/1; UG/1
1 POWDER RESPIRATORY (INHALATION) EVERY 12 HOURS
Qty: 1 EACH | Refills: 3 | Status: SHIPPED | OUTPATIENT
Start: 2023-09-22

## 2023-09-22 RX ORDER — VALACYCLOVIR HYDROCHLORIDE 1 G/1
1000 TABLET, FILM COATED ORAL 3 TIMES DAILY
Qty: 21 TABLET | Refills: 0 | Status: SHIPPED | OUTPATIENT
Start: 2023-09-22 | End: 2023-09-29

## 2023-09-22 SDOH — ECONOMIC STABILITY: FOOD INSECURITY: WITHIN THE PAST 12 MONTHS, THE FOOD YOU BOUGHT JUST DIDN'T LAST AND YOU DIDN'T HAVE MONEY TO GET MORE.: NEVER TRUE

## 2023-09-22 SDOH — ECONOMIC STABILITY: HOUSING INSECURITY
IN THE LAST 12 MONTHS, WAS THERE A TIME WHEN YOU DID NOT HAVE A STEADY PLACE TO SLEEP OR SLEPT IN A SHELTER (INCLUDING NOW)?: NO

## 2023-09-22 SDOH — ECONOMIC STABILITY: FOOD INSECURITY: WITHIN THE PAST 12 MONTHS, YOU WORRIED THAT YOUR FOOD WOULD RUN OUT BEFORE YOU GOT MONEY TO BUY MORE.: NEVER TRUE

## 2023-09-22 SDOH — ECONOMIC STABILITY: INCOME INSECURITY: HOW HARD IS IT FOR YOU TO PAY FOR THE VERY BASICS LIKE FOOD, HOUSING, MEDICAL CARE, AND HEATING?: NOT HARD AT ALL

## 2023-09-22 ASSESSMENT — PATIENT HEALTH QUESTIONNAIRE - PHQ9
SUM OF ALL RESPONSES TO PHQ QUESTIONS 1-9: 0
1. LITTLE INTEREST OR PLEASURE IN DOING THINGS: 0
2. FEELING DOWN, DEPRESSED OR HOPELESS: 0
SUM OF ALL RESPONSES TO PHQ QUESTIONS 1-9: 0
SUM OF ALL RESPONSES TO PHQ9 QUESTIONS 1 & 2: 0

## 2023-09-22 ASSESSMENT — ENCOUNTER SYMPTOMS
ABDOMINAL PAIN: 0
CHEST TIGHTNESS: 0
SHORTNESS OF BREATH: 0
BLOOD IN STOOL: 0
BACK PAIN: 1

## 2023-09-22 NOTE — PROGRESS NOTES
Subjective:      Patient ID: Naldo Lee is a 46 y.o. male. HPI  Visit Information    Have you changed or started any medications since your last visit including any over-the-counter medicines, vitamins, or herbal medicines? no   Are you having any side effects from any of your medications? -  no  Have you stopped taking any of your medications? Is so, why? -  yes - vitamins    Have you seen any other physician or provider since your last visit? No  Have you had any other diagnostic tests since your last visit? No  Have you been seen in the emergency room and/or had an admission to a hospital since we last saw you? No  Have you had your routine dental cleaning in the past 6 months? no    Have you activated your [employer] account? If not, what are your barriers? No:      Patient Care Team:  Mita Harden MD as PCP - General (Family Medicine)  Mita Harden MD as PCP - Empaneled Provider  Girma Olguin MD as Consulting Physician (Gastroenterology)    Medical History Review  Past Medical, Family, and Social History reviewed and does contribute to the patient presenting condition    Health Maintenance   Topic Date Due    Hepatitis B vaccine (1 of 3 - 3-dose series) Never done    COVID-19 Vaccine (1) Never done    Pneumococcal 0-64 years Vaccine (1 - PCV) Never done    Depression Screen  Never done    HIV screen  Never done    Hepatitis C screen  Never done    Lipids  08/28/2020    Shingles vaccine (1 of 2) Never done    Flu vaccine (1) Never done    DTaP/Tdap/Td vaccine (2 - Td or Tdap) 09/07/2025    Colorectal Cancer Screen  03/22/2026    Hepatitis A vaccine  Aged Out    Hib vaccine  Aged Out    Meningococcal (ACWY) vaccine  Aged Out   Patient is a 59-year-old white male who presents for asthma and possible shingles. He states that several days ago he started having right-sided back pain with a rash. He states that the rash appeared yesterday on the right side of his back.   He also requests

## 2024-03-20 RX ORDER — MONTELUKAST SODIUM 10 MG/1
TABLET ORAL
Qty: 90 TABLET | Refills: 0 | Status: SHIPPED | OUTPATIENT
Start: 2024-03-20

## 2024-07-08 RX ORDER — MONTELUKAST SODIUM 10 MG/1
10 TABLET ORAL EVERY EVENING
Qty: 90 TABLET | Refills: 0 | Status: SHIPPED | OUTPATIENT
Start: 2024-07-08

## 2024-08-08 ENCOUNTER — TELEPHONE (OUTPATIENT)
Dept: FAMILY MEDICINE CLINIC | Age: 54
End: 2024-08-08

## 2024-08-08 DIAGNOSIS — Z12.5 SCREENING PSA (PROSTATE SPECIFIC ANTIGEN): ICD-10-CM

## 2024-08-08 DIAGNOSIS — K21.9 GASTROESOPHAGEAL REFLUX DISEASE, UNSPECIFIED WHETHER ESOPHAGITIS PRESENT: ICD-10-CM

## 2024-08-08 DIAGNOSIS — Z79.899 CURRENT USE OF PROTON PUMP INHIBITOR: ICD-10-CM

## 2024-08-08 DIAGNOSIS — Z11.59 NEED FOR HEPATITIS C SCREENING TEST: ICD-10-CM

## 2024-08-08 DIAGNOSIS — Z00.00 ANNUAL PHYSICAL EXAM: Primary | ICD-10-CM

## 2024-08-08 DIAGNOSIS — E55.9 VITAMIN D DEFICIENCY: ICD-10-CM

## 2024-08-08 NOTE — TELEPHONE ENCOUNTER
----- Message from Yaakov Page sent at 8/8/2024  9:12 AM EDT -----  Regarding: ECC Message to Provider  ECC Message to Provider    Relationship to Patient: Self     Additional Information: Patient wants to know whether he's needed to do a blood work prior to his appointment in 9/9/2024 with PCP, Elan Shields MD.  --------------------------------------------------------------------------------------------------------------------------    Call Back Information: OK to leave message on voicemail  Preferred Call Back Number: Phone [phone]

## 2024-09-05 ENCOUNTER — HOSPITAL ENCOUNTER (OUTPATIENT)
Age: 54
Discharge: HOME | End: 2024-09-05
Payer: COMMERCIAL

## 2024-09-05 DIAGNOSIS — Z12.5 SCREENING PSA (PROSTATE SPECIFIC ANTIGEN): ICD-10-CM

## 2024-09-05 DIAGNOSIS — E55.9 VITAMIN D DEFICIENCY: ICD-10-CM

## 2024-09-05 DIAGNOSIS — K21.9 GASTROESOPHAGEAL REFLUX DISEASE, UNSPECIFIED WHETHER ESOPHAGITIS PRESENT: ICD-10-CM

## 2024-09-05 DIAGNOSIS — Z11.59 NEED FOR HEPATITIS C SCREENING TEST: ICD-10-CM

## 2024-09-05 DIAGNOSIS — Z00.00 ANNUAL PHYSICAL EXAM: ICD-10-CM

## 2024-09-05 DIAGNOSIS — Z79.899 CURRENT USE OF PROTON PUMP INHIBITOR: ICD-10-CM

## 2024-09-05 LAB
25(OH)D3 SERPL-MCNC: 28.4 NG/ML (ref 30–100)
ALBUMIN SERPL-MCNC: 4.1 G/DL (ref 3.5–5.2)
ALP SERPL-CCNC: 61 U/L (ref 40–129)
ALT SERPL-CCNC: 11 U/L (ref 5–41)
ANION GAP SERPL CALCULATED.3IONS-SCNC: 6 MMOL/L (ref 9–17)
AST SERPL-CCNC: 13 U/L
BASOPHILS # BLD: 0.1 K/UL (ref 0–0.2)
BASOPHILS NFR BLD: 1 % (ref 0–2)
BILIRUB SERPL-MCNC: 0.7 MG/DL (ref 0.3–1.2)
BUN SERPL-MCNC: 12 MG/DL (ref 6–20)
CALCIUM SERPL-MCNC: 9.1 MG/DL (ref 8.6–10.4)
CHLORIDE SERPL-SCNC: 102 MMOL/L (ref 98–107)
CHOLEST SERPL-MCNC: 206 MG/DL
CHOLESTEROL/HDL RATIO: 4.6
CO2 SERPL-SCNC: 31 MMOL/L (ref 20–31)
CREAT SERPL-MCNC: 1 MG/DL (ref 0.7–1.2)
EOSINOPHIL # BLD: 0.3 K/UL (ref 0–0.4)
EOSINOPHILS RELATIVE PERCENT: 4 % (ref 0–4)
ERYTHROCYTE [DISTWIDTH] IN BLOOD BY AUTOMATED COUNT: 14.5 % (ref 11.5–14.9)
GFR, ESTIMATED: 90 ML/MIN/1.73M2
GLUCOSE SERPL-MCNC: 102 MG/DL (ref 70–99)
HCT VFR BLD AUTO: 43.7 % (ref 41–53)
HDLC SERPL-MCNC: 45 MG/DL
HGB BLD-MCNC: 14.5 G/DL (ref 13.5–17.5)
LDLC SERPL CALC-MCNC: 136 MG/DL (ref 0–130)
LYMPHOCYTES NFR BLD: 1.8 K/UL (ref 1–4.8)
LYMPHOCYTES RELATIVE PERCENT: 24 % (ref 24–44)
MAGNESIUM SERPL-MCNC: 2.2 MG/DL (ref 1.6–2.6)
MCH RBC QN AUTO: 29.1 PG (ref 26–34)
MCHC RBC AUTO-ENTMCNC: 33.2 G/DL (ref 31–37)
MCV RBC AUTO: 87.8 FL (ref 80–100)
MONOCYTES NFR BLD: 0.4 K/UL (ref 0.1–1.3)
MONOCYTES NFR BLD: 5 % (ref 1–7)
NEUTROPHILS NFR BLD: 66 % (ref 36–66)
NEUTS SEG NFR BLD: 5 K/UL (ref 1.3–9.1)
PLATELET # BLD AUTO: 206 K/UL (ref 150–450)
PMV BLD AUTO: 8.4 FL (ref 6–12)
POTASSIUM SERPL-SCNC: 4.4 MMOL/L (ref 3.7–5.3)
PROT SERPL-MCNC: 6.7 G/DL (ref 6.4–8.3)
RBC # BLD AUTO: 4.98 M/UL (ref 4.5–5.9)
SODIUM SERPL-SCNC: 139 MMOL/L (ref 135–144)
TRIGL SERPL-MCNC: 124 MG/DL
WBC OTHER # BLD: 7.5 K/UL (ref 3.5–11)

## 2024-09-05 PROCEDURE — 82306 VITAMIN D 25 HYDROXY: CPT

## 2024-09-05 PROCEDURE — 83735 ASSAY OF MAGNESIUM: CPT

## 2024-09-05 PROCEDURE — 80061 LIPID PANEL: CPT

## 2024-09-05 PROCEDURE — 82746 ASSAY OF FOLIC ACID SERUM: CPT

## 2024-09-05 PROCEDURE — G0103 PSA SCREENING: HCPCS

## 2024-09-05 PROCEDURE — 82607 VITAMIN B-12: CPT

## 2024-09-05 PROCEDURE — 84630 ASSAY OF ZINC: CPT

## 2024-09-05 PROCEDURE — 86803 HEPATITIS C AB TEST: CPT

## 2024-09-05 PROCEDURE — 36415 COLL VENOUS BLD VENIPUNCTURE: CPT

## 2024-09-05 PROCEDURE — 80053 COMPREHEN METABOLIC PANEL: CPT

## 2024-09-05 PROCEDURE — 85025 COMPLETE CBC W/AUTO DIFF WBC: CPT

## 2024-09-06 LAB
FOLATE SERPL-MCNC: 8 NG/ML (ref 4.8–24.2)
HCV AB SERPL QL IA: NONREACTIVE
PSA SERPL-MCNC: 1.1 NG/ML (ref 0–4)
VIT B12 SERPL-MCNC: 326 PG/ML (ref 232–1245)

## 2024-09-08 LAB — ZINC SERPL-MCNC: 79.6 UG/DL (ref 60–120)

## 2024-09-20 ENCOUNTER — OFFICE VISIT (OUTPATIENT)
Dept: FAMILY MEDICINE CLINIC | Age: 54
End: 2024-09-20
Payer: COMMERCIAL

## 2024-09-20 VITALS
BODY MASS INDEX: 23.64 KG/M2 | OXYGEN SATURATION: 98 % | HEIGHT: 74 IN | HEART RATE: 69 BPM | SYSTOLIC BLOOD PRESSURE: 102 MMHG | DIASTOLIC BLOOD PRESSURE: 70 MMHG | RESPIRATION RATE: 14 BRPM | WEIGHT: 184.2 LBS

## 2024-09-20 DIAGNOSIS — E55.9 VITAMIN D DEFICIENCY: ICD-10-CM

## 2024-09-20 DIAGNOSIS — R40.0 HAS DAYTIME DROWSINESS: ICD-10-CM

## 2024-09-20 DIAGNOSIS — Z00.00 ANNUAL PHYSICAL EXAM: Primary | ICD-10-CM

## 2024-09-20 DIAGNOSIS — M54.2 NECK PAIN: ICD-10-CM

## 2024-09-20 DIAGNOSIS — J45.40 MODERATE PERSISTENT ASTHMA WITHOUT COMPLICATION: ICD-10-CM

## 2024-09-20 PROCEDURE — 99396 PREV VISIT EST AGE 40-64: CPT | Performed by: FAMILY MEDICINE

## 2024-09-20 RX ORDER — FLUTICASONE PROPIONATE AND SALMETEROL 100; 50 UG/1; UG/1
1 POWDER RESPIRATORY (INHALATION) EVERY 12 HOURS
Qty: 1 EACH | Refills: 5 | Status: SHIPPED | OUTPATIENT
Start: 2024-09-20

## 2024-09-20 RX ORDER — ERGOCALCIFEROL 1.25 MG/1
50000 CAPSULE, LIQUID FILLED ORAL WEEKLY
Qty: 4 CAPSULE | Refills: 5 | Status: SHIPPED | OUTPATIENT
Start: 2024-09-20

## 2024-09-20 RX ORDER — MONTELUKAST SODIUM 10 MG/1
10 TABLET ORAL EVERY EVENING
Qty: 90 TABLET | Refills: 1 | Status: SHIPPED | OUTPATIENT
Start: 2024-09-20

## 2024-09-20 SDOH — ECONOMIC STABILITY: FOOD INSECURITY: WITHIN THE PAST 12 MONTHS, YOU WORRIED THAT YOUR FOOD WOULD RUN OUT BEFORE YOU GOT MONEY TO BUY MORE.: NEVER TRUE

## 2024-09-20 SDOH — ECONOMIC STABILITY: INCOME INSECURITY: HOW HARD IS IT FOR YOU TO PAY FOR THE VERY BASICS LIKE FOOD, HOUSING, MEDICAL CARE, AND HEATING?: NOT HARD AT ALL

## 2024-09-20 SDOH — ECONOMIC STABILITY: FOOD INSECURITY: WITHIN THE PAST 12 MONTHS, THE FOOD YOU BOUGHT JUST DIDN'T LAST AND YOU DIDN'T HAVE MONEY TO GET MORE.: NEVER TRUE

## 2024-09-20 ASSESSMENT — PATIENT HEALTH QUESTIONNAIRE - PHQ9
SUM OF ALL RESPONSES TO PHQ QUESTIONS 1-9: 0
SUM OF ALL RESPONSES TO PHQ QUESTIONS 1-9: 0
2. FEELING DOWN, DEPRESSED OR HOPELESS: NOT AT ALL
SUM OF ALL RESPONSES TO PHQ QUESTIONS 1-9: 0
SUM OF ALL RESPONSES TO PHQ QUESTIONS 1-9: 0
1. LITTLE INTEREST OR PLEASURE IN DOING THINGS: NOT AT ALL
SUM OF ALL RESPONSES TO PHQ9 QUESTIONS 1 & 2: 0

## 2024-09-20 ASSESSMENT — ENCOUNTER SYMPTOMS
BLOOD IN STOOL: 0
SHORTNESS OF BREATH: 0
CHEST TIGHTNESS: 0
ABDOMINAL PAIN: 0

## 2024-09-23 ENCOUNTER — TELEPHONE (OUTPATIENT)
Dept: FAMILY MEDICINE CLINIC | Age: 54
End: 2024-09-23

## 2024-09-23 NOTE — TELEPHONE ENCOUNTER
Left message for pt to return call.      Proair Respiclick is not covered under patient's insurance plan.  Pharmacist wanted to know if we could send Proair Aerosol instead (Covered).

## 2024-09-26 NOTE — TELEPHONE ENCOUNTER
[facility] called again today.  Waiting on patient to return my call.  The Respiclick is not covered, cost would be $87.85.     61.2

## 2025-01-23 ENCOUNTER — OFFICE VISIT (OUTPATIENT)
Dept: FAMILY MEDICINE CLINIC | Age: 55
End: 2025-01-23
Payer: COMMERCIAL

## 2025-01-23 VITALS
TEMPERATURE: 98.4 F | OXYGEN SATURATION: 98 % | HEART RATE: 88 BPM | RESPIRATION RATE: 16 BRPM | DIASTOLIC BLOOD PRESSURE: 76 MMHG | BODY MASS INDEX: 23.62 KG/M2 | WEIGHT: 184 LBS | SYSTOLIC BLOOD PRESSURE: 102 MMHG

## 2025-01-23 DIAGNOSIS — J45.41 MODERATE PERSISTENT ASTHMA WITH ACUTE EXACERBATION: Primary | ICD-10-CM

## 2025-01-23 PROCEDURE — 99213 OFFICE O/P EST LOW 20 MIN: CPT | Performed by: FAMILY MEDICINE

## 2025-01-23 RX ORDER — ALBUTEROL SULFATE 90 UG/1
2 INHALANT RESPIRATORY (INHALATION) EVERY 6 HOURS PRN
Qty: 18 G | Refills: 3 | Status: CANCELLED | OUTPATIENT
Start: 2025-01-23

## 2025-01-23 RX ORDER — METHYLPREDNISOLONE 4 MG/1
TABLET ORAL
Qty: 1 KIT | Refills: 0 | Status: SHIPPED | OUTPATIENT
Start: 2025-01-23

## 2025-01-23 SDOH — ECONOMIC STABILITY: FOOD INSECURITY: WITHIN THE PAST 12 MONTHS, THE FOOD YOU BOUGHT JUST DIDN'T LAST AND YOU DIDN'T HAVE MONEY TO GET MORE.: NEVER TRUE

## 2025-01-23 SDOH — ECONOMIC STABILITY: FOOD INSECURITY: WITHIN THE PAST 12 MONTHS, YOU WORRIED THAT YOUR FOOD WOULD RUN OUT BEFORE YOU GOT MONEY TO BUY MORE.: NEVER TRUE

## 2025-01-23 ASSESSMENT — PATIENT HEALTH QUESTIONNAIRE - PHQ9
2. FEELING DOWN, DEPRESSED OR HOPELESS: NOT AT ALL
SUM OF ALL RESPONSES TO PHQ QUESTIONS 1-9: 0
1. LITTLE INTEREST OR PLEASURE IN DOING THINGS: NOT AT ALL
SUM OF ALL RESPONSES TO PHQ QUESTIONS 1-9: 0
SUM OF ALL RESPONSES TO PHQ9 QUESTIONS 1 & 2: 0

## 2025-01-23 ASSESSMENT — ENCOUNTER SYMPTOMS
WHEEZING: 1
BLOOD IN STOOL: 0
ABDOMINAL PAIN: 0
SHORTNESS OF BREATH: 1

## 2025-01-23 NOTE — PROGRESS NOTES
[facility]     Date of Visit:  2025  Patient Name: Christiano Rider   Patient :  1970     CHIEF COMPLAINT:     Christiano Rider is a 54 y.o. male who presents today for an general visit to be evaluated for the following condition(s):  Chief Complaint   Patient presents with    Asthma     CHEST AND THROAT,CONGESTION,PND-COUGH NOT REALLY PRODUCTIVE WHEN HE HAS TO BRING SOMETHING UP, SOB, WHEEZING, BODY ACHES, FATIGUE, NO FEVER OR CHILLS X JUST SINCE YESTERDAY     Medication Refill     VENTOLIN, PROAIR RESPICLICK       HISTORY OF PRESENT ILLNESS:       HPI   Visit Information    Have you changed or started any medications since your last visit including any over-the-counter medicines, vitamins, or herbal medicines? no   Are you having any side effects from any of your medications? -  no  Have you stopped taking any of your medications? Is so, why? -  yes - ADVAIR    Have you seen any other physician or provider since your last visit? Yes - Records Obtained  Have you had any other diagnostic tests since your last visit? No  Have you been seen in the emergency room and/or had an admission to a hospital since we last saw you? Yes - Records Obtained  Have you had your routine dental cleaning in the past 6 months? yes -     Have you activated your [employer] account? If not, what are your barriers? NO    Patient Care Team:  Elan Shields MD as PCP - General (Family Medicine)  Elan Shields MD as PCP - Empaneled Provider  Roxanne Ware MD as Consulting Physician (Gastroenterology)    Medical History Review  Past Medical, Family, and Social History reviewed and does contribute to the patient presenting condition    Health Maintenance   Topic Date Due    Pneumococcal 0-64 years Vaccine (1 of 2 - PCV) Never done    HIV screen  Never done    Hepatitis B vaccine (1 of 3 - 19+ 3-dose series) Never done    Shingles vaccine (1 of 2) Never done    Flu vaccine (1) Never done    COVID-19

## 2025-01-27 ENCOUNTER — HOSPITAL ENCOUNTER (INPATIENT)
Age: 55
LOS: 2 days | Discharge: ANOTHER HOSPITAL | DRG: 394 | End: 2025-01-29
Attending: STUDENT IN AN ORGANIZED HEALTH CARE EDUCATION/TRAINING PROGRAM | Admitting: INTERNAL MEDICINE
Payer: COMMERCIAL

## 2025-01-27 ENCOUNTER — TELEPHONE (OUTPATIENT)
Dept: FAMILY MEDICINE CLINIC | Age: 55
End: 2025-01-27

## 2025-01-27 DIAGNOSIS — K92.2 ACUTE UPPER GI BLEED: Primary | ICD-10-CM

## 2025-01-27 PROBLEM — E87.6 HYPOKALEMIA: Status: ACTIVE | Noted: 2025-01-27

## 2025-01-27 LAB
ALBUMIN SERPL-MCNC: 3.7 G/DL (ref 3.5–5.2)
ALBUMIN/GLOB SERPL: 1.6 {RATIO}
ALP SERPL-CCNC: 53 U/L (ref 40–129)
ALT SERPL-CCNC: 9 U/L (ref 10–50)
ANION GAP SERPL CALCULATED.3IONS-SCNC: 10 MMOL/L (ref 9–16)
AST SERPL-CCNC: 12 U/L (ref 10–50)
BASOPHILS # BLD: 0.1 K/UL (ref 0–0.2)
BASOPHILS NFR BLD: 1 % (ref 0–2)
BILIRUB SERPL-MCNC: <0.2 MG/DL (ref 0–1.2)
BUN SERPL-MCNC: 35 MG/DL (ref 6–20)
CALCIUM SERPL-MCNC: 8.6 MG/DL (ref 8.6–10.4)
CHLORIDE SERPL-SCNC: 103 MMOL/L (ref 98–107)
CO2 SERPL-SCNC: 25 MMOL/L (ref 20–31)
CREAT SERPL-MCNC: 1.1 MG/DL (ref 0.7–1.2)
EOSINOPHIL # BLD: 0.2 K/UL (ref 0–0.4)
EOSINOPHILS RELATIVE PERCENT: 2 % (ref 1–4)
ERYTHROCYTE [DISTWIDTH] IN BLOOD BY AUTOMATED COUNT: 13.7 % (ref 12.5–15.4)
GFR, ESTIMATED: 80 ML/MIN/1.73M2
GLUCOSE SERPL-MCNC: 134 MG/DL (ref 74–99)
HCT VFR BLD AUTO: 32.1 % (ref 41–53)
HGB BLD-MCNC: 11.1 G/DL (ref 13.5–17.5)
LIPASE SERPL-CCNC: 19 U/L (ref 13–60)
LYMPHOCYTES NFR BLD: 3.4 K/UL (ref 1–4.8)
LYMPHOCYTES RELATIVE PERCENT: 30 % (ref 24–44)
MCH RBC QN AUTO: 30 PG (ref 26–34)
MCHC RBC AUTO-ENTMCNC: 34.7 G/DL (ref 31–37)
MCV RBC AUTO: 86.6 FL (ref 80–100)
MONOCYTES NFR BLD: 0.6 K/UL (ref 0.1–1.2)
MONOCYTES NFR BLD: 5 % (ref 2–11)
NEUTROPHILS NFR BLD: 62 % (ref 36–66)
NEUTS SEG NFR BLD: 7.3 K/UL (ref 1.8–7.7)
PLATELET # BLD AUTO: 263 K/UL (ref 140–450)
PMV BLD AUTO: 7.9 FL (ref 6–12)
POTASSIUM SERPL-SCNC: 3.3 MMOL/L (ref 3.7–5.3)
PROT SERPL-MCNC: 6 G/DL (ref 6.6–8.7)
RBC # BLD AUTO: 3.7 M/UL (ref 4.5–5.9)
SODIUM SERPL-SCNC: 138 MMOL/L (ref 136–145)
WBC OTHER # BLD: 11.6 K/UL (ref 3.5–11)

## 2025-01-27 PROCEDURE — 99222 1ST HOSP IP/OBS MODERATE 55: CPT

## 2025-01-27 PROCEDURE — 2580000003 HC RX 258

## 2025-01-27 PROCEDURE — 6360000002 HC RX W HCPCS: Performed by: STUDENT IN AN ORGANIZED HEALTH CARE EDUCATION/TRAINING PROGRAM

## 2025-01-27 PROCEDURE — 36415 COLL VENOUS BLD VENIPUNCTURE: CPT

## 2025-01-27 PROCEDURE — 6360000002 HC RX W HCPCS

## 2025-01-27 PROCEDURE — 2580000003 HC RX 258: Performed by: NURSE PRACTITIONER

## 2025-01-27 PROCEDURE — 80053 COMPREHEN METABOLIC PANEL: CPT

## 2025-01-27 PROCEDURE — 83690 ASSAY OF LIPASE: CPT

## 2025-01-27 PROCEDURE — 96374 THER/PROPH/DIAG INJ IV PUSH: CPT

## 2025-01-27 PROCEDURE — 99285 EMERGENCY DEPT VISIT HI MDM: CPT

## 2025-01-27 PROCEDURE — 96361 HYDRATE IV INFUSION ADD-ON: CPT

## 2025-01-27 PROCEDURE — 2580000003 HC RX 258: Performed by: STUDENT IN AN ORGANIZED HEALTH CARE EDUCATION/TRAINING PROGRAM

## 2025-01-27 PROCEDURE — 85025 COMPLETE CBC W/AUTO DIFF WBC: CPT

## 2025-01-27 PROCEDURE — 1200000000 HC SEMI PRIVATE

## 2025-01-27 RX ORDER — ACETAMINOPHEN 325 MG/1
650 TABLET ORAL EVERY 6 HOURS PRN
Status: DISCONTINUED | OUTPATIENT
Start: 2025-01-27 | End: 2025-01-29 | Stop reason: HOSPADM

## 2025-01-27 RX ORDER — SODIUM CHLORIDE 9 MG/ML
INJECTION, SOLUTION INTRAVENOUS CONTINUOUS
Status: DISCONTINUED | OUTPATIENT
Start: 2025-01-27 | End: 2025-01-29 | Stop reason: HOSPADM

## 2025-01-27 RX ORDER — ONDANSETRON 2 MG/ML
4 INJECTION INTRAMUSCULAR; INTRAVENOUS EVERY 6 HOURS PRN
Status: DISCONTINUED | OUTPATIENT
Start: 2025-01-27 | End: 2025-01-29 | Stop reason: HOSPADM

## 2025-01-27 RX ORDER — POTASSIUM CHLORIDE 1500 MG/1
40 TABLET, EXTENDED RELEASE ORAL ONCE
Status: DISCONTINUED | OUTPATIENT
Start: 2025-01-27 | End: 2025-01-27

## 2025-01-27 RX ORDER — MAGNESIUM SULFATE IN WATER 40 MG/ML
2000 INJECTION, SOLUTION INTRAVENOUS PRN
Status: DISCONTINUED | OUTPATIENT
Start: 2025-01-27 | End: 2025-01-29 | Stop reason: HOSPADM

## 2025-01-27 RX ORDER — SODIUM CHLORIDE 9 MG/ML
INJECTION, SOLUTION INTRAVENOUS PRN
Status: DISCONTINUED | OUTPATIENT
Start: 2025-01-27 | End: 2025-01-29 | Stop reason: HOSPADM

## 2025-01-27 RX ORDER — POTASSIUM CHLORIDE 1500 MG/1
40 TABLET, EXTENDED RELEASE ORAL PRN
Status: DISCONTINUED | OUTPATIENT
Start: 2025-01-27 | End: 2025-01-29 | Stop reason: HOSPADM

## 2025-01-27 RX ORDER — MORPHINE SULFATE 2 MG/ML
2 INJECTION, SOLUTION INTRAMUSCULAR; INTRAVENOUS EVERY 4 HOURS PRN
Status: DISCONTINUED | OUTPATIENT
Start: 2025-01-27 | End: 2025-01-29 | Stop reason: HOSPADM

## 2025-01-27 RX ORDER — ACETAMINOPHEN 650 MG/1
650 SUPPOSITORY RECTAL EVERY 6 HOURS PRN
Status: DISCONTINUED | OUTPATIENT
Start: 2025-01-27 | End: 2025-01-29 | Stop reason: HOSPADM

## 2025-01-27 RX ORDER — SODIUM CHLORIDE 0.9 % (FLUSH) 0.9 %
5-40 SYRINGE (ML) INJECTION PRN
Status: DISCONTINUED | OUTPATIENT
Start: 2025-01-27 | End: 2025-01-29 | Stop reason: HOSPADM

## 2025-01-27 RX ORDER — SODIUM CHLORIDE 0.9 % (FLUSH) 0.9 %
5-40 SYRINGE (ML) INJECTION EVERY 12 HOURS SCHEDULED
Status: DISCONTINUED | OUTPATIENT
Start: 2025-01-27 | End: 2025-01-29 | Stop reason: HOSPADM

## 2025-01-27 RX ORDER — 0.9 % SODIUM CHLORIDE 0.9 %
1000 INTRAVENOUS SOLUTION INTRAVENOUS ONCE
Status: COMPLETED | OUTPATIENT
Start: 2025-01-27 | End: 2025-01-27

## 2025-01-27 RX ORDER — ONDANSETRON 4 MG/1
4 TABLET, ORALLY DISINTEGRATING ORAL EVERY 8 HOURS PRN
Status: DISCONTINUED | OUTPATIENT
Start: 2025-01-27 | End: 2025-01-29 | Stop reason: HOSPADM

## 2025-01-27 RX ORDER — POTASSIUM CHLORIDE 7.45 MG/ML
10 INJECTION INTRAVENOUS PRN
Status: DISCONTINUED | OUTPATIENT
Start: 2025-01-27 | End: 2025-01-29 | Stop reason: HOSPADM

## 2025-01-27 RX ADMIN — SODIUM CHLORIDE 1000 ML: 9 INJECTION, SOLUTION INTRAVENOUS at 18:49

## 2025-01-27 RX ADMIN — SODIUM CHLORIDE 80 MG: 9 INJECTION INTRAMUSCULAR; INTRAVENOUS; SUBCUTANEOUS at 18:50

## 2025-01-27 RX ADMIN — SODIUM CHLORIDE: 9 INJECTION, SOLUTION INTRAVENOUS at 23:15

## 2025-01-27 RX ADMIN — SODIUM CHLORIDE 8 MG/HR: 9 INJECTION, SOLUTION INTRAVENOUS at 23:18

## 2025-01-27 ASSESSMENT — ENCOUNTER SYMPTOMS
NAUSEA: 0
VOMITING: 0
ANAL BLEEDING: 0
CONSTIPATION: 0
ABDOMINAL PAIN: 1
NAUSEA: 1
WHEEZING: 0
SHORTNESS OF BREATH: 0
EYE REDNESS: 0
SORE THROAT: 0
BLOOD IN STOOL: 1

## 2025-01-27 ASSESSMENT — PAIN - FUNCTIONAL ASSESSMENT: PAIN_FUNCTIONAL_ASSESSMENT: 0-10

## 2025-01-27 ASSESSMENT — LIFESTYLE VARIABLES: HOW OFTEN DO YOU HAVE A DRINK CONTAINING ALCOHOL: NEVER

## 2025-01-27 NOTE — ED PROVIDER NOTES
[facility]  [address] [facility] [address] [address]  Phone: [phone]        [facility] EMERGENCY DEPARTMENT  EMERGENCY DEPARTMENT ENCOUNTER      Pt Name: Christiano Rider  MRN: [id number]  Birthdate 1970  Date of evaluation: 1/27/2025  Provider: Luciana Ocampo DO    CHIEF COMPLAINT       Chief Complaint   Patient presents with    Abdominal Pain     Pt states on new med and stool has been different, and having abdominal pain       HISTORY OF PRESENT ILLNESS   (Location/Symptom, Timing/Onset,Context/Setting, Quality, Duration, Modifying Factors, Severity)  Note limiting factors.     Christiano Rider is a 54 y.o. male who presents to the emergency department with concern for epigastric abdominal pain.  Patient states has been ongoing for a few weeks, getting progressively worse.  Patient states he is always had some trouble with his stomach, does take Nexium.  Does follow with GI, Dr. Vasquez with [facility].  Patient states he has had multiple scopes in the past both upper and lower.  Reports they have always been unremarkable.  Patient states that he has been feeling some nauseousness, no vomiting.  Patient states he recently has been on 2 bouts of steroids for asthma and bronchitis and he feels like this may have upset his stomach as well.  Patient was concerned because he started having dark and tarry stools, states that he told this to his primary doctor and they recommended evaluation in the ER.  Patient does not take any blood thinners.    Nursing Notes were reviewed.    REVIEW OF SYSTEMS       Review of Systems   Constitutional:  Negative for chills and fever.   Respiratory:  Negative for shortness of breath.    Cardiovascular:  Negative for chest pain.   Gastrointestinal:  Positive for abdominal pain, blood in stool and nausea. Negative for anal bleeding and vomiting.       PAST MEDICAL HISTORY     Past Medical History:   Diagnosis Date    Asthma

## 2025-01-28 ENCOUNTER — ANESTHESIA EVENT (OUTPATIENT)
Dept: OPERATING ROOM | Age: 55
DRG: 394 | End: 2025-01-28
Payer: COMMERCIAL

## 2025-01-28 ENCOUNTER — APPOINTMENT (OUTPATIENT)
Dept: CT IMAGING | Age: 55
DRG: 394 | End: 2025-01-28
Payer: COMMERCIAL

## 2025-01-28 ENCOUNTER — ANESTHESIA (OUTPATIENT)
Dept: OPERATING ROOM | Age: 55
DRG: 394 | End: 2025-01-28
Payer: COMMERCIAL

## 2025-01-28 LAB
ALBUMIN SERPL-MCNC: 3.1 G/DL (ref 3.5–5.2)
ALBUMIN/GLOB SERPL: 1.8 {RATIO} (ref 1–2.5)
ALP SERPL-CCNC: 39 U/L (ref 40–129)
ALT SERPL-CCNC: 9 U/L (ref 10–50)
ANION GAP SERPL CALCULATED.3IONS-SCNC: 7 MMOL/L (ref 9–16)
AST SERPL-CCNC: 10 U/L (ref 10–50)
BILIRUB SERPL-MCNC: 0.3 MG/DL (ref 0–1.2)
BUN SERPL-MCNC: 29 MG/DL (ref 6–20)
CALCIUM SERPL-MCNC: 7.8 MG/DL (ref 8.6–10.4)
CHLORIDE SERPL-SCNC: 110 MMOL/L (ref 98–107)
CO2 SERPL-SCNC: 23 MMOL/L (ref 20–31)
CREAT SERPL-MCNC: 0.8 MG/DL (ref 0.7–1.2)
FERRITIN SERPL-MCNC: 15 NG/ML
FOLATE SERPL-MCNC: 8.7 NG/ML (ref 4.8–24.2)
GFR, ESTIMATED: >90 ML/MIN/1.73M2
GLUCOSE SERPL-MCNC: 104 MG/DL (ref 74–99)
HCT VFR BLD AUTO: 22.9 % (ref 40.7–50.3)
HCT VFR BLD AUTO: 23.4 % (ref 40.7–50.3)
HCT VFR BLD AUTO: 23.6 % (ref 40.7–50.3)
HCT VFR BLD AUTO: 25.4 % (ref 40.7–50.3)
HCT VFR BLD AUTO: 26.3 % (ref 40.7–50.3)
HGB BLD-MCNC: 7.9 G/DL (ref 13–17)
HGB BLD-MCNC: 8.1 G/DL (ref 13–17)
HGB BLD-MCNC: 8.2 G/DL (ref 13–17)
HGB BLD-MCNC: 8.8 G/DL (ref 13–17)
HGB BLD-MCNC: 8.8 G/DL (ref 13–17)
IMM RETICS NFR: 22.2 % (ref 2.7–18.3)
INR PPP: 1.2
IRON SATN MFR SERPL: 14 % (ref 20–55)
IRON SERPL-MCNC: 39 UG/DL (ref 61–157)
MAGNESIUM SERPL-MCNC: 1.8 MG/DL (ref 1.6–2.6)
PARTIAL THROMBOPLASTIN TIME: 28.1 SEC (ref 23.9–33.8)
POTASSIUM SERPL-SCNC: 3.9 MMOL/L (ref 3.7–5.3)
PROT SERPL-MCNC: 4.9 G/DL (ref 6.6–8.7)
PROTHROMBIN TIME: 14.8 SEC (ref 11.5–14.2)
RETIC HEMOGLOBIN: 33.5 PG (ref 28.2–35.7)
RETICS # AUTO: 0.08 M/UL (ref 0.03–0.08)
RETICS/RBC NFR AUTO: 2.7 % (ref 0.5–1.9)
SODIUM SERPL-SCNC: 141 MMOL/L (ref 136–145)
TIBC SERPL-MCNC: 286 UG/DL (ref 250–450)
UNSATURATED IRON BINDING CAPACITY: 247 UG/DL (ref 112–347)
VIT B12 SERPL-MCNC: 244 PG/ML (ref 232–1245)

## 2025-01-28 PROCEDURE — 6360000002 HC RX W HCPCS

## 2025-01-28 PROCEDURE — 83550 IRON BINDING TEST: CPT

## 2025-01-28 PROCEDURE — 7100000001 HC PACU RECOVERY - ADDTL 15 MIN: Performed by: INTERNAL MEDICINE

## 2025-01-28 PROCEDURE — 3700000000 HC ANESTHESIA ATTENDED CARE: Performed by: INTERNAL MEDICINE

## 2025-01-28 PROCEDURE — 85610 PROTHROMBIN TIME: CPT

## 2025-01-28 PROCEDURE — 2580000003 HC RX 258: Performed by: INTERNAL MEDICINE

## 2025-01-28 PROCEDURE — P9016 RBC LEUKOCYTES REDUCED: HCPCS

## 2025-01-28 PROCEDURE — 83540 ASSAY OF IRON: CPT

## 2025-01-28 PROCEDURE — 86850 RBC ANTIBODY SCREEN: CPT

## 2025-01-28 PROCEDURE — 2500000003 HC RX 250 WO HCPCS: Performed by: INTERNAL MEDICINE

## 2025-01-28 PROCEDURE — 36415 COLL VENOUS BLD VENIPUNCTURE: CPT

## 2025-01-28 PROCEDURE — 3700000001 HC ADD 15 MINUTES (ANESTHESIA): Performed by: INTERNAL MEDICINE

## 2025-01-28 PROCEDURE — 82728 ASSAY OF FERRITIN: CPT

## 2025-01-28 PROCEDURE — 0DJ08ZZ INSPECTION OF UPPER INTESTINAL TRACT, VIA NATURAL OR ARTIFICIAL OPENING ENDOSCOPIC: ICD-10-PCS | Performed by: INTERNAL MEDICINE

## 2025-01-28 PROCEDURE — 2500000003 HC RX 250 WO HCPCS: Performed by: NURSE PRACTITIONER

## 2025-01-28 PROCEDURE — 2580000003 HC RX 258: Performed by: NURSE PRACTITIONER

## 2025-01-28 PROCEDURE — 1200000000 HC SEMI PRIVATE

## 2025-01-28 PROCEDURE — 82607 VITAMIN B-12: CPT

## 2025-01-28 PROCEDURE — 74176 CT ABD & PELVIS W/O CONTRAST: CPT

## 2025-01-28 PROCEDURE — 6360000002 HC RX W HCPCS: Performed by: INTERNAL MEDICINE

## 2025-01-28 PROCEDURE — 83735 ASSAY OF MAGNESIUM: CPT

## 2025-01-28 PROCEDURE — 6370000000 HC RX 637 (ALT 250 FOR IP)

## 2025-01-28 PROCEDURE — 85014 HEMATOCRIT: CPT

## 2025-01-28 PROCEDURE — 2580000003 HC RX 258

## 2025-01-28 PROCEDURE — 2709999900 HC NON-CHARGEABLE SUPPLY: Performed by: INTERNAL MEDICINE

## 2025-01-28 PROCEDURE — 86920 COMPATIBILITY TEST SPIN: CPT

## 2025-01-28 PROCEDURE — 86900 BLOOD TYPING SEROLOGIC ABO: CPT

## 2025-01-28 PROCEDURE — 82746 ASSAY OF FOLIC ACID SERUM: CPT

## 2025-01-28 PROCEDURE — 85045 AUTOMATED RETICULOCYTE COUNT: CPT

## 2025-01-28 PROCEDURE — 85018 HEMOGLOBIN: CPT

## 2025-01-28 PROCEDURE — 86901 BLOOD TYPING SEROLOGIC RH(D): CPT

## 2025-01-28 PROCEDURE — 85730 THROMBOPLASTIN TIME PARTIAL: CPT

## 2025-01-28 PROCEDURE — 3609017100 HC EGD: Performed by: INTERNAL MEDICINE

## 2025-01-28 PROCEDURE — 80053 COMPREHEN METABOLIC PANEL: CPT

## 2025-01-28 PROCEDURE — 36430 TRANSFUSION BLD/BLD COMPNT: CPT

## 2025-01-28 PROCEDURE — 6370000000 HC RX 637 (ALT 250 FOR IP): Performed by: INTERNAL MEDICINE

## 2025-01-28 PROCEDURE — 99232 SBSQ HOSP IP/OBS MODERATE 35: CPT | Performed by: INTERNAL MEDICINE

## 2025-01-28 PROCEDURE — 7100000000 HC PACU RECOVERY - FIRST 15 MIN: Performed by: INTERNAL MEDICINE

## 2025-01-28 RX ORDER — PHENYLEPHRINE HCL IN 0.9% NACL 1 MG/10 ML
SYRINGE (ML) INTRAVENOUS
Status: DISCONTINUED | OUTPATIENT
Start: 2025-01-28 | End: 2025-01-28 | Stop reason: SDUPTHER

## 2025-01-28 RX ORDER — SODIUM CHLORIDE 9 MG/ML
INJECTION, SOLUTION INTRAVENOUS PRN
Status: DISCONTINUED | OUTPATIENT
Start: 2025-01-28 | End: 2025-01-29 | Stop reason: HOSPADM

## 2025-01-28 RX ORDER — PROPOFOL 10 MG/ML
INJECTION, EMULSION INTRAVENOUS
Status: DISCONTINUED | OUTPATIENT
Start: 2025-01-28 | End: 2025-01-28 | Stop reason: SDUPTHER

## 2025-01-28 RX ORDER — LIDOCAINE HYDROCHLORIDE 20 MG/ML
INJECTION, SOLUTION EPIDURAL; INFILTRATION; INTRACAUDAL; PERINEURAL
Status: DISCONTINUED | OUTPATIENT
Start: 2025-01-28 | End: 2025-01-28 | Stop reason: SDUPTHER

## 2025-01-28 RX ADMIN — ONDANSETRON 4 MG: 2 INJECTION INTRAMUSCULAR; INTRAVENOUS at 23:50

## 2025-01-28 RX ADMIN — LIDOCAINE HYDROCHLORIDE 50 MG: 20 INJECTION, SOLUTION EPIDURAL; INFILTRATION; INTRACAUDAL; PERINEURAL at 13:28

## 2025-01-28 RX ADMIN — MORPHINE SULFATE 2 MG: 2 INJECTION, SOLUTION INTRAMUSCULAR; INTRAVENOUS at 09:25

## 2025-01-28 RX ADMIN — Medication 100 MCG: at 13:38

## 2025-01-28 RX ADMIN — POLYETHYLENE GLYCOL-3350 AND ELECTROLYTES 4000 ML: 236; 6.74; 5.86; 2.97; 22.74 POWDER, FOR SOLUTION ORAL at 21:38

## 2025-01-28 RX ADMIN — PROPOFOL 30 MG: 10 INJECTION, EMULSION INTRAVENOUS at 13:34

## 2025-01-28 RX ADMIN — PROPOFOL 50 MG: 10 INJECTION, EMULSION INTRAVENOUS at 13:28

## 2025-01-28 RX ADMIN — SODIUM CHLORIDE, PRESERVATIVE FREE 10 ML: 5 INJECTION INTRAVENOUS at 00:13

## 2025-01-28 RX ADMIN — PROPOFOL 30 MG: 10 INJECTION, EMULSION INTRAVENOUS at 13:36

## 2025-01-28 RX ADMIN — SODIUM CHLORIDE, PRESERVATIVE FREE 10 ML: 5 INJECTION INTRAVENOUS at 20:33

## 2025-01-28 RX ADMIN — MORPHINE SULFATE 2 MG: 2 INJECTION, SOLUTION INTRAMUSCULAR; INTRAVENOUS at 06:13

## 2025-01-28 RX ADMIN — PROPOFOL 30 MG: 10 INJECTION, EMULSION INTRAVENOUS at 13:30

## 2025-01-28 RX ADMIN — SODIUM CHLORIDE: 9 INJECTION, SOLUTION INTRAVENOUS at 06:02

## 2025-01-28 RX ADMIN — SODIUM CHLORIDE, PRESERVATIVE FREE 10 ML: 5 INJECTION INTRAVENOUS at 09:27

## 2025-01-28 RX ADMIN — POTASSIUM BICARBONATE 40 MEQ: 782 TABLET, EFFERVESCENT ORAL at 00:12

## 2025-01-28 RX ADMIN — MORPHINE SULFATE 2 MG: 2 INJECTION, SOLUTION INTRAMUSCULAR; INTRAVENOUS at 20:57

## 2025-01-28 RX ADMIN — SODIUM CHLORIDE, PRESERVATIVE FREE 10 ML: 5 INJECTION INTRAVENOUS at 20:57

## 2025-01-28 RX ADMIN — PROPOFOL 30 MG: 10 INJECTION, EMULSION INTRAVENOUS at 13:32

## 2025-01-28 RX ADMIN — SODIUM CHLORIDE 8 MG/HR: 9 INJECTION, SOLUTION INTRAVENOUS at 10:13

## 2025-01-28 RX ADMIN — SODIUM CHLORIDE 8 MG/HR: 9 INJECTION, SOLUTION INTRAVENOUS at 18:58

## 2025-01-28 RX ADMIN — SODIUM CHLORIDE: 9 INJECTION, SOLUTION INTRAVENOUS at 18:49

## 2025-01-28 ASSESSMENT — PAIN DESCRIPTION - FREQUENCY
FREQUENCY: CONTINUOUS

## 2025-01-28 ASSESSMENT — PAIN SCALES - GENERAL
PAINLEVEL_OUTOF10: 7
PAINLEVEL_OUTOF10: 7
PAINLEVEL_OUTOF10: 0
PAINLEVEL_OUTOF10: 0
PAINLEVEL_OUTOF10: 5
PAINLEVEL_OUTOF10: 3
PAINLEVEL_OUTOF10: 6
PAINLEVEL_OUTOF10: 6

## 2025-01-28 ASSESSMENT — PAIN DESCRIPTION - ONSET
ONSET: ON-GOING

## 2025-01-28 ASSESSMENT — PAIN DESCRIPTION - ORIENTATION
ORIENTATION: LEFT
ORIENTATION: LEFT
ORIENTATION: ANTERIOR
ORIENTATION: LEFT

## 2025-01-28 ASSESSMENT — PAIN - FUNCTIONAL ASSESSMENT
PAIN_FUNCTIONAL_ASSESSMENT: PREVENTS OR INTERFERES WITH MANY ACTIVE NOT PASSIVE ACTIVITIES
PAIN_FUNCTIONAL_ASSESSMENT: ACTIVITIES ARE NOT PREVENTED
PAIN_FUNCTIONAL_ASSESSMENT: ACTIVITIES ARE NOT PREVENTED

## 2025-01-28 ASSESSMENT — PAIN DESCRIPTION - PAIN TYPE
TYPE: ACUTE PAIN

## 2025-01-28 ASSESSMENT — PAIN DESCRIPTION - DESCRIPTORS
DESCRIPTORS: ACHING;SORE
DESCRIPTORS: CRAMPING
DESCRIPTORS: ACHING;DISCOMFORT;SHARP;TENDER
DESCRIPTORS: ACHING;SORE

## 2025-01-28 ASSESSMENT — ENCOUNTER SYMPTOMS: SHORTNESS OF BREATH: 0

## 2025-01-28 ASSESSMENT — PAIN DESCRIPTION - LOCATION
LOCATION: ABDOMEN

## 2025-01-28 NOTE — CARE COORDINATION
Case Management Assessment  Initial Evaluation    Date/Time of Evaluation: 1/28/2025 11:20 AM  Assessment Completed by: Francoise Ramires    If patient is discharged prior to next notation, then this note serves as note for discharge by case management.    Patient Name: Christiano Rider                   YOB: 1970  Diagnosis: Acute upper GI bleed [K92.2]                   Date / Time: 1/27/2025  5:04 PM    Patient Admission Status: Inpatient   Readmission Risk (Low < 19, Mod (19-27), High > 27): Readmission Risk Score: 9    Current PCP: Elan Shields MD  PCP verified by CM? (P) Yes    Chart Reviewed: Yes      History Provided by: (P) Patient  Patient Orientation: (P) Alert and Oriented    Patient Cognition: (P) Alert    Hospitalization in the last 30 days (Readmission):  No    If yes, Readmission Assessment in CM Navigator will be completed.    Advance Directives:      Code Status: Full Code   Patient's Primary Decision Maker is: (P) Legal Next of Kin      Discharge Planning:    Patient lives with: (P) Spouse/Significant Other, Children Type of Home: (P) House  Primary Care Giver: (P) Self  Patient Support Systems include: (P) Spouse/Significant Other, Children   Current Financial resources: (P) Other (Comment) ([place] [employer])  Current community resources:    Current services prior to admission: (P) None            Current DME:              Type of Home Care services:  (P) None    ADLS  Prior functional level: (P) Independent in ADLs/IADLs  Current functional level: (P) Independent in ADLs/IADLs    PT AM-PAC:   /24  OT AM-PAC:   /24    Family can provide assistance at DC: (P) Yes  Would you like Case Management to discuss the discharge plan with any other family members/significant others, and if so, who? (P) No  Plans to Return to Present Housing: (P) Yes  Other Identified Issues/Barriers to RETURNING to current housing: medical complications  Potential Assistance needed at discharge: N/A

## 2025-01-28 NOTE — ANESTHESIA PRE PROCEDURE
\"[id number]\", \"BEART\", \"[id number]\"     Type & Screen (If Applicable):  Lab Results   Component Value Date    ABORH O POSITIVE 01/28/2025    LABANTI NEGATIVE 01/28/2025       Drug/Infectious Status (If Applicable):  Lab Results   Component Value Date/Time    HEPCAB NONREACTIVE 09/05/2024 07:25 AM       COVID-19 Screening (If Applicable):   Lab Results   Component Value Date/Time    COVID19 Not Detected 03/18/2021 12:40 PM           Anesthesia Evaluation    Airway: Mallampati: I  TM distance: >3 FB   Neck ROM: full  Mouth opening: > = 3 FB   Dental:          Pulmonary:   (+)           asthma:     (-) shortness of breath                           Cardiovascular:        (-)  angina                Neuro/Psych:               GI/Hepatic/Renal:             Endo/Other:                     Abdominal:             Vascular:          Other Findings:             Anesthesia Plan      MAC     ASA 2                                   Emma Carter MD   1/28/2025

## 2025-01-28 NOTE — PROGRESS NOTES
[facility] - Brief GI attending note  Patient:   Christiano Rider   :    1970   Med Rec#:                 [id number]   Date:     2025  Consultant:   JOSIE CARUSO MD        SUBJECTIVE:     Reviewed notes, including Sawyer Banuelos's H and P, from this admission.  Also reviewed previous endoscopy reports.  Melenic stools started yesterday morning, but patient has not had a bowel movement since last night.  Has been using ibuprofen \" a few times\" during the past month.  In the outpatient setting, he takes his Nexium (20 mg twice daily) \"religiously.\"      CURRENT MEDICATIONS:  .  Scheduled Meds:   sodium chloride flush  5-40 mL IntraVENous 2 times per day     .  Continuous Infusions:   sodium chloride 150 mL/hr at 25 0602    sodium chloride      pantoprazole (PROTONIX) 80 mg in sodium chloride 0.9 % 100 mL infusion 8 mg/hr (25 1013)     .  PRN Meds:sodium chloride flush, sodium chloride, ondansetron **OR** ondansetron, acetaminophen **OR** acetaminophen, morphine, potassium chloride **OR** potassium alternative oral replacement **OR** potassium chloride, magnesium sulfate  .      PHYSICAL EXAM:   .    Temp (24hrs), Av °F (36.7 °C), Min:97.7 °F (36.5 °C), Max:98.2 °F (36.8 °C)    /68   Pulse 87   Temp 98.2 °F (36.8 °C) (Oral)   Resp 17   Ht 1.88 m (6' 2\")   Wt 82.6 kg (182 lb 1 oz)   SpO2 97%   BMI 23.38 kg/m²    .  General:    Alert, NAD     Lungs:   CTA bilaterally    Heart:   RRR  Abdomen:   Soft, nontender, ND, bowel sounds normal, no masses  Extremities:   No clubbing, No cyanosis, No edema  Skin:    No jaundice       LABS and IMAGING:     CBC  Recent Labs     25  1823 25  0214 25  0733   WBC 11.6*  --   --    HGB 11.1* 8.8* 8.8*   MCV 86.6  --   --    RDW 13.7  --   --      --   --        ANEMIA STUDIES  Recent Labs     25  0733   TIBC 286   FERRITIN 15   [id number] 244   FOLATE 8.7       BMP  Recent Labs     25  1823

## 2025-01-28 NOTE — PLAN OF CARE
Problem: Gastrointestinal - Adult  Goal: Maintains adequate nutritional intake  1/28/2025 1125 by Izzy Berry RN  Outcome: Not Progressing  1/28/2025 0255 by May Smith RN  Outcome: Progressing     Problem: Discharge Planning  Goal: Discharge to home or other facility with appropriate resources  1/28/2025 1125 by Izzy Berry RN  Outcome: Progressing  1/28/2025 0255 by May Smith RN  Outcome: Progressing  Flowsheets  Taken 1/27/2025 2216  Discharge to home or other facility with appropriate resources:   Identify barriers to discharge with patient and caregiver   Identify discharge learning needs (meds, wound care, etc)   Refer to discharge planning if patient needs post-hospital services based on physician order or complex needs related to functional status, cognitive ability or social support system  Taken 1/27/2025 2207  Discharge to home or other facility with appropriate resources:   Identify barriers to discharge with patient and caregiver   Arrange for needed discharge resources and transportation as appropriate     Problem: Gastrointestinal - Adult  Goal: Minimal or absence of nausea and vomiting  1/28/2025 1125 by Izzy Berry RN  Outcome: Progressing  1/28/2025 0255 by May Smith RN  Outcome: Progressing  Goal: Maintains or returns to baseline bowel function  1/28/2025 1125 by Izzy Berry RN  Outcome: Progressing  1/28/2025 0255 by May Smith RN  Outcome: Progressing     Problem: Genitourinary - Adult  Goal: Absence of urinary retention  1/28/2025 1125 by Izzy Berry RN  Outcome: Progressing  1/28/2025 0255 by May Smith RN  Outcome: Progressing  Goal: Urinary catheter remains patent  1/28/2025 0255 by May Smith RN  Outcome: Progressing     Problem: Gastrointestinal - Adult  Goal: Maintains adequate nutritional intake  1/28/2025 1125 by Izzy Berry RN  Outcome: Not

## 2025-01-28 NOTE — CONSENT
Informed Consent for Blood Component Transfusion Note    I have discussed with the patient the rationale for blood component transfusion; its benefits in treating or preventing fatigue, organ damage, or death; and its risk which includes mild transfusion reactions, rare risk of blood borne infection, or more serious but rare reactions. I have discussed the alternatives to transfusion, including the risk and consequences of not receiving transfusion. The patient had an opportunity to ask questions and had agreed to proceed with transfusion of blood components.    Electronically signed by STONE Kulkarni CNP on 1/27/25 at 11:11 PM EST

## 2025-01-28 NOTE — H&P
[facility]  Office: [phone]  Herminio Arreguin DO, Luigi Ayers DO, Ashwin Patton DO, Fabiano Mckoy DO, Dereje Condon MD, Neyda Jessica MD, Reggie Ware MD, Briseyda Shelley MD,  Jorge L Alfaro MD, Elizabeth Narayan MD, Wesley Guzman MD,  Td Garay DO, Sallie King MD, Dyllan Espitia MD, Steve Arreguin DO, Kim Millan MD,  Enoch Dwyer DO, Ambar De Leon MD, Loulou York MD, Tati Fried MD, Cintia Arriaga MD,  Mino Zambrano MD, Donovan Beavers MD, Rima Comer MD, Usha Morales MD, Mac Thornton MD, Mira Amaya MD, Jose Lopez DO, Ed Godfrey DO, Mikael Caceres MD,  Juan Alberto Tobar MD, Shirley Waterhouse, CNP,  Ariane Bunch, CNP, Victor Hugo Giron, CNP,  Opal Douglass, DNP, Tosin Clement, CNP, Laurita Howard, CNP, Olga Lidia Arellano CNP, Lori Cooper, CNP, Anjelica Zayas, CNP, Faith Ye, PA-C, Kirsten Carrillo, PA-C, Arin Brian, CNP, Shahnaz Weber, CNP, Mari Malik, CNP, Ursula Lin, CNS, Ally Hoffmann, CNP, Paula Gaston, CNP, Tracy Schwab, CNP         [facility]   IN-PATIENT SERVICE   [facility]    HISTORY AND PHYSICAL EXAMINATION            Date:   1/27/2025  Patient name:  Christiano Rider  Date of admission:  1/27/2025  5:04 PM  MRN:   [id number]  Account:  [id number]  YOB: 1970  PCP:    Elan Shields MD  Room:   [facility]  Code Status:    Full Code    Chief Complaint:     Chief Complaint   Patient presents with    Abdominal Pain     Pt states on new med and stool has been different, and having abdominal pain       History Obtained From:     patient, electronic medical record    History of Present Illness:     Christiano Rider is a 54 y.o. Non- / non  male who presents with Abdominal Pain (Pt states on new med and stool has been different, and having abdominal pain)   and is admitted to the hospital for the management of Acute upper GI bleed.    Patient with a past medical

## 2025-01-28 NOTE — ED NOTES
Pt request to have family transport him to the hospital with the MD permission.  Writer spoke with MD and pt is ok to have ride once the bed is officially assigned and the IV will be removed.  Pt is ok with having iv removed and wait for the bed assignment.

## 2025-01-28 NOTE — PROGRESS NOTES
[facility]  Office: [phone]  Herminio rAreguin DO, Luigi Ayers DO, Ashwin Patton DO, Fabiano Mckoy DO, Dereje Condon MD, Neyda Jessica MD, Reggie Ware MD, Briseyda Shelley MD,  Jorge L Alfaro MD, Elizabeth Narayan MD, Wesley Guzman MD,  Td Garay DO, Sallie King MD, Dyllan Espitia MD, Steve Arreguin DO, Kim Millan MD,  Enoch Dwyer DO, Ambar De Leon MD, Loulou York MD, Tati Fried MD, Cintia Arriaga MD,  Mino Zambrano MD, Donovan Beavers MD, Rima Comer MD, Usha Morales MD, Mac Thornton MD, Mira Amaya MD, Victor Hugo Mota DO, Juan Alberto Tobar MD, Td Pederson MD, Mohsin Reza, MD, Shirley Waterhouse, CNP,  Ariane Bunch, CNP, Victor Hugo Giron, CNP,  Opal Douglass, DNP, Tosin Clement, CNP, Laurita Howard, CNP, Lori Cooper, CNP, Anjelica Zayas, CNP, Faith Ye, PA-C, Kirsten Carrillo, PA-C, Arin Brian, CNP, Sawyer Banuelos, CNP,  Jasmina Herring, CNP, Eleni Austin, CNP, Mari Malik, CNP,  Ursula Lin, CNS, Olga Lidia Arellano, CNP, Paula Gaston, CNP,   Mary Lou Bueno, CNP         [facility]   IN-PATIENT SERVICE   [facility]    Progress Note    1/28/2025    1:45 PM    Name:   Christiano Rider  MRN:     [id number]     Acct:      [id number]   Room:   [facility]  IP Day:  1  Admit Date:  1/27/2025  5:04 PM    PCP:   Elan Shields MD  Code Status:  Full Code    Subjective:     C/C:   Chief Complaint   Patient presents with    Abdominal Pain     Pt states on new med and stool has been different, and having abdominal pain     Interval History Status: not changed.     Patient continues to have mid abdominal pain.  Denies any complaints of chest pain, shortness of breath, nausea or vomiting, fever chills or acute complaints.    Brief History:     This is a 54-year-old male who presents with a complaint of abdominal pain with history of prior esophageal stricture, GERD with asthma.  He reported bloody stools and was found to

## 2025-01-28 NOTE — PLAN OF CARE
Patient came in with Epigastric abdominal pain that had been going on for awhile and steadily getting worse.  He also experienced black tarry stools.  None so far during this shift.  Patient admits to having stomach issues and taking nexium for it.  He is not on blood thinners at this time. He does not complain of \"pain\" more or less uncomfortable cramping at times and mostly at night per patient. Patient's vitals have been stable so far.  His potassium was 3.3.  It was replaced with oral liquid potassium. Patient has a new IV in his left wrist.  No other complaints at this time. Plan of care is ongoing.    Pain level assessment complete.   Patient educated on pain scale and control interventions  PRN pain medication given per patient request  Patient instructed to call out with new onset of pain or unrelieved pain    Problem: Pain  Goal: Verbalizes/displays adequate comfort level or baseline comfort level  Outcome: Progressing     Siderails up x 2  Hourly rounding  Call light in reach  Instructed to call for assist before attempting out of bed.  Remains free from falls and accidental injury at this time   Floor free from obstacles  Bed is locked and in lowest position  Adequate lighting provided  Bed alarm on, Red Falling star and Stay with Me signs posted      Problem: Safety - Adult  Goal: Free from fall injury  Outcome: Progressing     Problem: Genitourinary - Adult  Goal: Absence of urinary retention  Outcome: Progressing  Goal: Urinary catheter remains patent  Outcome: Progressing     Problem: Gastrointestinal - Adult  Goal: Minimal or absence of nausea and vomiting  Outcome: Progressing  Goal: Maintains or returns to baseline bowel function  Outcome: Progressing  Goal: Maintains adequate nutritional intake  Outcome: Progressing     Problem: Discharge Planning  Goal: Discharge to home or other facility with appropriate resources  Outcome: Progressing  Flowsheets  Taken 1/27/2025 8383  Discharge to home or

## 2025-01-28 NOTE — ANESTHESIA POSTPROCEDURE EVALUATION
Department of Anesthesiology  Postprocedure Note    Patient: Christiano Rider  MRN: [id number]  YOB: 1970  Date of evaluation: 1/28/2025    Procedure Summary       Date: 01/28/25 Room / Location: [address]    Anesthesia Start: 1325 Anesthesia Stop: 1351    Procedure: ESOPHAGOGASTRODUODENOSCOPY Diagnosis:       Gastrointestinal hemorrhage, unspecified gastrointestinal hemorrhage type      (Gastrointestinal hemorrhage, unspecified gastrointestinal hemorrhage type [K92.2])    Surgeons: Steve Rojas MD Responsible Provider: Emma Carter MD    Anesthesia Type: MAC ASA Status: 2            Anesthesia Type: No value filed.    Curtis Phase I: Curtis Score: 9    Curtis Phase II:      Anesthesia Post Evaluation    Airway patency: patent  Cardiovascular status: hemodynamically stable  Respiratory status: acceptable    No notable events documented.

## 2025-01-28 NOTE — OP NOTE
[facility] Endoscopy  EGD  Patient: Christiano Rider            Date:   2025  : 1970       Med Rec#: [id number]     PROCEDURE:  EGD  INDICATION: GI bleeding    FINDINGS  Irregular Z-line  A 2 cm hiatal hernia hiatal hernia was noted on retroflexion.   Several benign-appearing fundic gland polyps in the fundus and body of the stomach  Upper GI tract otherwise normal.  No fresh or denatured blood noted  Rectal exam revealed hard dark stool    PLAN  Continue PPI at the current dose  Continue to monitor hemoglobin with transfusions as necessary   Colonoscopy tomorrow      MEDICATIONS:  MAC    ESTIMATED BLOOD LOSS:  none  Specimen(s) Removed: As stated above  COMPLICATIONS: none  Prior to the procedure, the patient's history was reviewed and a directed physical examination was performed.   Informed consent was obtained.  After adequate sedation was achieved, the scope was inserted into the mouth and advanced to the second portion of the duodenum.  As the scope was withdrawn, the anatomy and the appearance of the mucosa was noted.     Munir Rojas M.D.

## 2025-01-28 NOTE — PROGRESS NOTES
Pt admitted to room 2108 from ER  Oriented to room and call light/tv controls.  Bed in lowest position, wheels locked, 2/4 side rails up  Call light in reach, room free of clutter, adequate lighting provided.

## 2025-01-28 NOTE — CONSULTS
GASTROENTEROLOGY CONSULT       REASON FOR CONSULT:  gi bleeding    REQUESTING PHYSICIAN:  Ashwin Patton DO    HISTORY OF PRESENT ILLNESS:    The patient is a 54 y.o. male who presents with melena since Sunday, no BM yet this am. Associated with LUQ abdominal pain which has been going on for some time. No associated weight loss, no vomiting. Patient with hx of heavy nsaid use in remote past, but not lately. Patient takes Nexium BID already at home. Denies dysphagia or odynphagia. Drinks about 2 beers per month. Denies early satiety. Admits to chronic GERD, which is why he is on BID PPI already.   Hgb has dropped about 3 grams from admission. He also has azotemia. He is hemodynamically stable. Currently c/o neck pain due to sleep in wrong position.   Has been NPO for planned EGD this afternoon.   Follows Dr. Pedro BRITTON.     Past endoscopies reviewed within [facility] Care Everywhere.     CT a/p pending.     MEDICAL HISTORY:   Past Medical History:   Diagnosis Date    Asthma     Discogenic syndrome, lumbar     EE (eosinophilic esophagitis) 02/13/2019    Gastritis     GERD (gastroesophageal reflux disease) 1/14/2015    Hematuria     Stress    Hiatal hernia     Male pattern baldness     Mandibular retrognathism     Maxillary hyperplasia     Penile lesion     Prepatellar bursitis     Right Knee    Prostatic congestion     Reactive airway disease     Tension headache        SURGICAL HISTORY:  Past Surgical History:   Procedure Laterality Date    ABDOMEN SURGERY      ANKLE FRACTURE SURGERY      APPENDECTOMY      COLONOSCOPY      COLONOSCOPY N/A 03/22/2021    COLONOSCOPY POLYPECTOMY SNARE/COLD BIOPSY performed by Roxanne Ware MD at [facility]    ELBOW SURGERY      ENDOSCOPY, COLON, DIAGNOSTIC      INGUINAL HERNIA REPAIR Right 12/19/2013    MANDIBLE SURGERY      NOSE SURGERY      UPPER GASTROINTESTINAL ENDOSCOPY      UPPER GASTROINTESTINAL ENDOSCOPY  06/08/2016    hiatal hernia; gastritis    UPPER

## 2025-01-28 NOTE — PROGRESS NOTES
Patient states that he feels drained and the pain that he feels in his abdomen is worse on the left side.

## 2025-01-29 ENCOUNTER — HOSPITAL ENCOUNTER (INPATIENT)
Age: 55
LOS: 9 days | Discharge: HOME | End: 2025-02-07
Attending: HOSPITALIST | Admitting: SURGERY
Payer: COMMERCIAL

## 2025-01-29 ENCOUNTER — ANESTHESIA (OUTPATIENT)
Dept: OPERATING ROOM | Age: 55
DRG: 394 | End: 2025-01-29
Payer: COMMERCIAL

## 2025-01-29 ENCOUNTER — APPOINTMENT (OUTPATIENT)
Dept: INTERVENTIONAL RADIOLOGY/VASCULAR | Age: 55
End: 2025-01-29
Attending: HOSPITALIST
Payer: COMMERCIAL

## 2025-01-29 ENCOUNTER — ANESTHESIA EVENT (OUTPATIENT)
Dept: OPERATING ROOM | Age: 55
DRG: 394 | End: 2025-01-29
Payer: COMMERCIAL

## 2025-01-29 ENCOUNTER — APPOINTMENT (OUTPATIENT)
Dept: CT IMAGING | Age: 55
DRG: 394 | End: 2025-01-29
Payer: COMMERCIAL

## 2025-01-29 VITALS
TEMPERATURE: 97.9 F | RESPIRATION RATE: 12 BRPM | HEART RATE: 112 BPM | WEIGHT: 182.06 LBS | SYSTOLIC BLOOD PRESSURE: 131 MMHG | OXYGEN SATURATION: 97 % | DIASTOLIC BLOOD PRESSURE: 98 MMHG | BODY MASS INDEX: 23.36 KG/M2 | HEIGHT: 74 IN

## 2025-01-29 DIAGNOSIS — K92.2 SMALL INTESTINAL HEMORRHAGE: ICD-10-CM

## 2025-01-29 DIAGNOSIS — C49.A3 GIST (GASTROINTESTINAL STROMAL TUMOR) OF SMALL BOWEL, MALIGNANT (HCC): Primary | ICD-10-CM

## 2025-01-29 DIAGNOSIS — K63.89 SMALL BOWEL MASS: ICD-10-CM

## 2025-01-29 PROBLEM — D62 ACUTE BLOOD LOSS ANEMIA: Status: ACTIVE | Noted: 2025-01-29

## 2025-01-29 LAB
ANION GAP SERPL CALCULATED.3IONS-SCNC: 8 MMOL/L (ref 9–16)
BUN SERPL-MCNC: 16 MG/DL (ref 6–20)
CALCIUM SERPL-MCNC: 7.5 MG/DL (ref 8.6–10.4)
CHLORIDE SERPL-SCNC: 107 MMOL/L (ref 98–107)
CO2 SERPL-SCNC: 23 MMOL/L (ref 20–31)
CREAT SERPL-MCNC: 0.7 MG/DL (ref 0.7–1.2)
ERYTHROCYTE [DISTWIDTH] IN BLOOD BY AUTOMATED COUNT: 13.1 % (ref 11.8–14.4)
GFR, ESTIMATED: >90 ML/MIN/1.73M2
GLUCOSE SERPL-MCNC: 112 MG/DL (ref 74–99)
HCT VFR BLD AUTO: 19.5 % (ref 40.7–50.3)
HCT VFR BLD AUTO: 21.7 % (ref 40.7–50.3)
HCT VFR BLD AUTO: 23.9 % (ref 40.7–50.3)
HCT VFR BLD AUTO: 24 % (ref 40.7–50.3)
HGB BLD-MCNC: 7 G/DL (ref 13–17)
HGB BLD-MCNC: 7.9 G/DL (ref 13–17)
HGB BLD-MCNC: 8.3 G/DL (ref 13–17)
HGB BLD-MCNC: 8.4 G/DL (ref 13–17)
MCH RBC QN AUTO: 31.2 PG (ref 25.2–33.5)
MCHC RBC AUTO-ENTMCNC: 35.1 G/DL (ref 28.4–34.8)
MCV RBC AUTO: 88.8 FL (ref 82.6–102.9)
NRBC BLD-RTO: 0 PER 100 WBC
PLATELET # BLD AUTO: 174 K/UL (ref 138–453)
PMV BLD AUTO: 10.2 FL (ref 8.1–13.5)
POTASSIUM SERPL-SCNC: 4 MMOL/L (ref 3.7–5.3)
RBC # BLD AUTO: 2.69 M/UL (ref 4.21–5.77)
SODIUM SERPL-SCNC: 138 MMOL/L (ref 136–145)
WBC OTHER # BLD: 8.9 K/UL (ref 3.5–11.3)

## 2025-01-29 PROCEDURE — 6360000002 HC RX W HCPCS: Performed by: NURSE ANESTHETIST, CERTIFIED REGISTERED

## 2025-01-29 PROCEDURE — 2580000003 HC RX 258: Performed by: INTERNAL MEDICINE

## 2025-01-29 PROCEDURE — 3700000001 HC ADD 15 MINUTES (ANESTHESIA): Performed by: INTERNAL MEDICINE

## 2025-01-29 PROCEDURE — 85018 HEMOGLOBIN: CPT

## 2025-01-29 PROCEDURE — 74174 CTA ABD&PLVS W/CONTRAST: CPT

## 2025-01-29 PROCEDURE — B4101ZZ FLUOROSCOPY OF ABDOMINAL AORTA USING LOW OSMOLAR CONTRAST: ICD-10-PCS | Performed by: RADIOLOGY

## 2025-01-29 PROCEDURE — 75726 ARTERY X-RAYS ABDOMEN: CPT

## 2025-01-29 PROCEDURE — 2709999900 HC NON-CHARGEABLE SUPPLY: Performed by: INTERNAL MEDICINE

## 2025-01-29 PROCEDURE — 36415 COLL VENOUS BLD VENIPUNCTURE: CPT

## 2025-01-29 PROCEDURE — 0DJ08ZZ INSPECTION OF UPPER INTESTINAL TRACT, VIA NATURAL OR ARTIFICIAL OPENING ENDOSCOPIC: ICD-10-PCS | Performed by: INTERNAL MEDICINE

## 2025-01-29 PROCEDURE — 99152 MOD SED SAME PHYS/QHP 5/>YRS: CPT

## 2025-01-29 PROCEDURE — 7100000000 HC PACU RECOVERY - FIRST 15 MIN: Performed by: INTERNAL MEDICINE

## 2025-01-29 PROCEDURE — 6360000002 HC RX W HCPCS: Performed by: INTERNAL MEDICINE

## 2025-01-29 PROCEDURE — 2580000003 HC RX 258: Performed by: NURSE ANESTHETIST, CERTIFIED REGISTERED

## 2025-01-29 PROCEDURE — 75605 CONTRAST EXAM THORACIC AORTA: CPT

## 2025-01-29 PROCEDURE — 3609017100 HC EGD: Performed by: INTERNAL MEDICINE

## 2025-01-29 PROCEDURE — 6360000004 HC RX CONTRAST MEDICATION: Performed by: INTERNAL MEDICINE

## 2025-01-29 PROCEDURE — P9040 RBC LEUKOREDUCED IRRADIATED: HCPCS

## 2025-01-29 PROCEDURE — 3700000000 HC ANESTHESIA ATTENDED CARE: Performed by: INTERNAL MEDICINE

## 2025-01-29 PROCEDURE — 36430 TRANSFUSION BLD/BLD COMPNT: CPT

## 2025-01-29 PROCEDURE — 75774 ARTERY X-RAY EACH VESSEL: CPT

## 2025-01-29 PROCEDURE — B41C1ZZ FLUOROSCOPY OF PELVIC ARTERIES USING LOW OSMOLAR CONTRAST: ICD-10-PCS | Performed by: RADIOLOGY

## 2025-01-29 PROCEDURE — 85014 HEMATOCRIT: CPT

## 2025-01-29 PROCEDURE — 99221 1ST HOSP IP/OBS SF/LOW 40: CPT | Performed by: SURGERY

## 2025-01-29 PROCEDURE — 2500000003 HC RX 250 WO HCPCS: Performed by: INTERNAL MEDICINE

## 2025-01-29 PROCEDURE — 36247 INS CATH ABD/L-EXT ART 3RD: CPT

## 2025-01-29 PROCEDURE — 76937 US GUIDE VASCULAR ACCESS: CPT

## 2025-01-29 PROCEDURE — 99239 HOSP IP/OBS DSCHRG MGMT >30: CPT | Performed by: INTERNAL MEDICINE

## 2025-01-29 PROCEDURE — 30233N1 TRANSFUSION OF NONAUTOLOGOUS RED BLOOD CELLS INTO PERIPHERAL VEIN, PERCUTANEOUS APPROACH: ICD-10-PCS | Performed by: INTERNAL MEDICINE

## 2025-01-29 PROCEDURE — 36248 INS CATH ABD/L-EXT ART ADDL: CPT

## 2025-01-29 PROCEDURE — 2000000000 HC ICU R&B

## 2025-01-29 PROCEDURE — 80048 BASIC METABOLIC PNL TOTAL CA: CPT

## 2025-01-29 PROCEDURE — 7100000001 HC PACU RECOVERY - ADDTL 15 MIN: Performed by: INTERNAL MEDICINE

## 2025-01-29 PROCEDURE — 0DJD8ZZ INSPECTION OF LOWER INTESTINAL TRACT, VIA NATURAL OR ARTIFICIAL OPENING ENDOSCOPIC: ICD-10-PCS | Performed by: INTERNAL MEDICINE

## 2025-01-29 PROCEDURE — 6360000002 HC RX W HCPCS: Performed by: STUDENT IN AN ORGANIZED HEALTH CARE EDUCATION/TRAINING PROGRAM

## 2025-01-29 PROCEDURE — 6360000004 HC RX CONTRAST MEDICATION: Performed by: HOSPITALIST

## 2025-01-29 PROCEDURE — 75710 ARTERY X-RAYS ARM/LEG: CPT

## 2025-01-29 PROCEDURE — P9016 RBC LEUKOCYTES REDUCED: HCPCS

## 2025-01-29 PROCEDURE — 85027 COMPLETE CBC AUTOMATED: CPT

## 2025-01-29 PROCEDURE — C1894 INTRO/SHEATH, NON-LASER: HCPCS

## 2025-01-29 PROCEDURE — 3609027000 HC COLONOSCOPY: Performed by: INTERNAL MEDICINE

## 2025-01-29 PROCEDURE — B4141ZZ FLUOROSCOPY OF SUPERIOR MESENTERIC ARTERY USING LOW OSMOLAR CONTRAST: ICD-10-PCS | Performed by: RADIOLOGY

## 2025-01-29 PROCEDURE — 6360000002 HC RX W HCPCS: Performed by: RADIOLOGY

## 2025-01-29 PROCEDURE — 99153 MOD SED SAME PHYS/QHP EA: CPT

## 2025-01-29 RX ORDER — FENTANYL CITRATE 50 UG/ML
25 INJECTION, SOLUTION INTRAMUSCULAR; INTRAVENOUS
Status: DISCONTINUED | OUTPATIENT
Start: 2025-01-29 | End: 2025-01-30

## 2025-01-29 RX ORDER — FENTANYL CITRATE 50 UG/ML
INJECTION, SOLUTION INTRAMUSCULAR; INTRAVENOUS PRN
Status: COMPLETED | OUTPATIENT
Start: 2025-01-29 | End: 2025-01-29

## 2025-01-29 RX ORDER — SODIUM CHLORIDE 0.9 % (FLUSH) 0.9 %
5-40 SYRINGE (ML) INJECTION EVERY 12 HOURS SCHEDULED
Status: DISCONTINUED | OUTPATIENT
Start: 2025-01-29 | End: 2025-02-07 | Stop reason: HOSPADM

## 2025-01-29 RX ORDER — ACETAMINOPHEN 325 MG/1
650 TABLET ORAL EVERY 6 HOURS PRN
Status: DISCONTINUED | OUTPATIENT
Start: 2025-01-29 | End: 2025-01-30

## 2025-01-29 RX ORDER — MIDAZOLAM HYDROCHLORIDE 1 MG/ML
INJECTION, SOLUTION INTRAMUSCULAR; INTRAVENOUS
Status: DISCONTINUED | OUTPATIENT
Start: 2025-01-29 | End: 2025-01-29 | Stop reason: SDUPTHER

## 2025-01-29 RX ORDER — SODIUM CHLORIDE, SODIUM LACTATE, POTASSIUM CHLORIDE, CALCIUM CHLORIDE 600; 310; 30; 20 MG/100ML; MG/100ML; MG/100ML; MG/100ML
INJECTION, SOLUTION INTRAVENOUS
Status: DISCONTINUED | OUTPATIENT
Start: 2025-01-29 | End: 2025-01-29 | Stop reason: SDUPTHER

## 2025-01-29 RX ORDER — ONDANSETRON 4 MG/1
4 TABLET, ORALLY DISINTEGRATING ORAL EVERY 8 HOURS PRN
Status: CANCELLED | OUTPATIENT
Start: 2025-01-29

## 2025-01-29 RX ORDER — SODIUM CHLORIDE 9 MG/ML
INJECTION, SOLUTION INTRAVENOUS PRN
Status: CANCELLED | OUTPATIENT
Start: 2025-01-29

## 2025-01-29 RX ORDER — SODIUM CHLORIDE 0.9 % (FLUSH) 0.9 %
5-40 SYRINGE (ML) INJECTION PRN
Status: CANCELLED | OUTPATIENT
Start: 2025-01-29

## 2025-01-29 RX ORDER — MAGNESIUM SULFATE IN WATER 40 MG/ML
2000 INJECTION, SOLUTION INTRAVENOUS PRN
Status: CANCELLED | OUTPATIENT
Start: 2025-01-29

## 2025-01-29 RX ORDER — POTASSIUM CHLORIDE 7.45 MG/ML
10 INJECTION INTRAVENOUS PRN
Status: CANCELLED | OUTPATIENT
Start: 2025-01-29 | End: 2025-02-01

## 2025-01-29 RX ORDER — ACETAMINOPHEN 650 MG/1
650 SUPPOSITORY RECTAL EVERY 6 HOURS PRN
Status: DISCONTINUED | OUTPATIENT
Start: 2025-01-29 | End: 2025-01-30

## 2025-01-29 RX ORDER — ACETAMINOPHEN 325 MG/1
650 TABLET ORAL EVERY 6 HOURS PRN
Status: CANCELLED | OUTPATIENT
Start: 2025-01-29

## 2025-01-29 RX ORDER — SODIUM CHLORIDE 9 MG/ML
INJECTION, SOLUTION INTRAVENOUS CONTINUOUS
Status: DISCONTINUED | OUTPATIENT
Start: 2025-01-29 | End: 2025-01-30

## 2025-01-29 RX ORDER — ONDANSETRON 2 MG/ML
4 INJECTION INTRAMUSCULAR; INTRAVENOUS EVERY 6 HOURS PRN
Status: DISCONTINUED | OUTPATIENT
Start: 2025-01-29 | End: 2025-02-03

## 2025-01-29 RX ORDER — ONDANSETRON 2 MG/ML
4 INJECTION INTRAMUSCULAR; INTRAVENOUS EVERY 6 HOURS PRN
Status: CANCELLED | OUTPATIENT
Start: 2025-01-29

## 2025-01-29 RX ORDER — MIDAZOLAM HYDROCHLORIDE 2 MG/2ML
INJECTION, SOLUTION INTRAMUSCULAR; INTRAVENOUS PRN
Status: COMPLETED | OUTPATIENT
Start: 2025-01-29 | End: 2025-01-29

## 2025-01-29 RX ORDER — MORPHINE SULFATE 2 MG/ML
2 INJECTION, SOLUTION INTRAMUSCULAR; INTRAVENOUS EVERY 4 HOURS PRN
Status: CANCELLED | OUTPATIENT
Start: 2025-01-29

## 2025-01-29 RX ORDER — LIDOCAINE HYDROCHLORIDE 20 MG/ML
INJECTION, SOLUTION EPIDURAL; INFILTRATION; INTRACAUDAL; PERINEURAL
Status: DISCONTINUED | OUTPATIENT
Start: 2025-01-29 | End: 2025-01-29 | Stop reason: SDUPTHER

## 2025-01-29 RX ORDER — SODIUM CHLORIDE 9 MG/ML
INJECTION, SOLUTION INTRAVENOUS PRN
Status: DISCONTINUED | OUTPATIENT
Start: 2025-01-29 | End: 2025-01-29

## 2025-01-29 RX ORDER — POTASSIUM CHLORIDE 1500 MG/1
40 TABLET, EXTENDED RELEASE ORAL PRN
Status: CANCELLED | OUTPATIENT
Start: 2025-01-29 | End: 2025-02-01

## 2025-01-29 RX ORDER — SODIUM CHLORIDE 9 MG/ML
INJECTION, SOLUTION INTRAVENOUS PRN
Status: DISCONTINUED | OUTPATIENT
Start: 2025-01-29 | End: 2025-01-29 | Stop reason: HOSPADM

## 2025-01-29 RX ORDER — SODIUM CHLORIDE 9 MG/ML
INJECTION, SOLUTION INTRAVENOUS PRN
Status: DISCONTINUED | OUTPATIENT
Start: 2025-01-29 | End: 2025-02-07 | Stop reason: HOSPADM

## 2025-01-29 RX ORDER — MORPHINE SULFATE 2 MG/ML
2 INJECTION, SOLUTION INTRAMUSCULAR; INTRAVENOUS EVERY 4 HOURS PRN
Status: DISCONTINUED | OUTPATIENT
Start: 2025-01-29 | End: 2025-01-29

## 2025-01-29 RX ORDER — IOPAMIDOL 755 MG/ML
100 INJECTION, SOLUTION INTRAVASCULAR
Status: COMPLETED | OUTPATIENT
Start: 2025-01-29 | End: 2025-01-29

## 2025-01-29 RX ORDER — 0.9 % SODIUM CHLORIDE 0.9 %
80 INTRAVENOUS SOLUTION INTRAVENOUS ONCE
Status: COMPLETED | OUTPATIENT
Start: 2025-01-29 | End: 2025-01-29

## 2025-01-29 RX ORDER — POTASSIUM CHLORIDE 7.45 MG/ML
10 INJECTION INTRAVENOUS PRN
Status: DISCONTINUED | OUTPATIENT
Start: 2025-01-29 | End: 2025-01-30

## 2025-01-29 RX ORDER — SODIUM CHLORIDE 0.9 % (FLUSH) 0.9 %
5-40 SYRINGE (ML) INJECTION EVERY 12 HOURS SCHEDULED
Status: CANCELLED | OUTPATIENT
Start: 2025-01-29

## 2025-01-29 RX ORDER — MAGNESIUM SULFATE IN WATER 40 MG/ML
2000 INJECTION, SOLUTION INTRAVENOUS PRN
Status: DISCONTINUED | OUTPATIENT
Start: 2025-01-29 | End: 2025-01-30

## 2025-01-29 RX ORDER — PROPOFOL 10 MG/ML
INJECTION, EMULSION INTRAVENOUS
Status: DISCONTINUED | OUTPATIENT
Start: 2025-01-29 | End: 2025-01-29 | Stop reason: SDUPTHER

## 2025-01-29 RX ORDER — POTASSIUM CHLORIDE 1500 MG/1
40 TABLET, EXTENDED RELEASE ORAL PRN
Status: DISCONTINUED | OUTPATIENT
Start: 2025-01-29 | End: 2025-01-30

## 2025-01-29 RX ORDER — ONDANSETRON 2 MG/ML
INJECTION INTRAMUSCULAR; INTRAVENOUS PRN
Status: COMPLETED | OUTPATIENT
Start: 2025-01-29 | End: 2025-01-29

## 2025-01-29 RX ORDER — ONDANSETRON 4 MG/1
4 TABLET, ORALLY DISINTEGRATING ORAL EVERY 8 HOURS PRN
Status: DISCONTINUED | OUTPATIENT
Start: 2025-01-29 | End: 2025-02-03

## 2025-01-29 RX ORDER — SODIUM CHLORIDE 9 MG/ML
INJECTION, SOLUTION INTRAVENOUS CONTINUOUS
Status: CANCELLED | OUTPATIENT
Start: 2025-01-29

## 2025-01-29 RX ORDER — ACETAMINOPHEN 650 MG/1
650 SUPPOSITORY RECTAL EVERY 6 HOURS PRN
Status: CANCELLED | OUTPATIENT
Start: 2025-01-29

## 2025-01-29 RX ORDER — IOPAMIDOL 755 MG/ML
210 INJECTION, SOLUTION INTRAVASCULAR
Status: COMPLETED | OUTPATIENT
Start: 2025-01-29 | End: 2025-01-29

## 2025-01-29 RX ORDER — SODIUM CHLORIDE 0.9 % (FLUSH) 0.9 %
5-40 SYRINGE (ML) INJECTION PRN
Status: DISCONTINUED | OUTPATIENT
Start: 2025-01-29 | End: 2025-02-07 | Stop reason: HOSPADM

## 2025-01-29 RX ORDER — SODIUM CHLORIDE 0.9 % (FLUSH) 0.9 %
10 SYRINGE (ML) INJECTION ONCE
Status: COMPLETED | OUTPATIENT
Start: 2025-01-29 | End: 2025-01-29

## 2025-01-29 RX ADMIN — PROPOFOL 50 MG: 10 INJECTION, EMULSION INTRAVENOUS at 11:52

## 2025-01-29 RX ADMIN — FENTANYL CITRATE 50 MCG: 50 INJECTION, SOLUTION INTRAMUSCULAR; INTRAVENOUS at 20:03

## 2025-01-29 RX ADMIN — IOPAMIDOL 100 ML: 755 INJECTION, SOLUTION INTRAVENOUS at 15:34

## 2025-01-29 RX ADMIN — PHENYLEPHRINE HYDROCHLORIDE 100 MCG: 10 INJECTION INTRAVENOUS at 12:01

## 2025-01-29 RX ADMIN — PHENYLEPHRINE HYDROCHLORIDE 200 MCG: 10 INJECTION INTRAVENOUS at 12:23

## 2025-01-29 RX ADMIN — SODIUM CHLORIDE, POTASSIUM CHLORIDE, SODIUM LACTATE AND CALCIUM CHLORIDE: 600; 310; 30; 20 INJECTION, SOLUTION INTRAVENOUS at 11:47

## 2025-01-29 RX ADMIN — MIDAZOLAM 2 MG: 1 INJECTION INTRAMUSCULAR; INTRAVENOUS at 11:47

## 2025-01-29 RX ADMIN — PROPOFOL 150 MCG/KG/MIN: 10 INJECTION, EMULSION INTRAVENOUS at 11:51

## 2025-01-29 RX ADMIN — FENTANYL CITRATE 25 MCG: 50 INJECTION, SOLUTION INTRAMUSCULAR; INTRAVENOUS at 23:56

## 2025-01-29 RX ADMIN — MIDAZOLAM HYDROCHLORIDE 1 MG: 1 INJECTION, SOLUTION INTRAMUSCULAR; INTRAVENOUS at 20:40

## 2025-01-29 RX ADMIN — ONDANSETRON 4 MG: 2 INJECTION INTRAMUSCULAR; INTRAVENOUS at 21:13

## 2025-01-29 RX ADMIN — SODIUM CHLORIDE: 9 INJECTION, SOLUTION INTRAVENOUS at 22:08

## 2025-01-29 RX ADMIN — FENTANYL CITRATE 50 MCG: 50 INJECTION, SOLUTION INTRAMUSCULAR; INTRAVENOUS at 20:40

## 2025-01-29 RX ADMIN — PHENYLEPHRINE HYDROCHLORIDE 100 MCG: 10 INJECTION INTRAVENOUS at 12:18

## 2025-01-29 RX ADMIN — PHENYLEPHRINE HYDROCHLORIDE 100 MCG: 10 INJECTION INTRAVENOUS at 12:15

## 2025-01-29 RX ADMIN — MORPHINE SULFATE 2 MG: 2 INJECTION, SOLUTION INTRAMUSCULAR; INTRAVENOUS at 14:08

## 2025-01-29 RX ADMIN — MORPHINE SULFATE 2 MG: 2 INJECTION, SOLUTION INTRAMUSCULAR; INTRAVENOUS at 09:26

## 2025-01-29 RX ADMIN — SODIUM CHLORIDE: 9 INJECTION, SOLUTION INTRAVENOUS at 13:53

## 2025-01-29 RX ADMIN — FENTANYL CITRATE 50 MCG: 50 INJECTION, SOLUTION INTRAMUSCULAR; INTRAVENOUS at 21:10

## 2025-01-29 RX ADMIN — SODIUM CHLORIDE: 9 INJECTION, SOLUTION INTRAVENOUS at 00:39

## 2025-01-29 RX ADMIN — SODIUM CHLORIDE 80 ML: 9 INJECTION, SOLUTION INTRAVENOUS at 15:34

## 2025-01-29 RX ADMIN — SODIUM CHLORIDE 8 MG/HR: 9 INJECTION, SOLUTION INTRAVENOUS at 22:44

## 2025-01-29 RX ADMIN — SODIUM CHLORIDE: 0.9 INJECTION, SOLUTION INTRAVENOUS at 14:27

## 2025-01-29 RX ADMIN — SODIUM CHLORIDE, PRESERVATIVE FREE 10 ML: 5 INJECTION INTRAVENOUS at 15:34

## 2025-01-29 RX ADMIN — MIDAZOLAM HYDROCHLORIDE 1 MG: 1 INJECTION, SOLUTION INTRAMUSCULAR; INTRAVENOUS at 19:27

## 2025-01-29 RX ADMIN — LIDOCAINE HYDROCHLORIDE 100 MG: 20 INJECTION, SOLUTION EPIDURAL; INFILTRATION; INTRACAUDAL; PERINEURAL at 11:51

## 2025-01-29 RX ADMIN — IOPAMIDOL 210 ML: 755 INJECTION, SOLUTION INTRAVENOUS at 21:05

## 2025-01-29 RX ADMIN — SODIUM CHLORIDE 8 MG/HR: 9 INJECTION, SOLUTION INTRAVENOUS at 03:24

## 2025-01-29 RX ADMIN — MIDAZOLAM HYDROCHLORIDE 1 MG: 1 INJECTION, SOLUTION INTRAMUSCULAR; INTRAVENOUS at 20:03

## 2025-01-29 RX ADMIN — FENTANYL CITRATE 50 MCG: 50 INJECTION, SOLUTION INTRAMUSCULAR; INTRAVENOUS at 19:27

## 2025-01-29 RX ADMIN — FENTANYL CITRATE 25 MCG: 50 INJECTION, SOLUTION INTRAMUSCULAR; INTRAVENOUS at 22:44

## 2025-01-29 RX ADMIN — PHENYLEPHRINE HYDROCHLORIDE 100 MCG: 10 INJECTION INTRAVENOUS at 11:56

## 2025-01-29 RX ADMIN — PHENYLEPHRINE HYDROCHLORIDE 200 MCG: 10 INJECTION INTRAVENOUS at 12:13

## 2025-01-29 RX ADMIN — SODIUM CHLORIDE 8 MG/HR: 9 INJECTION, SOLUTION INTRAVENOUS at 14:28

## 2025-01-29 RX ADMIN — MORPHINE SULFATE 2 MG: 2 INJECTION, SOLUTION INTRAMUSCULAR; INTRAVENOUS at 04:08

## 2025-01-29 ASSESSMENT — PAIN SCALES - GENERAL
PAINLEVEL_OUTOF10: 7
PAINLEVEL_OUTOF10: 3
PAINLEVEL_OUTOF10: 7
PAINLEVEL_OUTOF10: 7
PAINLEVEL_OUTOF10: 3

## 2025-01-29 ASSESSMENT — PAIN DESCRIPTION - DESCRIPTORS
DESCRIPTORS: DULL
DESCRIPTORS: CRAMPING
DESCRIPTORS: SPASM;DISCOMFORT

## 2025-01-29 ASSESSMENT — PAIN DESCRIPTION - ORIENTATION
ORIENTATION: OTHER (COMMENT)
ORIENTATION: ANTERIOR

## 2025-01-29 ASSESSMENT — PAIN DESCRIPTION - LOCATION
LOCATION: ABDOMEN;BACK
LOCATION: ABDOMEN
LOCATION: ABDOMEN;BACK
LOCATION: ABDOMEN
LOCATION: BACK;ABDOMEN
LOCATION: ABDOMEN

## 2025-01-29 ASSESSMENT — PAIN DESCRIPTION - PAIN TYPE: TYPE: ACUTE PAIN

## 2025-01-29 ASSESSMENT — PAIN - FUNCTIONAL ASSESSMENT: PAIN_FUNCTIONAL_ASSESSMENT: ACTIVITIES ARE NOT PREVENTED

## 2025-01-29 ASSESSMENT — ENCOUNTER SYMPTOMS: SHORTNESS OF BREATH: 0

## 2025-01-29 ASSESSMENT — PAIN DESCRIPTION - FREQUENCY: FREQUENCY: CONTINUOUS

## 2025-01-29 ASSESSMENT — PAIN DESCRIPTION - ONSET: ONSET: ON-GOING

## 2025-01-29 NOTE — PROGRESS NOTES
Patient arrived to unit around 1330 from PACU, patient A+Ox4 and orientated to room. Patient admits to pain but says he does not want anything at the moment. Patient asked for RN to take pillow out from under his head and place on eyes. RN stated to patient she will let him rest and then come back in a little bit to assess patient, IV, etc.     Patient resting with door close, call light in reach.

## 2025-01-29 NOTE — PROGRESS NOTES
Writer remained with patient for the first 15min of blood transfusion. Patient tolerated well, no s/s of reaction noted.  Transfusion rate at 150mls/hr. Patient requests iv morphine.

## 2025-01-29 NOTE — OP NOTE
[facility] End[facility]  EGD  Patient: Christiano Rider            Date:   2025  : 1970       Med Rec#: [id number]     PROCEDURE:  EGD   INDICATION: GI bleeding (today's colonoscopy revealed blood in the terminal ileum)    FINDINGS  Irregular Z-line  A 2 cm hiatal hernia was noted on retroflexion.   Multiple benign-appearing fundic gland polyps in the body and fundus of the stomach  Upper GI tract otherwise normal  No blood was noted anywhere in the upper GI tract    PLAN  Blood noted in the terminal with negative EGD, thus, small bowel hemorrhage is most likely   Will order STAT CT angiogram  Check serial hemoglobin transfuse as necessary  Continue PPI at the current dose    MEDICATIONS:  MAC    ESTIMATED BLOOD LOSS:  none  Specimen(s) Removed: As stated above  COMPLICATIONS:  none  Prior to the procedure, the patient's history was reviewed and a directed physical examination was performed.   Informed consent was obtained.  After adequate sedation was achieved, the scope was inserted into the mouth and advanced to the second portion of the duodenum.  As the scope was withdrawn, the anatomy and the appearance of the mucosa was noted.     Munir Rojas M.D.

## 2025-01-29 NOTE — PROGRESS NOTES
Writer to room to speak to patient regarding Dr. Rojas calling nurses' station and the importance of having a colonoscopy tomorrow. Writer reviewed bowel prep and instructed patient NPO after midnight except for meds and bowel prep. Patient is encouraged to drink the bowel prep, and 2 large cups of bowel prep given to patient and encouraged to start drinking.

## 2025-01-29 NOTE — PROGRESS NOTES
Pt has had two bright red bloody, watery bowel movements. Complains of pain but satisfied after a dose of morphine.

## 2025-01-29 NOTE — PLAN OF CARE
[facility]  Office: [phone]  Herminio Arreguin DO, Luigi Ayers DO, Ashwin Patton DO, Fabiano Mckoy DO, Dereje Condon MD, Neyda Jessica MD, Reggie Ware MD, Briseyda Shelley MD,  Jorge L Alfaro MD, Elizabeth Narayan MD, Wesley Guzman MD,  Td Garay DO, Sallie King MD, Dyllan Espitia MD, Steve Arreguin DO, Kim Millan MD,  Enoch Dwyer DO, Ambar De Leon MD, Loulou York MD, Tati Fried MD, Cintia Arriaga MD,  Mino Zambrano MD, Donovan Beavers MD, Rima Comer MD, Usha Morales MD, Mac Thornton MD, Mira Amaya MD, Victor Hugo Mota DO, Juan Alberto Tobar MD, Td Pederson MD, Mohsin Reza, MD, Shirley Waterhouse, CNP,  Ariane Bunch CNP, Victor Hugo Giron, CNP,  Opal Douglass, DNP, Tosin Clement, CNP, Laurita Howard, CNP, Lori Cooper, CNP, Anjelica Zayas, CNP, Faith Ye, PA-C, Kirsten Carrillo PA-C, Arin Brian, CNP, Sawyer Banuelos, CNP,  Jasmina Herring, CNP, Eleni Austin, CNP, Mari Malik, CNP,  Ursula Lin, CNS, Olga Lidia Arellano, CNP, Paula Gaston, CNP,   Mary Lou Bueno, CNP         [facility]   IN-PATIENT SERVICE   [facility]    Second Visit Note  For more detailed information please refer to the progress note of the day      1/29/2025    4:32 PM    Name:   Christiano Rider  MRN:     [id number]     Acct:      [id number]   Room:   1106/1106-01  IP Day:  2  Admit Date:  1/27/2025  5:04 PM    PCP:   Elan Shields MD  Code Status:  Full Code      Pt vitals were reviewed   New labs were reviewed   Patient was seen    Updated plan :     Patient and family updated on CTA findings, concern for mid small bowel hemorrhage with possible mass.  Advised of the findings been discussed with interventional radiology at [facility] and arranging urgent transfer for possible embolization and management.  Requested surgical ICU admission as the patient will also benefit from surgical opinion regarding the possible small bowel mass.  Patient

## 2025-01-29 NOTE — OP NOTE
[facility] Endoscopy  COLONOSCOPY    Patient: Christiano Rider   Date:  2025   : 1970       Med Rec#: [id number]     Procedure:  Colonoscopy  Indication: Melenic stools with negative EGD    Findings   Small hemorrhoids, internal  Mild diverticulosis, left-sided  Fresh blood, melenic stools, and clots throughout the entire colon.  Melenic stool in the terminal ileum    Recommendations  Due to the poor prep related to the bleeding, this exam DOES NOT constitute adequate screening.  Will perform follow-up out patient colonoscopy.  Repeat EGD at this time    Appropriate Photos Taken: Yes  Specimen(s) Removed: As stated above  Withdrawal Time: 9 minutes  Estimated Blood Loss: No blood loss related to the procedure  Anesthesia:  MAC    Complications:   Poor prep  Description of Procedure:  Informed consent was obtained after explanation of the procedure including indications, description of the procedure,  benefits and possible risks and complications of the procedure, and alternatives.  All questions were answered.  The patient's history was reviewed and a directed physical examination was performed prior to the procedure.  The patient was monitored throughout the procedure with pulse oximetry and periodic assessment of vital signs.  With the patient initially in the left lateral decubitus position, a digital rectal examination was performed.  The video endoscope was placed in the patient's rectum and advanced without difficulty  to the terminal ileum.  The prep was poor.   Examination of the mucosa and the anatomy was performed during both introduction and withdrawal of the endoscope.     Munir Rojas M.D.  2025 at 11:50 AM

## 2025-01-29 NOTE — PROGRESS NOTES
Writer made aware, patient had a large emesis of clear liquid. 2nd RN to room to administer zofran.

## 2025-01-29 NOTE — PROGRESS NOTES
RN called and gave report to RN LISA Peraza answered all of transferring nurses questions. RN explained everything to patient and brother. Patient complained of no pain at time of transfer. Life flight left with patient via stretcher. Patients brother left while patient being loaded onto stretcher

## 2025-01-29 NOTE — PROGRESS NOTES
[facility]  Office: [phone]  Herminio Arreguin DO, Luigi Ayers DO, Ashwin Patton DO, Fabiano Mckoy DO, Dereje Condon MD, Neyda Jessica MD, Reggie Ware MD, Briseyda Shelley MD,  Jorge L Alfaro MD, Elizabeth Narayan MD, Wesley Guzman MD,  Td Garay DO, Sallie King MD, Dyllan Espitia MD, Steve Arreguin DO, Kim Millan MD,  Enoch Dwyer DO, Ambar De Leon MD, Loulou York MD, Tati Fried MD, Cintia Arriaga MD,  Mino Zambrano MD, Donovan Beavers MD, Rima Comer MD, Usha Morales MD, Mac Thornton MD, Mira Amaya MD, Victor Hugo Mota DO, Juan Alberto Tobar MD, Td Pederson MD, Mohsin Reza, MD, Shirley Waterhouse, CNP,  Ariane Bunch CNP, Victor Hugo Giron, CNP,  Opal Douglass, DNP, Tosin Clement, CNP, Laurita Howard, CNP, Lori Copoer, CNP, Anjelica Zayas, CNP, Faith Ye, PA-C, Kirsten Carrillo, PA-C, Arin Brian, CNP, Sawyer Banuelos, CNP,  Jasmina Herring, CNP, Eleni Austin, CNP, Mari Malik, CNP,  Ursula Lin, CNS, Olga Lidia Arellano, CNP, Paula Gaston, CNP,   Mary Lou Bueno, CNP         [facility]   IN-PATIENT SERVICE   [facility]    Progress Note    1/29/2025    1:31 PM    Name:   Christiano Rider  MRN:     [id number]     Acct:      [id number]   Room:   [facility] Day:  2  Admit Date:  1/27/2025  5:04 PM    PCP:   Elan Shields MD  Code Status:  Full Code    Subjective:     C/C:   Chief Complaint   Patient presents with    Abdominal Pain     Pt states on new med and stool has been different, and having abdominal pain     Interval History Status: not changed.     Patient tolerated bowel prep overnight however continues to have blood-tinged stools.  Scheduled for colonoscopy today.  Denies any chest pain, shortness of breath, nausea or vomiting, fevers or chills.    Brief History:     This is a 54-year-old male who presents with a complaint of abdominal pain with history of prior esophageal stricture, GERD with asthma.  He

## 2025-01-29 NOTE — ANESTHESIA PRE PROCEDURE
Department of Anesthesiology  Preprocedure Note       Name:  Christiano Rider   Age:  54 y.o.  :  1970                                          MRN:  [id number]         Date:  2025      Surgeon: Surgeon(s):  Steve Rojas MD    Procedure: Procedure(s):  COLONOSCOPY DIAGNOSTIC    Medications prior to admission:   Prior to Admission medications    Medication Sig Start Date End Date Taking? Authorizing Provider   albuterol sulfate (PROAIR RESPICLICK) 108 (90 Base) MCG/ACT aerosol powder inhalation Inhale 2 puffs into the lungs every 6 hours as needed for Wheezing or Shortness of Breath 25  Yes Elan Shields MD   methylPREDNISolone (MEDROL DOSEPACK) 4 MG tablet Take by mouth as directed. 25  Yes Elan Shields MD   fluticasone-salmeterol (ADVAIR DISKUS) 100-50 MCG/ACT AEPB diskus inhaler Inhale 1 puff into the lungs in the morning and 1 puff in the evening. 24  Yes Elan Shields MD   montelukast (SINGULAIR) 10 MG tablet Take 1 tablet by mouth every evening 24  Yes Elan Shields MD   vitamin D (ERGOCALCIFEROL) 1.25 MG (04543 UT) CAPS capsule Take 1 capsule by mouth once a week 24  Yes Elan Shields MD   esomeprazole (NEXIUM) 20 MG delayed release capsule TAKE 1 CAPSULE BY MOUTH TWICE A DAY 22  Yes Roxanne Ware MD   albuterol sulfate  (90 Base) MCG/ACT inhaler Inhale 2 puffs into the lungs every 6 hours as needed for Wheezing or Shortness of Breath   Yes ProviderCandie MD   Fexofenadine-Pseudoephedrine (ALLEGRA-D 24 HOUR PO) Take 1 tablet by mouth    Yes Candie Dasilva MD       Current medications:    Current Facility-Administered Medications   Medication Dose Route Frequency Provider Last Rate Last Admin    0.9 % sodium chloride infusion   IntraVENous PRN Steve Rojas MD        0.9 % sodium chloride infusion   IntraVENous Continuous Steve Rojas  mL/hr at 25 0614 Rate Verify at 25 0614

## 2025-01-29 NOTE — ANESTHESIA POSTPROCEDURE EVALUATION
Department of Anesthesiology  Postprocedure Note    Patient: Christiano Rider  MRN: [id number]  YOB: 1970  Date of evaluation: 1/29/2025    Procedure Summary       Date: 01/29/25 Room / Location: [address]    Anesthesia Start: 1147 Anesthesia Stop: 1228    Procedures:       COLONOSCOPY DIAGNOSTIC      ESOPHAGOGASTRODUODENOSCOPY Diagnosis:       Anemia, unspecified type      (Anemia, unspecified type [D64.9])    Surgeons: Steve Rojas MD Responsible Provider: Emma Carter MD    Anesthesia Type: General, MAC ASA Status: 2            Anesthesia Type: General, MAC    Curtis Phase I: Curtis Score: 9    Curtis Phase II:      Anesthesia Post Evaluation    Airway patency: patent  Cardiovascular status: hemodynamically stable  Respiratory status: acceptable    No notable events documented.

## 2025-01-29 NOTE — PLAN OF CARE
Problem: Pain  Goal: Verbalizes/displays adequate comfort level or baseline comfort level  Outcome: Progressing  Flowsheets (Taken 1/29/2025 0500)  Verbalizes/displays adequate comfort level or baseline comfort level:   Encourage patient to monitor pain and request assistance   Assess pain using appropriate pain scale   Administer analgesics based on type and severity of pain and evaluate response   Implement non-pharmacological measures as appropriate and evaluate response   Consider cultural and social influences on pain and pain management   Notify Licensed Independent Practitioner if interventions unsuccessful or patient reports new pain     Problem: Safety - Adult  Goal: Free from fall injury  Outcome: Progressing  Flowsheets (Taken 1/29/2025 0500)  Free From Fall Injury:   Instruct family/caregiver on patient safety   Based on caregiver fall risk screen, instruct family/caregiver to ask for assistance with transferring infant if caregiver noted to have fall risk factors

## 2025-01-29 NOTE — PROGRESS NOTES
Blood bank made writer aware  H & H needs to be drawn prior to second unit transfused due to patient's hemoglobin above 7. Lab is aware patient has a gi bleed. Writer perfect served Ruiz PRO and lab to be drawn.

## 2025-01-29 NOTE — PROGRESS NOTES
[facility]  Office: [phone]  Herminio Arreguin DO, Luigi Ayers DO, Ashwin Patton DO, Fabiano Mckoy DO, Dereje Condon MD, Neyda Jessica MD, Reggie Ware MD, Briseyda Shelley MD,  Jorge L Alfaro MD, Elizabeth Narayan MD, Wesley Guzman MD,  Td Garay DO, Sallie King MD, Dyllan Espitia MD, Steve Arreguin DO, Kim Millan MD,  Enoch Dwyer DO, Ambar De Leon MD, Loulou York MD, Tati Fried MD, Cintia Arriaga MD,  Mino Zambrano MD, Donovan Beavers MD, Rima Comer MD, Usha Morales MD, Mac Thornton MD, Mira Amaya MD, Victor Hugo Mota DO, Juan Alberto Tobar MD, Td Pederson MD, Mohsin Reza, MD, Shirley Waterhouse, CNP,  Ariane Bunch, CNP, Victor Hugo Giron, CNP,  Opal Douglass, DNP, Tosin Clement, CNP, Laurita Howard, CNP, Lori Cooper, CNP, Anjelica Zayas, CNP, Faith Ye, PA-C, Kirsten Carrillo, PA-C, Arin Brian, CNP, Sawyer Banuelos, CNP,  Jasmina Herring, CNP, Eleni Austin, CNP, Mari Malik, CNP,  Ursula Lin, CNS, Olga Lidia Arellano, CNP, Paula Gaston, CNP,   Mary Lou Bueno, CNP         [facility]   Nighttime In-House Provider      1/28/2025    7:54 PM    Name:   Christiano Rider  MRN:     [id number]     Acct:      [id number]   Room:   2108/2108-01  IP Day:  1  Admit Date:  1/27/2025  5:04 PM    PCP:   Elan Shields MD  Code Status:  Full Code    Called to the floor by staff to evaluate Christiano Rider in 2108/2108-01 at 7:54 PM with complaint of weakness.  Patient was seen and evaluated at bedside.  He had just had a large bloody bowel movement and is reluctant to proceed with colonoscopy tomorrow.  Patient is resting in bed, states he feels like he has developed a fever and overall is not feeling well.  He is complaining of abdominal pain, which is somewhat improved after having his bowel movement.  He states he feels like he currently has a fever and chills.  Denies any chest pain, shortness of breath, nausea, vomiting.

## 2025-01-29 NOTE — DISCHARGE SUMMARY
[facility]  Office: [phone]  Herminio Arreguin DO, Luigi Ayers DO, Ashwin Patton DO, Fabiano Mckoy DO, Dereje Condon MD, Neyda Jessica MD, Reggie Ware MD, Briseyda Shelley MD,  Jorge L Alfaro MD, Elizabeth Narayan MD, Wesley Guzman MD,  Td Garay DO, Sallie King MD, Dyllan Espitia MD, Steve Arreguin DO, Kim Millan MD,  Enoch Dwyer DO, Ambar De Leon MD, Loulou York MD, Tati Fried MD, Cintia Arriaga MD,  Mino Zambrano MD, Donovan Beavers MD, Rima Comer MD, Usha Morales MD, Mac Thornton MD, Mira Amaya MD, Victor Hugo Mota DO, Juan Alberto Tobar MD, Td Pederson MD, Mohsin Reza, MD, Shirley Waterhouse, CNP,  Ariane Bunch CNP, Victor Hugo Giron, CNP,  Opal Douglass, DNP, Tosin Clement, CNP, Laurita Howard, CNP, Lori Cooper, CNP, Anjelica Zayas, CNP, Faith Ye, PA-C, Kirsten Carrillo PA-C, Arin Brian, CNP, Sawyer Banuelos, CNP,  Jasmina Herring, CNP, Eleni Austin, CNP, Mari Malik, CNP,  Ursula Lin, CNS, Olga Lidia Arellano, CNP, Paula Gaston, CNP,   Mary Lou Bueno, CNP         [facility]   IN-PATIENT SERVICE   [facility]    Discharge Summary     Patient ID: Christiano Rider  :  1970   MRN: [id number]     ACCOUNT:  [id number]   Patient's PCP: Elan Shields MD  Admit Date: 2025   Discharge Date: 2025     Length of Stay: 2  Code Status:  Full Code  Admitting Physician: Ashwin Patton DO  Discharge Physician: Ashwin Patton DO     Active Discharge Diagnoses:     Hospital Problem Lists:  Principal Problem:    Small intestinal hemorrhage  Active Problems:    Asthma    GERD (gastroesophageal reflux disease)    Esophageal stricture    Hypokalemia    Acute blood loss anemia    Small bowel mass  Resolved Problems:    * No resolved hospital problems. *      Admission Condition:  fair     Discharged Condition: fair    Hospital Stay:     Hospital Course:  Christiano MEJÍA Tereso is a 54 y.o. male who was

## 2025-01-29 NOTE — PROGRESS NOTES
Writer remained with patient for first 15 min of transfusion. Patient's transfusion rate increased to 150mls/hr.

## 2025-01-29 NOTE — PROGRESS NOTES
Patient has been extremely fatigued and weak since returning from EGD and stated that he was not going to be able to get to the bathroom or commode for bowel prep. Dr. Rojas was notified and 2 units of PRBC's were ordered. Colonoscopy is on hold at this point. Patient later got up to commode at bedside and heart rate elevated to the 160's. [facility] notified and evaluated patient.

## 2025-01-30 ENCOUNTER — ANESTHESIA (OUTPATIENT)
Dept: OPERATING ROOM | Age: 55
End: 2025-01-30
Payer: COMMERCIAL

## 2025-01-30 ENCOUNTER — ANESTHESIA EVENT (OUTPATIENT)
Dept: OPERATING ROOM | Age: 55
End: 2025-01-30
Payer: COMMERCIAL

## 2025-01-30 LAB
ABO/RH: NORMAL
ANION GAP SERPL CALCULATED.3IONS-SCNC: 7 MMOL/L (ref 9–16)
ANION GAP SERPL CALCULATED.3IONS-SCNC: 8 MMOL/L (ref 9–16)
ANTIBODY SCREEN: NEGATIVE
ARM BAND NUMBER: NORMAL
ARTERIAL PATENCY WRIST A: ABNORMAL
BASOPHILS # BLD: 0.05 K/UL (ref 0–0.2)
BASOPHILS # BLD: <0.03 K/UL (ref 0–0.2)
BASOPHILS NFR BLD: 0 % (ref 0–2)
BASOPHILS NFR BLD: 0 % (ref 0–2)
BLOOD BANK DISPENSE STATUS: NORMAL
BLOOD BANK SAMPLE EXPIRATION: NORMAL
BODY TEMPERATURE: 36.7
BPU ID: NORMAL
BUN SERPL-MCNC: 16 MG/DL (ref 6–20)
BUN SERPL-MCNC: 17 MG/DL (ref 6–20)
CA-I BLD-SCNC: 1.05 MMOL/L (ref 1.13–1.33)
CA-I BLD-SCNC: 1.06 MMOL/L (ref 1.13–1.33)
CA-I BLD-SCNC: 1.08 MMOL/L (ref 1.13–1.33)
CALCIUM SERPL-MCNC: 7.4 MG/DL (ref 8.6–10.4)
CALCIUM SERPL-MCNC: 7.6 MG/DL (ref 8.6–10.4)
CHLORIDE SERPL-SCNC: 106 MMOL/L (ref 98–107)
CHLORIDE SERPL-SCNC: 107 MMOL/L (ref 98–107)
CHLORIDE, WHOLE BLOOD: 105 MMOL/L (ref 98–110)
CO2 SERPL-SCNC: 22 MMOL/L (ref 20–31)
CO2 SERPL-SCNC: 23 MMOL/L (ref 20–31)
COHGB MFR BLD: 1.1 % (ref 0–5)
COMPONENT: NORMAL
CREAT SERPL-MCNC: 0.7 MG/DL (ref 0.7–1.2)
CREAT SERPL-MCNC: 0.7 MG/DL (ref 0.7–1.2)
CROSSMATCH RESULT: NORMAL
EOSINOPHIL # BLD: 0.06 K/UL (ref 0–0.44)
EOSINOPHIL # BLD: 0.11 K/UL (ref 0–0.44)
EOSINOPHILS RELATIVE PERCENT: 0 % (ref 1–4)
EOSINOPHILS RELATIVE PERCENT: 1 % (ref 1–4)
ERYTHROCYTE [DISTWIDTH] IN BLOOD BY AUTOMATED COUNT: 13.2 % (ref 11.8–14.4)
ERYTHROCYTE [DISTWIDTH] IN BLOOD BY AUTOMATED COUNT: 13.4 % (ref 11.8–14.4)
FIBRINOGEN, FUNCTIONAL TEG: 16.2 MM (ref 15–32)
FIBRINOGEN, FUNCTIONAL TEG: 16.6 MM (ref 15–32)
FIO2 ON VENT: ABNORMAL %
GFR, ESTIMATED: >90 ML/MIN/1.73M2
GFR, ESTIMATED: >90 ML/MIN/1.73M2
GLUCOSE BLD-MCNC: 123 MG/DL (ref 65–105)
GLUCOSE SERPL-MCNC: 136 MG/DL (ref 74–99)
GLUCOSE SERPL-MCNC: 148 MG/DL (ref 74–99)
HCO3 ARTERIAL: 23.4 MMOL/L (ref 22–27)
HCT VFR BLD AUTO: 21.2 % (ref 40.7–50.3)
HCT VFR BLD AUTO: 25.9 % (ref 40.7–50.3)
HCT VFR BLD AUTO: 27 % (ref 40.7–50.3)
HCT VFR BLD CALC: 19 % (ref 40.7–50.3)
HEMOGLOBIN: 6.5 GM/DL (ref 13–17)
HGB BLD-MCNC: 7.3 G/DL (ref 13–17)
HGB BLD-MCNC: 8.9 G/DL (ref 13–17)
HGB BLD-MCNC: 9.2 G/DL (ref 13–17)
IMM GRANULOCYTES # BLD AUTO: 0.09 K/UL (ref 0–0.3)
IMM GRANULOCYTES # BLD AUTO: 0.2 K/UL (ref 0–0.3)
IMM GRANULOCYTES NFR BLD: 1 %
IMM GRANULOCYTES NFR BLD: 1 %
INR PPP: 1.2
INR PPP: 1.2
INR PPP: 1.3
LACTIC ACID, WHOLE BLOOD: 1.5 MMOL/L (ref 0.7–2.1)
LY30 (LYSIS) TEG: 0.8 % (ref 0–2.6)
LY30 (LYSIS) TEG: 1.2 % (ref 0–2.6)
LYMPHOCYTES NFR BLD: 1.47 K/UL (ref 1.1–3.7)
LYMPHOCYTES NFR BLD: 1.93 K/UL (ref 1.1–3.7)
LYMPHOCYTES RELATIVE PERCENT: 10 % (ref 24–43)
LYMPHOCYTES RELATIVE PERCENT: 11 % (ref 24–43)
MA(MAX CLOT) RAPID TEG: 55.7 MM (ref 52–70)
MA(MAX CLOT) RAPID TEG: 57.7 MM (ref 52–70)
MAGNESIUM SERPL-MCNC: 1.6 MG/DL (ref 1.6–2.6)
MAGNESIUM SERPL-MCNC: 1.9 MG/DL (ref 1.6–2.6)
MCH RBC QN AUTO: 30.3 PG (ref 25.2–33.5)
MCH RBC QN AUTO: 30.5 PG (ref 25.2–33.5)
MCHC RBC AUTO-ENTMCNC: 34.4 G/DL (ref 28.4–34.8)
MCHC RBC AUTO-ENTMCNC: 34.4 G/DL (ref 28.4–34.8)
MCV RBC AUTO: 88 FL (ref 82.6–102.9)
MCV RBC AUTO: 88.7 FL (ref 82.6–102.9)
MONOCYTES NFR BLD: 0.67 K/UL (ref 0.1–1.2)
MONOCYTES NFR BLD: 1.15 K/UL (ref 0.1–1.2)
MONOCYTES NFR BLD: 5 % (ref 3–12)
MONOCYTES NFR BLD: 6 % (ref 3–12)
NEGATIVE BASE EXCESS, ART: 1.2 MMOL/L (ref 0–2)
NEUTROPHILS NFR BLD: 82 % (ref 36–65)
NEUTROPHILS NFR BLD: 84 % (ref 36–65)
NEUTS SEG NFR BLD: 11.66 K/UL (ref 1.5–8.1)
NEUTS SEG NFR BLD: 15.67 K/UL (ref 1.5–8.1)
NRBC BLD-RTO: 0 PER 100 WBC
NRBC BLD-RTO: 0 PER 100 WBC
O2 SAT, ARTERIAL: 99.6 % (ref 94–100)
PCO2 ARTERIAL: 38.5 MMHG (ref 32–45)
PH ARTERIAL: 7.4 (ref 7.35–7.45)
PHOSPHATE SERPL-MCNC: 2.7 MG/DL (ref 2.5–4.5)
PHOSPHATE SERPL-MCNC: 3.7 MG/DL (ref 2.5–4.5)
PLATELET # BLD AUTO: 200 K/UL (ref 138–453)
PLATELET # BLD AUTO: 202 K/UL (ref 138–453)
PMV BLD AUTO: 10.2 FL (ref 8.1–13.5)
PMV BLD AUTO: 9.7 FL (ref 8.1–13.5)
PO2 ARTERIAL: 248.8 MMHG (ref 75–95)
POTASSIUM SERPL-SCNC: 3.6 MMOL/L (ref 3.7–5.3)
POTASSIUM SERPL-SCNC: 3.9 MMOL/L (ref 3.7–5.3)
POTASSIUM, WHOLE BLOOD: 3.7 MMOL/L (ref 3.6–5)
PROTHROMBIN TIME: 14.7 SEC (ref 11.7–14.9)
PROTHROMBIN TIME: 15 SEC (ref 11.7–14.9)
PROTHROMBIN TIME: 15.8 SEC (ref 11.7–14.9)
RBC # BLD AUTO: 2.41 M/UL (ref 4.21–5.77)
RBC # BLD AUTO: 2.92 M/UL (ref 4.21–5.77)
REACTION TIME TEG: 5.7 MIN (ref 4.6–9.1)
REACTION TIME TEG: 6 MIN (ref 4.6–9.1)
SODIUM SERPL-SCNC: 136 MMOL/L (ref 136–145)
SODIUM SERPL-SCNC: 137 MMOL/L (ref 136–145)
SODIUM, WHOLE BLOOD: 133 MMOL/L (ref 136–145)
TRANSFUSION STATUS: NORMAL
TROPONIN I SERPL HS-MCNC: 8 NG/L (ref 0–22)
UNIT DIVISION: 0
WBC OTHER # BLD: 14 K/UL (ref 3.5–11.3)
WBC OTHER # BLD: 19.1 K/UL (ref 3.5–11.3)

## 2025-01-30 PROCEDURE — 6360000002 HC RX W HCPCS: Performed by: NURSE PRACTITIONER

## 2025-01-30 PROCEDURE — 3700000000 HC ANESTHESIA ATTENDED CARE: Performed by: SURGERY

## 2025-01-30 PROCEDURE — 2580000003 HC RX 258

## 2025-01-30 PROCEDURE — 94761 N-INVAS EAR/PLS OXIMETRY MLT: CPT

## 2025-01-30 PROCEDURE — 6360000002 HC RX W HCPCS

## 2025-01-30 PROCEDURE — 36415 COLL VENOUS BLD VENIPUNCTURE: CPT

## 2025-01-30 PROCEDURE — 85390 FIBRINOLYSINS SCREEN I&R: CPT

## 2025-01-30 PROCEDURE — 7100000001 HC PACU RECOVERY - ADDTL 15 MIN: Performed by: SURGERY

## 2025-01-30 PROCEDURE — 6360000002 HC RX W HCPCS: Performed by: STUDENT IN AN ORGANIZED HEALTH CARE EDUCATION/TRAINING PROGRAM

## 2025-01-30 PROCEDURE — 99232 SBSQ HOSP IP/OBS MODERATE 35: CPT | Performed by: SURGERY

## 2025-01-30 PROCEDURE — 2700000000 HC OXYGEN THERAPY PER DAY

## 2025-01-30 PROCEDURE — 99222 1ST HOSP IP/OBS MODERATE 55: CPT | Performed by: INTERNAL MEDICINE

## 2025-01-30 PROCEDURE — 85025 COMPLETE CBC W/AUTO DIFF WBC: CPT

## 2025-01-30 PROCEDURE — 82330 ASSAY OF CALCIUM: CPT

## 2025-01-30 PROCEDURE — 85610 PROTHROMBIN TIME: CPT

## 2025-01-30 PROCEDURE — 80048 BASIC METABOLIC PNL TOTAL CA: CPT

## 2025-01-30 PROCEDURE — 86927 PLASMA FRESH FROZEN: CPT

## 2025-01-30 PROCEDURE — 2500000003 HC RX 250 WO HCPCS

## 2025-01-30 PROCEDURE — 85018 HEMOGLOBIN: CPT

## 2025-01-30 PROCEDURE — 86850 RBC ANTIBODY SCREEN: CPT

## 2025-01-30 PROCEDURE — 82805 BLOOD GASES W/O2 SATURATION: CPT

## 2025-01-30 PROCEDURE — 86900 BLOOD TYPING SEROLOGIC ABO: CPT

## 2025-01-30 PROCEDURE — P9017 PLASMA 1 DONOR FRZ W/IN 8 HR: HCPCS

## 2025-01-30 PROCEDURE — 6360000002 HC RX W HCPCS: Performed by: INTERNAL MEDICINE

## 2025-01-30 PROCEDURE — 2580000003 HC RX 258: Performed by: INTERNAL MEDICINE

## 2025-01-30 PROCEDURE — 83735 ASSAY OF MAGNESIUM: CPT

## 2025-01-30 PROCEDURE — 85384 FIBRINOGEN ACTIVITY: CPT

## 2025-01-30 PROCEDURE — P9073 PLATELETS PHERESIS PATH REDU: HCPCS

## 2025-01-30 PROCEDURE — 2000000000 HC ICU R&B

## 2025-01-30 PROCEDURE — 85576 BLOOD PLATELET AGGREGATION: CPT

## 2025-01-30 PROCEDURE — 3600000014 HC SURGERY LEVEL 4 ADDTL 15MIN: Performed by: SURGERY

## 2025-01-30 PROCEDURE — P9016 RBC LEUKOCYTES REDUCED: HCPCS

## 2025-01-30 PROCEDURE — 2500000003 HC RX 250 WO HCPCS: Performed by: SURGERY

## 2025-01-30 PROCEDURE — 3600000004 HC SURGERY LEVEL 4 BASE: Performed by: SURGERY

## 2025-01-30 PROCEDURE — 2580000003 HC RX 258: Performed by: STUDENT IN AN ORGANIZED HEALTH CARE EDUCATION/TRAINING PROGRAM

## 2025-01-30 PROCEDURE — 6360000002 HC RX W HCPCS: Performed by: ANESTHESIOLOGY

## 2025-01-30 PROCEDURE — 7100000000 HC PACU RECOVERY - FIRST 15 MIN: Performed by: SURGERY

## 2025-01-30 PROCEDURE — 84132 ASSAY OF SERUM POTASSIUM: CPT

## 2025-01-30 PROCEDURE — 85014 HEMATOCRIT: CPT

## 2025-01-30 PROCEDURE — 2709999900 HC NON-CHARGEABLE SUPPLY: Performed by: SURGERY

## 2025-01-30 PROCEDURE — 88342 IMHCHEM/IMCYTCHM 1ST ANTB: CPT

## 2025-01-30 PROCEDURE — 84484 ASSAY OF TROPONIN QUANT: CPT

## 2025-01-30 PROCEDURE — 88341 IMHCHEM/IMCYTCHM EA ADD ANTB: CPT

## 2025-01-30 PROCEDURE — 88307 TISSUE EXAM BY PATHOLOGIST: CPT

## 2025-01-30 PROCEDURE — 2720000010 HC SURG SUPPLY STERILE: Performed by: SURGERY

## 2025-01-30 PROCEDURE — 0DJD4ZZ INSPECTION OF LOWER INTESTINAL TRACT, PERCUTANEOUS ENDOSCOPIC APPROACH: ICD-10-PCS | Performed by: SURGERY

## 2025-01-30 PROCEDURE — 3700000001 HC ADD 15 MINUTES (ANESTHESIA): Performed by: SURGERY

## 2025-01-30 PROCEDURE — 6370000000 HC RX 637 (ALT 250 FOR IP)

## 2025-01-30 PROCEDURE — 44120 REMOVAL OF SMALL INTESTINE: CPT | Performed by: SURGERY

## 2025-01-30 PROCEDURE — 85347 COAGULATION TIME ACTIVATED: CPT

## 2025-01-30 PROCEDURE — 86901 BLOOD TYPING SEROLOGIC RH(D): CPT

## 2025-01-30 PROCEDURE — 84100 ASSAY OF PHOSPHORUS: CPT

## 2025-01-30 PROCEDURE — 83605 ASSAY OF LACTIC ACID: CPT

## 2025-01-30 PROCEDURE — 86923 COMPATIBILITY TEST ELECTRIC: CPT

## 2025-01-30 PROCEDURE — 0DBA0ZZ EXCISION OF JEJUNUM, OPEN APPROACH: ICD-10-PCS | Performed by: SURGERY

## 2025-01-30 RX ORDER — ETOMIDATE 2 MG/ML
INJECTION INTRAVENOUS
Status: DISCONTINUED | OUTPATIENT
Start: 2025-01-30 | End: 2025-01-30 | Stop reason: SDUPTHER

## 2025-01-30 RX ORDER — VASOPRESSIN 20 [USP'U]/ML
INJECTION, SOLUTION INTRAMUSCULAR; SUBCUTANEOUS
Status: DISCONTINUED | OUTPATIENT
Start: 2025-01-30 | End: 2025-01-30 | Stop reason: SDUPTHER

## 2025-01-30 RX ORDER — SODIUM CHLORIDE, SODIUM LACTATE, POTASSIUM CHLORIDE, CALCIUM CHLORIDE 600; 310; 30; 20 MG/100ML; MG/100ML; MG/100ML; MG/100ML
INJECTION, SOLUTION INTRAVENOUS
Status: DISCONTINUED | OUTPATIENT
Start: 2025-01-30 | End: 2025-01-30 | Stop reason: SDUPTHER

## 2025-01-30 RX ORDER — POTASSIUM CHLORIDE 7.45 MG/ML
10 INJECTION INTRAVENOUS
Status: DISPENSED | OUTPATIENT
Start: 2025-01-30 | End: 2025-01-30

## 2025-01-30 RX ORDER — SODIUM CHLORIDE, SODIUM LACTATE, POTASSIUM CHLORIDE, CALCIUM CHLORIDE 600; 310; 30; 20 MG/100ML; MG/100ML; MG/100ML; MG/100ML
INJECTION, SOLUTION INTRAVENOUS CONTINUOUS
Status: DISCONTINUED | OUTPATIENT
Start: 2025-01-30 | End: 2025-01-31

## 2025-01-30 RX ORDER — ROCURONIUM BROMIDE 10 MG/ML
INJECTION, SOLUTION INTRAVENOUS
Status: DISCONTINUED | OUTPATIENT
Start: 2025-01-30 | End: 2025-01-30 | Stop reason: SDUPTHER

## 2025-01-30 RX ORDER — IPRATROPIUM BROMIDE AND ALBUTEROL SULFATE 2.5; .5 MG/3ML; MG/3ML
1 SOLUTION RESPIRATORY (INHALATION)
Status: DISCONTINUED | OUTPATIENT
Start: 2025-01-30 | End: 2025-01-30 | Stop reason: HOSPADM

## 2025-01-30 RX ORDER — PROPOFOL 10 MG/ML
INJECTION, EMULSION INTRAVENOUS
Status: DISCONTINUED | OUTPATIENT
Start: 2025-01-30 | End: 2025-01-30 | Stop reason: SDUPTHER

## 2025-01-30 RX ORDER — SODIUM CHLORIDE 9 MG/ML
INJECTION, SOLUTION INTRAVENOUS PRN
Status: DISCONTINUED | OUTPATIENT
Start: 2025-01-30 | End: 2025-01-30

## 2025-01-30 RX ORDER — SODIUM CHLORIDE 0.9 % (FLUSH) 0.9 %
5-40 SYRINGE (ML) INJECTION PRN
Status: DISCONTINUED | OUTPATIENT
Start: 2025-01-30 | End: 2025-01-30 | Stop reason: HOSPADM

## 2025-01-30 RX ORDER — CALCIUM GLUCONATE 20 MG/ML
2000 INJECTION, SOLUTION INTRAVENOUS ONCE
Status: COMPLETED | OUTPATIENT
Start: 2025-01-30 | End: 2025-01-30

## 2025-01-30 RX ORDER — NALOXONE HYDROCHLORIDE 0.4 MG/ML
INJECTION, SOLUTION INTRAMUSCULAR; INTRAVENOUS; SUBCUTANEOUS PRN
Status: DISCONTINUED | OUTPATIENT
Start: 2025-01-30 | End: 2025-01-30 | Stop reason: HOSPADM

## 2025-01-30 RX ORDER — MAGNESIUM SULFATE 1 G/100ML
1000 INJECTION INTRAVENOUS ONCE
Status: COMPLETED | OUTPATIENT
Start: 2025-01-30 | End: 2025-01-30

## 2025-01-30 RX ORDER — POLYETHYLENE GLYCOL 3350 17 G/17G
17 POWDER, FOR SOLUTION ORAL DAILY PRN
Status: DISCONTINUED | OUTPATIENT
Start: 2025-01-30 | End: 2025-01-31

## 2025-01-30 RX ORDER — MAGNESIUM SULFATE HEPTAHYDRATE 40 MG/ML
4000 INJECTION, SOLUTION INTRAVENOUS ONCE
Status: COMPLETED | OUTPATIENT
Start: 2025-01-30 | End: 2025-01-30

## 2025-01-30 RX ORDER — FENTANYL CITRATE 50 UG/ML
INJECTION, SOLUTION INTRAMUSCULAR; INTRAVENOUS
Status: DISCONTINUED | OUTPATIENT
Start: 2025-01-30 | End: 2025-01-30 | Stop reason: SDUPTHER

## 2025-01-30 RX ORDER — SODIUM CHLORIDE 0.9 % (FLUSH) 0.9 %
5-40 SYRINGE (ML) INJECTION EVERY 12 HOURS SCHEDULED
Status: DISCONTINUED | OUTPATIENT
Start: 2025-01-30 | End: 2025-01-30 | Stop reason: HOSPADM

## 2025-01-30 RX ORDER — PHENYLEPHRINE HCL IN 0.9% NACL 1 MG/10 ML
SYRINGE (ML) INTRAVENOUS
Status: DISCONTINUED | OUTPATIENT
Start: 2025-01-30 | End: 2025-01-30 | Stop reason: SDUPTHER

## 2025-01-30 RX ORDER — CALCIUM GLUCONATE 20 MG/ML
1000 INJECTION, SOLUTION INTRAVENOUS ONCE
Status: COMPLETED | OUTPATIENT
Start: 2025-01-30 | End: 2025-01-30

## 2025-01-30 RX ORDER — FENTANYL CITRATE 50 UG/ML
50 INJECTION, SOLUTION INTRAMUSCULAR; INTRAVENOUS
Status: DISCONTINUED | OUTPATIENT
Start: 2025-01-30 | End: 2025-01-31

## 2025-01-30 RX ORDER — SODIUM CHLORIDE 9 MG/ML
INJECTION, SOLUTION INTRAVENOUS PRN
Status: DISCONTINUED | OUTPATIENT
Start: 2025-01-30 | End: 2025-01-30 | Stop reason: HOSPADM

## 2025-01-30 RX ORDER — DIPHENHYDRAMINE HYDROCHLORIDE 50 MG/ML
25 INJECTION INTRAMUSCULAR; INTRAVENOUS ONCE
Status: COMPLETED | OUTPATIENT
Start: 2025-01-30 | End: 2025-01-30

## 2025-01-30 RX ORDER — DIPHENHYDRAMINE HYDROCHLORIDE 50 MG/ML
12.5 INJECTION INTRAMUSCULAR; INTRAVENOUS
Status: DISCONTINUED | OUTPATIENT
Start: 2025-01-30 | End: 2025-01-30 | Stop reason: HOSPADM

## 2025-01-30 RX ORDER — HYDRALAZINE HYDROCHLORIDE 20 MG/ML
10 INJECTION INTRAMUSCULAR; INTRAVENOUS
Status: DISCONTINUED | OUTPATIENT
Start: 2025-01-30 | End: 2025-01-30 | Stop reason: HOSPADM

## 2025-01-30 RX ORDER — LABETALOL HYDROCHLORIDE 5 MG/ML
10 INJECTION, SOLUTION INTRAVENOUS
Status: DISCONTINUED | OUTPATIENT
Start: 2025-01-30 | End: 2025-01-30 | Stop reason: HOSPADM

## 2025-01-30 RX ORDER — SODIUM CHLORIDE 9 MG/ML
INJECTION, SOLUTION INTRAVENOUS PRN
Status: DISCONTINUED | OUTPATIENT
Start: 2025-01-30 | End: 2025-02-07 | Stop reason: HOSPADM

## 2025-01-30 RX ORDER — PROCHLORPERAZINE EDISYLATE 5 MG/ML
5 INJECTION INTRAMUSCULAR; INTRAVENOUS
Status: DISCONTINUED | OUTPATIENT
Start: 2025-01-30 | End: 2025-01-30 | Stop reason: HOSPADM

## 2025-01-30 RX ORDER — MIDAZOLAM HYDROCHLORIDE 1 MG/ML
INJECTION, SOLUTION INTRAMUSCULAR; INTRAVENOUS
Status: DISCONTINUED | OUTPATIENT
Start: 2025-01-30 | End: 2025-01-30 | Stop reason: SDUPTHER

## 2025-01-30 RX ORDER — FENTANYL CITRATE 50 UG/ML
50 INJECTION, SOLUTION INTRAMUSCULAR; INTRAVENOUS ONCE
Status: DISCONTINUED | OUTPATIENT
Start: 2025-01-30 | End: 2025-01-30

## 2025-01-30 RX ORDER — LORAZEPAM 2 MG/ML
0.5 INJECTION INTRAMUSCULAR ONCE
Status: COMPLETED | OUTPATIENT
Start: 2025-01-30 | End: 2025-01-30

## 2025-01-30 RX ORDER — NALOXONE HYDROCHLORIDE 0.4 MG/ML
INJECTION, SOLUTION INTRAMUSCULAR; INTRAVENOUS; SUBCUTANEOUS PRN
Status: DISCONTINUED | OUTPATIENT
Start: 2025-01-30 | End: 2025-01-30

## 2025-01-30 RX ORDER — BUPIVACAINE HYDROCHLORIDE AND EPINEPHRINE 2.5; 5 MG/ML; UG/ML
INJECTION, SOLUTION INFILTRATION; PERINEURAL PRN
Status: DISCONTINUED | OUTPATIENT
Start: 2025-01-30 | End: 2025-01-30 | Stop reason: ALTCHOICE

## 2025-01-30 RX ORDER — NALOXONE HYDROCHLORIDE 0.4 MG/ML
INJECTION, SOLUTION INTRAMUSCULAR; INTRAVENOUS; SUBCUTANEOUS PRN
Status: DISCONTINUED | OUTPATIENT
Start: 2025-01-30 | End: 2025-02-01

## 2025-01-30 RX ORDER — SUCCINYLCHOLINE CHLORIDE 20 MG/ML
INJECTION INTRAMUSCULAR; INTRAVENOUS
Status: DISCONTINUED | OUTPATIENT
Start: 2025-01-30 | End: 2025-01-30 | Stop reason: SDUPTHER

## 2025-01-30 RX ORDER — MAGNESIUM HYDROXIDE 1200 MG/15ML
LIQUID ORAL CONTINUOUS PRN
Status: COMPLETED | OUTPATIENT
Start: 2025-01-30 | End: 2025-01-30

## 2025-01-30 RX ADMIN — HYDROMORPHONE HYDROCHLORIDE 1 MG: 1 INJECTION, SOLUTION INTRAMUSCULAR; INTRAVENOUS; SUBCUTANEOUS at 13:39

## 2025-01-30 RX ADMIN — LORAZEPAM 0.5 MG: 2 INJECTION INTRAMUSCULAR; INTRAVENOUS at 06:59

## 2025-01-30 RX ADMIN — HYDROMORPHONE HYDROCHLORIDE: 10 INJECTION, SOLUTION INTRAMUSCULAR; INTRAVENOUS; SUBCUTANEOUS at 14:48

## 2025-01-30 RX ADMIN — HYDROMORPHONE HYDROCHLORIDE 0.5 MG: 1 INJECTION, SOLUTION INTRAMUSCULAR; INTRAVENOUS; SUBCUTANEOUS at 12:03

## 2025-01-30 RX ADMIN — ONDANSETRON 4 MG: 2 INJECTION INTRAMUSCULAR; INTRAVENOUS at 01:50

## 2025-01-30 RX ADMIN — MAGNESIUM SULFATE HEPTAHYDRATE 4000 MG: 40 INJECTION, SOLUTION INTRAVENOUS at 07:16

## 2025-01-30 RX ADMIN — Medication 200 MCG: at 10:21

## 2025-01-30 RX ADMIN — CEFAZOLIN 2000 MG: 1 INJECTION, POWDER, FOR SOLUTION INTRAMUSCULAR; INTRAVENOUS at 10:13

## 2025-01-30 RX ADMIN — Medication 100 MG: at 10:08

## 2025-01-30 RX ADMIN — SODIUM CHLORIDE, PRESERVATIVE FREE 40 MG: 5 INJECTION INTRAVENOUS at 13:20

## 2025-01-30 RX ADMIN — MAGNESIUM SULFATE HEPTAHYDRATE 1000 MG: 1 INJECTION, SOLUTION INTRAVENOUS at 14:41

## 2025-01-30 RX ADMIN — HYDROMORPHONE HYDROCHLORIDE 0.5 MG: 1 INJECTION, SOLUTION INTRAMUSCULAR; INTRAVENOUS; SUBCUTANEOUS at 11:55

## 2025-01-30 RX ADMIN — FENTANYL CITRATE 50 MCG: 50 INJECTION, SOLUTION INTRAMUSCULAR; INTRAVENOUS at 03:11

## 2025-01-30 RX ADMIN — FENTANYL CITRATE 25 MCG: 50 INJECTION, SOLUTION INTRAMUSCULAR; INTRAVENOUS at 11:39

## 2025-01-30 RX ADMIN — MIDAZOLAM 2 MG: 1 INJECTION INTRAMUSCULAR; INTRAVENOUS at 10:01

## 2025-01-30 RX ADMIN — CALCIUM GLUCONATE 2000 MG: 20 INJECTION, SOLUTION INTRAVENOUS at 16:06

## 2025-01-30 RX ADMIN — FENTANYL CITRATE 50 MCG: 50 INJECTION, SOLUTION INTRAMUSCULAR; INTRAVENOUS at 10:36

## 2025-01-30 RX ADMIN — POTASSIUM CHLORIDE 10 MEQ: 7.45 INJECTION INTRAVENOUS at 19:55

## 2025-01-30 RX ADMIN — FENTANYL CITRATE: 50 INJECTION, SOLUTION INTRAMUSCULAR; INTRAVENOUS at 15:53

## 2025-01-30 RX ADMIN — SODIUM CHLORIDE, POTASSIUM CHLORIDE, SODIUM LACTATE AND CALCIUM CHLORIDE: 600; 310; 30; 20 INJECTION, SOLUTION INTRAVENOUS at 11:23

## 2025-01-30 RX ADMIN — POTASSIUM CHLORIDE 10 MEQ: 7.45 INJECTION INTRAVENOUS at 21:26

## 2025-01-30 RX ADMIN — VASOPRESSIN 1 UNITS: 20 INJECTION, SOLUTION INTRAVENOUS at 10:46

## 2025-01-30 RX ADMIN — HYDROMORPHONE HYDROCHLORIDE 0.5 MG: 1 INJECTION, SOLUTION INTRAMUSCULAR; INTRAVENOUS; SUBCUTANEOUS at 11:50

## 2025-01-30 RX ADMIN — DIPHENHYDRAMINE HYDROCHLORIDE 25 MG: 50 INJECTION INTRAMUSCULAR; INTRAVENOUS at 15:02

## 2025-01-30 RX ADMIN — PIPERACILLIN AND TAZOBACTAM 3375 MG: 3; .375 INJECTION, POWDER, LYOPHILIZED, FOR SOLUTION INTRAVENOUS at 23:28

## 2025-01-30 RX ADMIN — FENTANYL CITRATE 100 MCG: 50 INJECTION, SOLUTION INTRAMUSCULAR; INTRAVENOUS at 10:08

## 2025-01-30 RX ADMIN — SODIUM CHLORIDE, POTASSIUM CHLORIDE, SODIUM LACTATE AND CALCIUM CHLORIDE: 600; 310; 30; 20 INJECTION, SOLUTION INTRAVENOUS at 03:16

## 2025-01-30 RX ADMIN — Medication 200 MCG: at 10:41

## 2025-01-30 RX ADMIN — VASOPRESSIN 1 UNITS: 20 INJECTION, SOLUTION INTRAVENOUS at 10:23

## 2025-01-30 RX ADMIN — FENTANYL CITRATE 50 MCG: 50 INJECTION, SOLUTION INTRAMUSCULAR; INTRAVENOUS at 11:26

## 2025-01-30 RX ADMIN — FENTANYL CITRATE 50 MCG: 50 INJECTION, SOLUTION INTRAMUSCULAR; INTRAVENOUS at 13:15

## 2025-01-30 RX ADMIN — SODIUM CHLORIDE, POTASSIUM CHLORIDE, SODIUM LACTATE AND CALCIUM CHLORIDE: 600; 310; 30; 20 INJECTION, SOLUTION INTRAVENOUS at 14:22

## 2025-01-30 RX ADMIN — CALCIUM GLUCONATE 1000 MG: 20 INJECTION, SOLUTION INTRAVENOUS at 07:31

## 2025-01-30 RX ADMIN — SUGAMMADEX 200 MG: 100 INJECTION, SOLUTION INTRAVENOUS at 11:26

## 2025-01-30 RX ADMIN — ROCURONIUM BROMIDE 50 MG: 10 INJECTION, SOLUTION INTRAVENOUS at 10:22

## 2025-01-30 RX ADMIN — ETOMIDATE 20 MG: 2 INJECTION INTRAVENOUS at 10:08

## 2025-01-30 RX ADMIN — CALCIUM GLUCONATE 1000 MG: 20 INJECTION, SOLUTION INTRAVENOUS at 05:29

## 2025-01-30 RX ADMIN — SODIUM CHLORIDE 8 MG/HR: 9 INJECTION, SOLUTION INTRAVENOUS at 09:36

## 2025-01-30 RX ADMIN — FENTANYL CITRATE 50 MCG: 50 INJECTION, SOLUTION INTRAMUSCULAR; INTRAVENOUS at 10:58

## 2025-01-30 RX ADMIN — POTASSIUM CHLORIDE 10 MEQ: 7.45 INJECTION INTRAVENOUS at 18:16

## 2025-01-30 RX ADMIN — FENTANYL CITRATE 25 MCG: 50 INJECTION, SOLUTION INTRAMUSCULAR; INTRAVENOUS at 11:11

## 2025-01-30 RX ADMIN — FENTANYL CITRATE 50 MCG: 50 INJECTION, SOLUTION INTRAMUSCULAR; INTRAVENOUS at 05:25

## 2025-01-30 RX ADMIN — PIPERACILLIN AND TAZOBACTAM 3375 MG: 3; .375 INJECTION, POWDER, LYOPHILIZED, FOR SOLUTION INTRAVENOUS at 14:38

## 2025-01-30 RX ADMIN — PROPOFOL 50 MG: 10 INJECTION, EMULSION INTRAVENOUS at 10:08

## 2025-01-30 RX ADMIN — DIBASIC SODIUM PHOSPHATE, MONOBASIC POTASSIUM PHOSPHATE AND MONOBASIC SODIUM PHOSPHATE 2 TABLET: 852; 155; 130 TABLET ORAL at 07:30

## 2025-01-30 ASSESSMENT — PAIN SCALES - GENERAL
PAINLEVEL_OUTOF10: 10
PAINLEVEL_OUTOF10: 4
PAINLEVEL_OUTOF10: 10
PAINLEVEL_OUTOF10: 7
PAINLEVEL_OUTOF10: 8
PAINLEVEL_OUTOF10: 3
PAINLEVEL_OUTOF10: 8
PAINLEVEL_OUTOF10: 10
PAINLEVEL_OUTOF10: 3
PAINLEVEL_OUTOF10: 10

## 2025-01-30 ASSESSMENT — PAIN DESCRIPTION - LOCATION
LOCATION: ABDOMEN
LOCATION: ABDOMEN;BACK
LOCATION: ABDOMEN
LOCATION: ABDOMEN

## 2025-01-30 ASSESSMENT — PAIN DESCRIPTION - DESCRIPTORS
DESCRIPTORS: PATIENT UNABLE TO DESCRIBE

## 2025-01-30 ASSESSMENT — PAIN - FUNCTIONAL ASSESSMENT: PAIN_FUNCTIONAL_ASSESSMENT: 0-10

## 2025-01-30 NOTE — BRIEF OP NOTE
Brief Postoperative Note      Patient: Christiano Rider  YOB: 1970  MRN: [id number]    Date of Procedure: 1/30/2025    Pre-Op Diagnosis Codes:      * Small bowel mass [K63.89]    Post-Op Diagnosis: Same       Procedure(s):  DIAGNOSTIC LAPAROSCOPY,  EXPLORATORY LAPAROTOMY  WITH SMALL BOWEL RESECTION    Surgeon(s):  Abhilash Barrera MD    Assistant:  Resident: Lori Mirza DO    Anesthesia: General    Estimated Blood Loss (mL): less than 100     Complications: None    Specimens:   ID Type Source Tests Collected by Time Destination   A : JEJUNUM Tissue Jejunum SURGICAL PATHOLOGY Abhilash Barrera MD 1/30/2025 1101        Implants:  * No implants in log *      Drains: * No LDAs found *    Findings:  Infection Present At Time Of Surgery (PATOS) (choose all levels that have infection present):  No infection present  Other Findings:   Unable to visualize small bowel mass on laparoscopy. Decision made to perform mini-laparotomy in order to run the small bowel  3 cm x 3 cm extraluminal small bowel polypoid mass located 40 cm distal to the ligament of treitz with intra-luminal component. Intra-luminal blood products distal to this mass  Small bowel resection with primary side to side anastomosis    Electronically signed by Lori Mirza DO on 1/30/2025 at 11:52 AM

## 2025-01-30 NOTE — PROGRESS NOTES
Pt admitted to unit, pt then instantly transported to IR for embolization of bleed. Vital signs stable. Report given to LISA Conway. Stanley gtt running.

## 2025-01-30 NOTE — PLAN OF CARE
Interventional radiology called and mentioned that they have not seen any active bleed.  Did notice enhancing mass with venous draining.  Recommending surgical consultation.  General surgery is consulted.  Taking patient has primary and planning on doing diagnostic laparoscopy/exploratory laparotomy.

## 2025-01-30 NOTE — ANESTHESIA PRE PROCEDURE
rare                                Counseling given: Not Answered      Vital Signs (Current):   Vitals:    01/30/25 0700 01/30/25 0745 01/30/25 0800 01/30/25 0812   BP: (!) 144/102   123/77   Pulse: (!) 113  (!) 122 (!) 114   Resp: (!) 9  16 12   Temp:  97 °F (36.1 °C)  98.6 °F (37 °C)   TempSrc:  Oral  Temporal   SpO2: 98%  99% 99%                                              BP Readings from Last 3 Encounters:   01/30/25 123/77   01/29/25 (!) 131/98   01/23/25 102/76       NPO Status: Time of last liquid consumption: 1300                        Time of last solid consumption: 1300                        Date of last liquid consumption: 01/27/25                        Date of last solid food consumption: 01/27/25    BMI:   Wt Readings from Last 3 Encounters:   01/27/25 82.6 kg (182 lb 1 oz)   01/23/25 83.5 kg (184 lb)   09/20/24 83.6 kg (184 lb 3.2 oz)     There is no height or weight on file to calculate BMI.    CBC:   Lab Results   Component Value Date/Time    WBC 14.0 01/30/2025 03:29 AM    RBC 2.41 01/30/2025 03:29 AM    HGB 7.3 01/30/2025 03:29 AM    HCT 21.2 01/30/2025 03:29 AM    MCV 88.0 01/30/2025 03:29 AM    RDW 13.2 01/30/2025 03:29 AM     01/30/2025 03:29 AM       CMP:   Lab Results   Component Value Date/Time     01/30/2025 03:29 AM    K 3.9 01/30/2025 03:29 AM     01/30/2025 03:29 AM    CO2 23 01/30/2025 03:29 AM    BUN 16 01/30/2025 03:29 AM    CREATININE 0.7 01/30/2025 03:29 AM    GFRAA >60 08/29/2021 06:10 PM    LABGLOM >90 01/30/2025 03:29 AM    GLUCOSE 136 01/30/2025 03:29 AM    CALCIUM 7.6 01/30/2025 03:29 AM    BILITOT 0.3 01/28/2025 07:33 AM    ALKPHOS 39 01/28/2025 07:33 AM    AST 10 01/28/2025 07:33 AM    ALT 9 01/28/2025 07:33 AM       POC Tests: No results for input(s): \"POCGLU\", \"POCNA\", \"POCK\", \"POCCL\", \"POCBUN\", \"POCHEMO\", \"POCHCT\" in the last 72 hours.    Coags:   Lab Results   Component Value Date/Time    PROTIME 14.7 01/30/2025 03:29 AM    INR 1.2 01/30/2025 03:29

## 2025-01-30 NOTE — FLOWSHEET NOTE
Trauma surgery team aware of saturated midline abdominal incision. En route to bedside.     Electronically signed by Radha Wilkinson RN on 1/30/2025 at 1:17 PM

## 2025-01-30 NOTE — PROGRESS NOTES
ICU PROGRESS NOTE        PATIENT NAME: Christiano Rider  MEDICAL RECORD NO. [id number]  DATE: 1/30/2025    HD: # 1      ACUTE DIAGNOSES/PLAN  Neuro:  GCS 15  Fentanyl 50 q2h PRN   CV  HR   -144  GI bleed  Pulm  On room air   GI/Nutrition  NPO for OR  GI bleed, melena and hematochezia  GI for EGD and Colonoscopy 1/29 hiatal hernia polyps of body and fundus of stomach no bleeding, small internal hemorrhoids, mild diverticulosis, melenic stool within the terminal ileum no bleeding identified  CTA 1//29 with active GI bleeding within loop of SB in right mid abd, small liver lesions suspicious for hemangiomas and hepatic cyst  IR for embolization 1/29 no extrav identified, jejunal uniform-enhancing mass with early venous draining  Protonix gtt  GI following  Renal/lytes  UOP 1,100cc  BUN/Cr: 16//0.7  Na/K: 136/3.9  Mag/Phos: 1.6/2.7  iCal: 1.08  LR 125cc/hr  Heme  DVT prophylaxis-On hold, will resume post op  Hgb: 7.3 (8.3, 7.0)  7.   Endocrine        1.  - not requiring  Musculoskeletal  Up as tolerated  Skin  Monitor post op incision  Micro  WBC 14 (8.9)  Family/dispo  Remain in ICU  Lines  PIV x3     CHECKLIST    CAM-ICU RASS: n/a  RESTRAINTS: n/a  IVF:   NUTRITION: NPO  ANTIBIOTICS: None  GI: protonix gtt  DVT: None  GLYCEMIC CONTROL: not required  HOB >45: n/a  MOBILITY: as tolerated  SBT: n/a  IS: Yes  Wound care: n/a    Chief Complaint: \"I feel okay, some abdominal pain\"    SUBJECTIVE    Christiano Rider is a 54 y.o. male with medical history of GERD, hiatal hernia who presents with one week of Melena and hematochezia. Underwent EGD and colonoscopy without bleed identified, CTA with SB active extrav, IR for embolization without active hemorrhage identified. There was a SM jejunal mass identified.     Patient seen and examined this AM, he feels well some abdominal pain controlled with pain medications. Denies nausea, chest pain, SOB. He states he had an episode of \"sharting\" this AM

## 2025-01-30 NOTE — PLAN OF CARE
Problem: Discharge Planning  Goal: Discharge to home or other facility with appropriate resources  Outcome: Progressing     Problem: Safety - Adult  Goal: Free from fall injury  Outcome: Progressing     Problem: Skin/Tissue Integrity  Goal: Skin integrity remains intact  Description: 1.  Monitor for areas of redness and/or skin breakdown  2.  Assess vascular access sites hourly  3.  Every 4-6 hours minimum:  Change oxygen saturation probe site  4.  Every 4-6 hours:  If on nasal continuous positive airway pressure, respiratory therapy assess nares and determine need for appliance change or resting period  Outcome: Progressing     Problem: Pain  Goal: Verbalizes/displays adequate comfort level or baseline comfort level  Outcome: Progressing

## 2025-01-30 NOTE — ACP (ADVANCE CARE PLANNING)
Advance Care Planning     Advance Care Planning Inpatient Note  [facility] Department    Today's Date: 1/30/2025  Unit: [facility] OR    Received request from HealthCare Provider. Upon review of chart and communication with care team, patient's decision making abilities are not in question. Patient was/were present in the room during visit.    Goals of ACP Conversation:  Discuss advance care planning documents    Health Care Decision Makers:      Primary Decision Maker: gee cuevas - Brother/Sister - [phone]    Secondary Decision Maker: aniket gardner - Brother/Sister - [phone]  Summary:  Completed New Documents    Advance Care Planning Documents (Patient Wishes):  Healthcare Power of /Advance Directive Appointment of Health Care Agent     Assessment:  Patient was informed prior to  visit that he will need to undergo surgery on \"a mass in his belly.\" Patient expressed feelings of worry, concern, and fear regarding his health and the upcoming surgery. Patient spoke of his children, significant other, and family members throughout encounter. Patient was tearful and shared about his george with  throughout encounter.    Interventions:  Provided education on documents for clarity and greater understanding  Discussed and provided education on state decision maker hierarchy  Assisted in the completion of documents according to patient's wishes at this time    Care Preferences Communicated:   No    Outcomes/Plan:  ACP Discussion: Completed  New advance directive completed.  Returned original document(s) to patient, as well as copies for distribution to appointed agents  Copy of advance directive given to staff to scan into medical record.    Electronically signed by GABRIELE Osuna on 1/30/2025 at 8:24 AM

## 2025-01-30 NOTE — PLAN OF CARE
Problem: Discharge Planning  Goal: Discharge to home or other facility with appropriate resources  Outcome: Progressing     Problem: Safety - Adult  Goal: Free from fall injury  Outcome: Progressing     Problem: Skin/Tissue Integrity  Goal: Skin integrity remains intact  Description: 1.  Monitor for areas of redness and/or skin breakdown  2.  Assess vascular access sites hourly  3.  Every 4-6 hours minimum:  Change oxygen saturation probe site  4.  Every 4-6 hours:  If on nasal continuous positive airway pressure, respiratory therapy assess nares and determine need for appliance change or resting period  Outcome: Progressing

## 2025-01-30 NOTE — CONSULTS
General Surgery  Consult    PATIENT NAME: Christiano Rider  AGE: 54 y.o.  MEDICAL RECORD NO. [id number]  DATE: 1/29/2025  SURGEON: Dr. Barrera  PRIMARY CARE PHYSICIAN: Elan Shields MD    Patient evaluated at the request of  Dr. Beavers  Reason for evaluation: GI bleed    Patient information was obtained from patient and past medical records.  History/Exam limitations: none.    IMPRESSION:     Patient Active Problem List   Diagnosis    Asthma    Discogenic syndrome, lumbar    Prepatellar bursitis    Tension headache    Reactive airway disease    Male pattern baldness    Hematuria    Prostatic congestion    Maxillary hyperplasia    Mandibular retrognathism    Right inguinal hernia    GERD (gastroesophageal reflux disease)    Vitamin D deficiency    Gastritis    Hiatal hernia    EE (eosinophilic esophagitis)    Polyp of rectum    Stress    Occupational exposure to chemicals    Esophageal stricture    Current use of proton pump inhibitor    Small intestinal hemorrhage    Hypokalemia    Acute blood loss anemia    Small bowel mass    GI bleed   GI bleed    PLAN:   Plan for OR for diagnostic laparoscopy, possible exploratory laparotomy, possible bowel resection, possible on table enteroscopy.  Written informed consent obtained. Risks, benefits, alternative discussed with the patient. Patient voices verbal understanding and is agreeable to proceed.  Questions answered to patient's satisfaction.  Will continue to follow.  Please reach out with any questions or concerns.  Remainder of care per primary team.    HISTORY:   History of Chief Complaint:    Christiano Rider is a 54 y.o. male with medical history of GERD, hiatal hernia who presents with one week of GI bleeding. Patient was seen at [facility] and then transferred to [facility] for GI workup. Patient underwent two EGDs with no signs of active bleeding. He also underwent a colonoscopy that showed poor perp with fresh blood, melenic stools and clots

## 2025-01-30 NOTE — FLOWSHEET NOTE
RN notified trauma surgery that patient is complaining of itchiness, rash on forearms and trunk noted. Dr. Welch at bedside to assess.    Electronically signed by Radha Wilkinson RN on 1/30/2025 at 2:45 PM]

## 2025-01-30 NOTE — BRIEF OP NOTE
Brief Postoperative Note    Christiano Rider  YOB: 1970  [id number]    Pre-operative Diagnosis: Small bowel GI bleed    Post-operative Diagnosis: Small bowel mass with AV shunting.    Procedure: Mesenteric angiography    Anesthesia: Moderate sedation    Surgeons/Assistants: Steve Adasm MD     Estimated Blood Loss: minimal    Specimens: were not obtained    Findings:     Unsuccessful right CFA access.    Successful U/S guided left CFA access.    Distal aortogram performed to evaluate iliac vessels showed stable dissection of the right common iliac artery but no other dissection or extravasation.  Superior mesenteric and jejunal branch angiography demonstrating an oblong, uniform-enhancing mass with early venous draining; no extravasation identified.  Arteriotomy closure of the left CFA with mynx device. It initially appeared to have held; however, shortly after releasing digital pressure, some swelling of the left groin was identified which was treated with more digital pressure and a Safeguard device.      Pt was hemodynamically stable throughout the procedure.      Recommend surgical consult.    Case discussed with [facility] shortly after 9 PM.      Electronically signed by Steve Adams MD on 1/29/2025 at 9:21 PM

## 2025-01-30 NOTE — PROGRESS NOTES
Spiritual Health History and Assessment/Progress Note  [facility]    (P) Spiritual/Emotional Needs, Advance Care Planning, Pre-Op, (P) Emotional distress,  ,      Name: Christiano Rider MRN: [id number]    Age: 54 y.o.     Sex: male   Language: English   Tenriism: Buddhism   GI bleed     Date: 1/30/2025            Total Time Calculated: (P) 123 min              Spiritual Assessment began in [facility] OR        Referral/Consult From: (P) Nurse   Encounter Overview/Reason: (P) Spiritual/Emotional Needs, Advance Care Planning, Pre-Op  Service Provided For: (P) Patient    Narrative:  visited with patient per nurse referral, indicating that patient will be undergoing surgery tomorrow to \"remove a mass in his belly.\" Patient indicated his desire to have his siblings named as his primary decision makers.  visited with patient and provided an active listening presence as he expressed feelings of worry, concern, and shared about his leonora.  assisted patient in completing Healthcare Power of  paperwork. Patient was tearful and receptive to 's support.     Leonora, Belief, Meaning:   Patient has beliefs or practices that help with coping during difficult times  Family/Friends No family/friends present      Importance and Influence:  Patient has spiritual/personal beliefs that influence decisions regarding their health  Family/Friends No family/friends present    Community:  Patient feels well-supported. Support system includes: Extended family  Family/Friends No family/friends present    Assessment and Plan of Care:     Patient Interventions include: Facilitated expression of thoughts and feelings, Explored spiritual coping/struggle/distress, Engaged in theological reflection, Affirmed coping skills/support systems, and Assisted in Advance Care Planning conversation  Family/Friends Interventions include: No family/friends present    Patient Plan of Care: Spiritual Care  available upon further referral  Family/Friends Plan of Care: Spiritual Care available upon further referral    Electronically signed by GABRIELE Osuna on 1/30/2025 at 8:19 AM

## 2025-01-30 NOTE — OP NOTE
Operative Note      Patient: Christiano Rider  YOB: 1970  MRN: [id number]    Date of Procedure: 1/30/2025    Pre-Op Diagnosis Codes:      * Small bowel mass [K63.89]    Post-Op Diagnosis: Same       Procedure(s):  DIAGNOSTIC LAPAROSCOPY,  EXPLORATORY LAPAROTOMY  WITH SMALL BOWEL RESECTION    Surgeon(s):  Abhilash Barrera MD    Assistant:   Physician Assistant: Caitlyn Benz PA-C  Resident: Lori Mirza DO    Anesthesia: General    Estimated Blood Loss (mL): less than 50     Complications: None    Specimens:   ID Type Source Tests Collected by Time Destination   A : JEJUNUM Tissue Jejunum SURGICAL PATHOLOGY Abhilash Barrera MD 1/30/2025 1101        Implants:  * No implants in log *      Drains: * No LDAs found *    Findings:  Infection Present At Time Of Surgery (PATOS) (choose all levels that have infection present):  No infection present  Other Findings:   Unable to visualize small bowel mass on laparoscopy. Decision made to perform mini-laparotomy in order to run the small bowel  3 cm x 3 cm extraluminal small bowel polypoid mass located 40 cm distal to the ligament of treitz with intra-luminal component. Intra-luminal blood products distal to this mass  Small bowel resection with primary side to side anastomosis    Detailed Description of Procedure:   Patient is a 54-year-old male who presents with GI bleed and concern for small bowel mass causing bleeding.  The risks and benefits of diagnostic laparoscopy, possible exploratory laparotomy, possible bowel resection, possible ostomy were discussed with the patient and all questions were answered.  Patient provided written consent to proceed to the operating room.    Patient was brought to the operating room and placed in the supine position.  General anesthesia was induced and endotracheal tube was used for airway control.  Ancef and Flagyl were administered for antimicrobial coverage.  Loya catheter was placed.  Timeout

## 2025-01-30 NOTE — PLAN OF CARE
GI plan of care:    Will defer further mgt to our surgical team  GI will sign off-please recall if we can be of service to ptRj Carrasco, CNP

## 2025-01-30 NOTE — PRE SEDATION
Sedation Pre-Procedure Note    Patient Name: Christiano Rider   YOB: 1970  Room/Bed: 1003/1003-01  Medical Record Number: [id number]  Date: 1/29/2025   Time: 9:05 PM       Indication:  GI bleed    Consent: I have discussed with the patient and/or the patient representative the indication, alternatives, and the possible risks and/or complications of the planned procedure and the anesthesia methods. The patient and/or patient representative appear to understand and agree to proceed.    Vital Signs:   Vitals:    01/29/25 2100   BP: 126/80   Pulse: 95   Resp: 13   SpO2:        Past Medical History:   has a past medical history of Asthma, Discogenic syndrome, lumbar, EE (eosinophilic esophagitis), Gastritis, GERD (gastroesophageal reflux disease), Hematuria, Hiatal hernia, Male pattern baldness, Mandibular retrognathism, Maxillary hyperplasia, Penile lesion, Prepatellar bursitis, Prostatic congestion, Reactive airway disease, and Tension headache.    Past Surgical History:   has a past surgical history that includes Upper gastrointestinal endoscopy; Colonoscopy; Inguinal hernia repair (Right, 12/19/2013); Nose surgery; Ankle fracture surgery; Mandible surgery; Appendectomy; Abdomen surgery; Upper gastrointestinal endoscopy (06/08/2016); Elbow surgery; Upper gastrointestinal endoscopy (02/13/2019); Upper gastrointestinal endoscopy (02/13/2019); Endoscopy, colon, diagnostic; Colonoscopy (N/A, 03/22/2021); Upper gastrointestinal endoscopy (N/A, 03/22/2021); Upper gastrointestinal endoscopy (04/04/2023); Upper gastrointestinal endoscopy (N/A, 1/28/2025); Colonoscopy (N/A, 1/29/2025); and Upper gastrointestinal endoscopy (N/A, 1/29/2025).    Medications:   Scheduled Meds:    sodium chloride flush  5-40 mL IntraVENous 2 times per day     Continuous Infusions:    sodium chloride      sodium chloride      pantoprazole (PROTONIX) 80 mg in sodium chloride 0.9 % 100 mL infusion       PRN Meds: midazolam, fentanNYL,

## 2025-01-30 NOTE — POST SEDATION
Sedation Post Procedure Note    Patient Name: Christiano Rider   YOB: 1970  Room/Bed: 1003/1003-01  Medical Record Number: [id number]  Date: 1/29/2025   Time: 9:50 PM         Physicians/Assistants: Steve Adams MD, MD    Procedure Performed:  Mesenteric angiogram    Post-Sedation Vital Signs:  Vitals:    01/29/25 2115   BP: 100/62   Pulse: 92   Resp: 12   SpO2: 98%      Vital signs were reviewed and were stable after the procedure (see flow sheet for vitals)            Post-Sedation Exam: Responding appropriately, breathing spontaneously           Complications: none    Electronically signed by Steve Adams MD on 1/29/2025 at 9:50 PM

## 2025-01-30 NOTE — PROGRESS NOTES
Writer took over from dayshift RN in IR suite.  Report done at bedside in IR and pt assessed prior to IR procedure.  RN monitored Pt throughout procedure without incident.  Pt transported back to SICU post procedure.      Electronically signed by Man Bedoya RN on 1/30/2025 at 6:40 AM

## 2025-01-30 NOTE — CONSULTS
Gastroenterology  Consultation Note     .      REASON FOR CONSULTATION: GI bleed, small bowel mass bleeding      HISTORY OF PRESENT ILLNESS:       This is a 54-year-old male with past medical history of GERD, hiatal hernia who presented to the hospital at [facility] with complaints of melena and hematochezia.  He underwent EGD and colonoscopy without definite identifiable source.  He then underwent a CT angio that showed active extravasation from a small bowel.  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    The patient denies any prior such events of GI bleed.  He denies use of alcohol or smoking.  He denies use of NSAIDs or anticoagulation.    Previous GI history:  CTA A/P 1/29/25:  IMPRESSION:  1. Active GI bleeding within a loop of small bowel in the right mid abdomen.  2. Swirling of the small bowel mesentery which could be related to  peristalsis, but a small bowel volvulus cannot be excluded. There is no  evidence of bowel obstruction.  3. 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  4. Small low-attenuation lesions in the liver with peripheral nodular  enhancement that likely represent hemangiomas and a small hepatic cyst.  5. Bilateral L5 spondylolysis with grade 1 anterolisthesis of L5 on S1.    Interventional radiology 1/30/2025:  Superior mesenteric and jejunal branch angiography demonstrating an oblong, uniform enhancing mass with early venous draining, no extravasation identified.    Colonoscopy 1/29/2025:  Findings   Small hemorrhoids, internal  Mild diverticulosis, left-sided  Fresh blood, melenic stools, and clots throughout the entire colon.  Melenic stool in the terminal ileum    EGD 1/29/2025:  FINDINGS  Irregular Z-line  A 2 cm hiatal hernia was noted on retroflexion.   Multiple  benign-appearing fundic gland polyps in the body and fundus of the stomach  Upper GI tract otherwise normal  No blood was noted anywhere in the upper GI tract    PAST MEDICAL HISTORY:  Past Medical History:   Diagnosis Date    Asthma     Discogenic syndrome, lumbar     EE (eosinophilic esophagitis) 02/13/2019    Gastritis     GERD (gastroesophageal reflux disease) 1/14/2015    Hematuria     Stress    Hiatal hernia     Male pattern baldness     Mandibular retrognathism     Maxillary hyperplasia     Penile lesion     Prepatellar bursitis     Right Knee    Prostatic congestion     Reactive airway disease     Tension headache      Past Surgical History:   Procedure Laterality Date    ABDOMEN SURGERY      ANKLE FRACTURE SURGERY      APPENDECTOMY      COLONOSCOPY      COLONOSCOPY N/A 03/22/2021    COLONOSCOPY POLYPECTOMY SNARE/COLD BIOPSY performed by Roxanne Ware MD at [facility] OR    COLONOSCOPY N/A 1/29/2025    COLONOSCOPY DIAGNOSTIC performed by Steve Rojas MD at [facility] OR    ELBOW SURGERY      ENDOSCOPY, COLON, DIAGNOSTIC      INGUINAL HERNIA REPAIR Right 12/19/2013    MANDIBLE SURGERY      NOSE SURGERY      UPPER GASTROINTESTINAL ENDOSCOPY      UPPER GASTROINTESTINAL ENDOSCOPY  06/08/2016    hiatal hernia; gastritis    UPPER GASTROINTESTINAL ENDOSCOPY  02/13/2019    eosinophilic esophagitis    UPPER GASTROINTESTINAL ENDOSCOPY  02/13/2019    EGD BIOPSY performed by Roxanne Ware MD at [facility] OR    UPPER GASTROINTESTINAL ENDOSCOPY N/A 03/22/2021    EGD BIOPSY performed by Roxanne Ware MD at [facility] OR    UPPER GASTROINTESTINAL ENDOSCOPY  04/04/2023     DR CASPER DIAMOND    UPPER GASTROINTESTINAL ENDOSCOPY N/A 1/28/2025    ESOPHAGOGASTRODUODENOSCOPY performed by Steve Rojas MD at [facility] OR    UPPER GASTROINTESTINAL ENDOSCOPY N/A 1/29/2025    ESOPHAGOGASTRODUODENOSCOPY performed by Steve Rojas MD at [facility] OR       ALLERGIES:  No Known Allergies    HOME MEDICATIONS:  Prior to Admission medications

## 2025-01-31 ENCOUNTER — APPOINTMENT (OUTPATIENT)
Dept: GENERAL RADIOLOGY | Age: 55
End: 2025-01-31
Attending: HOSPITALIST
Payer: COMMERCIAL

## 2025-01-31 LAB
ANION GAP SERPL CALCULATED.3IONS-SCNC: 6 MMOL/L (ref 9–16)
BASOPHILS # BLD: 0.04 K/UL (ref 0–0.2)
BASOPHILS NFR BLD: 0 % (ref 0–2)
BLOOD BANK BLOOD PRODUCT EXPIRATION DATE: NORMAL
BLOOD BANK BLOOD PRODUCT EXPIRATION DATE: NORMAL
BLOOD BANK DISPENSE STATUS: NORMAL
BLOOD BANK DISPENSE STATUS: NORMAL
BLOOD BANK ISBT PRODUCT BLOOD TYPE: 5100
BLOOD BANK ISBT PRODUCT BLOOD TYPE: 6200
BLOOD BANK PRODUCT CODE: NORMAL
BLOOD BANK PRODUCT CODE: NORMAL
BLOOD BANK UNIT TYPE AND RH: NORMAL
BLOOD BANK UNIT TYPE AND RH: NORMAL
BPU ID: NORMAL
BPU ID: NORMAL
BUN SERPL-MCNC: 13 MG/DL (ref 6–20)
CA-I BLD-SCNC: 1.1 MMOL/L (ref 1.13–1.33)
CALCIUM SERPL-MCNC: 7.6 MG/DL (ref 8.6–10.4)
CHLORIDE SERPL-SCNC: 105 MMOL/L (ref 98–107)
CO2 SERPL-SCNC: 25 MMOL/L (ref 20–31)
COMPONENT: NORMAL
COMPONENT: NORMAL
CREAT SERPL-MCNC: 0.8 MG/DL (ref 0.7–1.2)
EOSINOPHIL # BLD: 0.25 K/UL (ref 0–0.44)
EOSINOPHILS RELATIVE PERCENT: 2 % (ref 1–4)
ERYTHROCYTE [DISTWIDTH] IN BLOOD BY AUTOMATED COUNT: 14.4 % (ref 11.8–14.4)
GFR, ESTIMATED: >90 ML/MIN/1.73M2
GLUCOSE SERPL-MCNC: 114 MG/DL (ref 74–99)
HCT VFR BLD AUTO: 26.6 % (ref 40.7–50.3)
HGB BLD-MCNC: 8.8 G/DL (ref 13–17)
IMM GRANULOCYTES # BLD AUTO: 0.09 K/UL (ref 0–0.3)
IMM GRANULOCYTES NFR BLD: 1 %
LYMPHOCYTES NFR BLD: 1.69 K/UL (ref 1.1–3.7)
LYMPHOCYTES RELATIVE PERCENT: 13 % (ref 24–43)
MAGNESIUM SERPL-MCNC: 1.9 MG/DL (ref 1.6–2.6)
MCH RBC QN AUTO: 30.3 PG (ref 25.2–33.5)
MCHC RBC AUTO-ENTMCNC: 33.1 G/DL (ref 28.4–34.8)
MCV RBC AUTO: 91.7 FL (ref 82.6–102.9)
MONOCYTES NFR BLD: 0.89 K/UL (ref 0.1–1.2)
MONOCYTES NFR BLD: 7 % (ref 3–12)
NEUTROPHILS NFR BLD: 77 % (ref 36–65)
NEUTS SEG NFR BLD: 10.6 K/UL (ref 1.5–8.1)
NRBC BLD-RTO: 0 PER 100 WBC
PHOSPHATE SERPL-MCNC: 2.6 MG/DL (ref 2.5–4.5)
PLATELET # BLD AUTO: 195 K/UL (ref 138–453)
PMV BLD AUTO: 9.9 FL (ref 8.1–13.5)
POTASSIUM SERPL-SCNC: 3.9 MMOL/L (ref 3.7–5.3)
RBC # BLD AUTO: 2.9 M/UL (ref 4.21–5.77)
SODIUM SERPL-SCNC: 136 MMOL/L (ref 136–145)
TRANSFUSION STATUS: NORMAL
TRANSFUSION STATUS: NORMAL
UNIT DIVISION: 0
UNIT DIVISION: 0
UNIT ISSUE DATE/TIME: NORMAL
UNIT ISSUE DATE/TIME: NORMAL
WBC OTHER # BLD: 13.6 K/UL (ref 3.5–11.3)

## 2025-01-31 PROCEDURE — 80048 BASIC METABOLIC PNL TOTAL CA: CPT

## 2025-01-31 PROCEDURE — 94761 N-INVAS EAR/PLS OXIMETRY MLT: CPT

## 2025-01-31 PROCEDURE — 6360000002 HC RX W HCPCS: Performed by: STUDENT IN AN ORGANIZED HEALTH CARE EDUCATION/TRAINING PROGRAM

## 2025-01-31 PROCEDURE — 82330 ASSAY OF CALCIUM: CPT

## 2025-01-31 PROCEDURE — 2500000003 HC RX 250 WO HCPCS

## 2025-01-31 PROCEDURE — 2700000000 HC OXYGEN THERAPY PER DAY

## 2025-01-31 PROCEDURE — 6370000000 HC RX 637 (ALT 250 FOR IP)

## 2025-01-31 PROCEDURE — 94664 DEMO&/EVAL PT USE INHALER: CPT

## 2025-01-31 PROCEDURE — 74018 RADEX ABDOMEN 1 VIEW: CPT

## 2025-01-31 PROCEDURE — 2580000003 HC RX 258

## 2025-01-31 PROCEDURE — 84100 ASSAY OF PHOSPHORUS: CPT

## 2025-01-31 PROCEDURE — 2580000003 HC RX 258: Performed by: STUDENT IN AN ORGANIZED HEALTH CARE EDUCATION/TRAINING PROGRAM

## 2025-01-31 PROCEDURE — 99024 POSTOP FOLLOW-UP VISIT: CPT | Performed by: SURGERY

## 2025-01-31 PROCEDURE — 94640 AIRWAY INHALATION TREATMENT: CPT

## 2025-01-31 PROCEDURE — 6360000002 HC RX W HCPCS

## 2025-01-31 PROCEDURE — 6360000002 HC RX W HCPCS: Performed by: NURSE PRACTITIONER

## 2025-01-31 PROCEDURE — 2060000000 HC ICU INTERMEDIATE R&B

## 2025-01-31 PROCEDURE — 83735 ASSAY OF MAGNESIUM: CPT

## 2025-01-31 PROCEDURE — 85025 COMPLETE CBC W/AUTO DIFF WBC: CPT

## 2025-01-31 PROCEDURE — 36415 COLL VENOUS BLD VENIPUNCTURE: CPT

## 2025-01-31 PROCEDURE — 2500000003 HC RX 250 WO HCPCS: Performed by: STUDENT IN AN ORGANIZED HEALTH CARE EDUCATION/TRAINING PROGRAM

## 2025-01-31 RX ORDER — MAGNESIUM SULFATE 1 G/100ML
1000 INJECTION INTRAVENOUS ONCE
Status: COMPLETED | OUTPATIENT
Start: 2025-01-31 | End: 2025-01-31

## 2025-01-31 RX ORDER — PROCHLORPERAZINE EDISYLATE 5 MG/ML
10 INJECTION INTRAMUSCULAR; INTRAVENOUS ONCE
Status: COMPLETED | OUTPATIENT
Start: 2025-01-31 | End: 2025-01-31

## 2025-01-31 RX ORDER — KETOROLAC TROMETHAMINE 15 MG/ML
15 INJECTION, SOLUTION INTRAMUSCULAR; INTRAVENOUS EVERY 6 HOURS
Status: DISCONTINUED | OUTPATIENT
Start: 2025-01-31 | End: 2025-02-04

## 2025-01-31 RX ORDER — SCOPOLAMINE 1 MG/3D
1 PATCH, EXTENDED RELEASE TRANSDERMAL
Status: DISCONTINUED | OUTPATIENT
Start: 2025-01-31 | End: 2025-02-07 | Stop reason: HOSPADM

## 2025-01-31 RX ORDER — BUDESONIDE AND FORMOTEROL FUMARATE DIHYDRATE 160; 4.5 UG/1; UG/1
2 AEROSOL RESPIRATORY (INHALATION)
Status: DISCONTINUED | OUTPATIENT
Start: 2025-01-31 | End: 2025-02-07 | Stop reason: HOSPADM

## 2025-01-31 RX ORDER — CALCIUM GLUCONATE 20 MG/ML
2000 INJECTION, SOLUTION INTRAVENOUS ONCE
Status: COMPLETED | OUTPATIENT
Start: 2025-01-31 | End: 2025-01-31

## 2025-01-31 RX ORDER — DEXTROSE MONOHYDRATE, SODIUM CHLORIDE, AND POTASSIUM CHLORIDE 50; 1.49; 4.5 G/1000ML; G/1000ML; G/1000ML
INJECTION, SOLUTION INTRAVENOUS CONTINUOUS
Status: DISCONTINUED | OUTPATIENT
Start: 2025-01-31 | End: 2025-02-02

## 2025-01-31 RX ORDER — ENOXAPARIN SODIUM 100 MG/ML
40 INJECTION SUBCUTANEOUS DAILY
Status: DISCONTINUED | OUTPATIENT
Start: 2025-01-31 | End: 2025-02-07 | Stop reason: HOSPADM

## 2025-01-31 RX ADMIN — BENZOCAINE AND MENTHOL 1 LOZENGE: 15; 3.6 LOZENGE ORAL at 21:06

## 2025-01-31 RX ADMIN — PROCHLORPERAZINE EDISYLATE 10 MG: 5 INJECTION INTRAMUSCULAR; INTRAVENOUS at 15:32

## 2025-01-31 RX ADMIN — SODIUM PHOSPHATE, MONOBASIC, MONOHYDRATE AND SODIUM PHOSPHATE, DIBASIC, ANHYDROUS 10 MMOL: 142; 276 INJECTION, SOLUTION INTRAVENOUS at 11:40

## 2025-01-31 RX ADMIN — PIPERACILLIN AND TAZOBACTAM 3375 MG: 3; .375 INJECTION, POWDER, LYOPHILIZED, FOR SOLUTION INTRAVENOUS at 08:20

## 2025-01-31 RX ADMIN — MAGNESIUM SULFATE HEPTAHYDRATE 1000 MG: 1 INJECTION, SOLUTION INTRAVENOUS at 09:29

## 2025-01-31 RX ADMIN — CALCIUM GLUCONATE 2000 MG: 20 INJECTION, SOLUTION INTRAVENOUS at 09:32

## 2025-01-31 RX ADMIN — SODIUM CHLORIDE, PRESERVATIVE FREE 10 ML: 5 INJECTION INTRAVENOUS at 21:03

## 2025-01-31 RX ADMIN — ONDANSETRON 4 MG: 2 INJECTION INTRAMUSCULAR; INTRAVENOUS at 05:06

## 2025-01-31 RX ADMIN — BENZOCAINE AND MENTHOL 1 LOZENGE: 15; 3.6 LOZENGE ORAL at 15:31

## 2025-01-31 RX ADMIN — KETOROLAC TROMETHAMINE 15 MG: 15 INJECTION, SOLUTION INTRAMUSCULAR; INTRAVENOUS at 15:32

## 2025-01-31 RX ADMIN — POTASSIUM CHLORIDE, DEXTROSE MONOHYDRATE AND SODIUM CHLORIDE: 150; 5; 450 INJECTION, SOLUTION INTRAVENOUS at 18:56

## 2025-01-31 RX ADMIN — BENZOCAINE AND MENTHOL 1 LOZENGE: 15; 3.6 LOZENGE ORAL at 18:03

## 2025-01-31 RX ADMIN — SODIUM CHLORIDE, PRESERVATIVE FREE 40 MG: 5 INJECTION INTRAVENOUS at 12:35

## 2025-01-31 RX ADMIN — BUDESONIDE AND FORMOTEROL FUMARATE DIHYDRATE 2 PUFF: 160; 4.5 AEROSOL RESPIRATORY (INHALATION) at 08:35

## 2025-01-31 RX ADMIN — ENOXAPARIN SODIUM 40 MG: 100 INJECTION SUBCUTANEOUS at 15:32

## 2025-01-31 RX ADMIN — SODIUM CHLORIDE, PRESERVATIVE FREE 40 MG: 5 INJECTION INTRAVENOUS at 02:18

## 2025-01-31 RX ADMIN — MAGNESIUM SULFATE HEPTAHYDRATE 1000 MG: 1 INJECTION, SOLUTION INTRAVENOUS at 08:23

## 2025-01-31 RX ADMIN — KETOROLAC TROMETHAMINE 15 MG: 15 INJECTION, SOLUTION INTRAMUSCULAR; INTRAVENOUS at 21:03

## 2025-01-31 ASSESSMENT — PAIN SCALES - GENERAL
PAINLEVEL_OUTOF10: 5
PAINLEVEL_OUTOF10: 5
PAINLEVEL_OUTOF10: 7
PAINLEVEL_OUTOF10: 10
PAINLEVEL_OUTOF10: 8
PAINLEVEL_OUTOF10: 3

## 2025-01-31 ASSESSMENT — PAIN DESCRIPTION - DESCRIPTORS: DESCRIPTORS: DISCOMFORT

## 2025-01-31 ASSESSMENT — PAIN DESCRIPTION - LOCATION
LOCATION: THROAT
LOCATION: GENERALIZED;ABDOMEN
LOCATION: THROAT
LOCATION: OTHER (COMMENT)

## 2025-01-31 NOTE — PROGRESS NOTES
01/31/25 0835   Care Plan - Respiratory Goals   Achieves optimal ventilation and oxygenation Assess for changes in respiratory status;Assess for changes in mentation and behavior;Position to facilitate oxygenation and minimize respiratory effort;Oxygen supplementation based on oxygen saturation or arterial blood gases;Encourage broncho-pulmonary hygiene including cough, deep breathe, incentive spirometry;Assess the need for suctioning and aspirate as needed;Assess and instruct to report shortness of breath or any respiratory difficulty;Respiratory therapy support as indicated

## 2025-01-31 NOTE — CARE COORDINATION
01/31/25 0838   Readmission Assessment   Number of Days since last admission? 1-7 days   Previous Disposition Other (comment)  (Transfer from [facility])   Who is being Interviewed Patient   What was the patient's/caregiver's perception as to why they think they needed to return back to the hospital? Other (Comment)  (Transfer from [facility])   Did you visit your Primary Care Physician after you left the hospital, before you returned this time? No   Why weren't you able to visit your PCP? Other (Comment)  (Transfer from [facility])   Did you see a specialist, such as Cardiac, Pulmonary, Orthopedic Physician, etc. after you left the hospital? No   Who advised the patient to return to the hospital? Physician   Does the patient report anything that got in the way of taking their medications? No   In our efforts to provide the best possible care to you and others like you, can you think of anything that we could have done to help you after you left the hospital the first time, so that you might not have needed to return so soon? Other (Comment)  (Transfer from [facility])

## 2025-01-31 NOTE — PROGRESS NOTES
0930: RN notified Dr. Robles regarding patient complaining of nausea. Scopolamine patch ordered and applied.     1200: Yaneth NP with trauma notified that patient is requesting Nexium for acid reflux. Per Yaneth, she will look into patient's chart.     1230: Yaneth NP gave verbal order to give IV protonix dose early.     1400: Patient's nausea still not improving. Dr. Parham notified. NG Tube placement ordered.     .Electronically signed by Neelam Young RN on 1/31/2025 at 6:05 PM

## 2025-01-31 NOTE — PROGRESS NOTES
ICU PROGRESS NOTE        PATIENT NAME: Christiano Rider  MEDICAL RECORD NO. [id number]  DATE: 1/31/2025    HD: # 2      ACUTE DIAGNOSES/PLAN  Neuro:  GCS 15  Fentanyl PCA for pain  CV  HR 80-100s  -160s  Pulm  Hx asthma  3 L nasal cannula   Home Advair ordered   Home med: Singulair held while NPO   GI/Nutrition  Hx GERD, hiatal hernia   GI Bleed 2/2 small bowel mass  POD #1 s/p ex lap with small bowel resection, side to side anastomosis   F/u Pathology   NPO with sips/chips  Awaiting return of bowel function   GI for EGD and Colonoscopy 1/29 hiatal hernia polyps of body and fundus of stomach no bleeding, small internal hemorrhoids, mild diverticulosis, melenic stool within the terminal ileum no bleeding identified  CTA 1/29 with active GI bleeding within loop of SB in right mid abd, small liver lesions suspicious for hemangiomas and hepatic cyst  IR for embolization 1/29 no extrav identified, jejunal uniform-enhancing mass with early venous draining  Protonix 40 BID   GI signed off 1/30  Renal/lytes  UOP 1,100cc  BUN/Cr: 13/0.8  Na/K: 136/3.9  Mag/Phos: 1.9/pending   iCal: pending   LR @ 125cc/hr  Heme  DVT prophylaxis-On hold, will resume post op  Hgb: 8.8 (9.2, 8.9, 7.3)  Plt: 195 (200)  Product: 3 RBC, 1 FFP, 1 Plt  7.   Endocrine        1. -140s  Musculoskeletal  Up as tolerated  Skin  Monitor post op incision  Rash  Received benadryl   Micro  WBC 13.6 (19.1)  Zosyn to end 1/31  Family/dispo  Remain in ICU  Lines  PIV x3     CHECKLIST    CAM-ICU RASS: n/a  RESTRAINTS: n/a  IVF:   NUTRITION: NPO w/ sips   ANTIBIOTICS: None  GI: protonix BID  DVT: None  GLYCEMIC CONTROL: not required  HOB >45: n/a  MOBILITY: as tolerated  SBT: n/a  IS: Yes  Wound care: n/a    Chief Complaint: \"I feel okay, some abdominal pain\"    SUBJECTIVE    Christiano Rider is a 54 y.o. male with medical history of GERD, hiatal hernia who presents with one week of Melena and hematochezia. Underwent EGD and colonoscopy  without bleed identified, CTA with SB active extrav, IR for embolization without active hemorrhage identified. There was a SM jejunal mass identified.     1/30: patient to OR for ex lap with bowel resection with side to side anastomosis, fentanyl PCA added for pain    This AM patient seen and examined at bedside. States he is still in a lot of pain despite the fentanyl PCA. States he feels very anxious this AM as well. Still awaiting bowel function, has not passed flatus but has had belching.        OBJECTIVE  VITALS:   Vitals:    01/31/25 0700   BP: 132/82   Pulse: 93   Resp: 15   Temp:    SpO2: 99%       Physical Exam  Constitutional:       General: He is not in acute distress.     Appearance: He is not ill-appearing or toxic-appearing.   HENT:      Head: Normocephalic and atraumatic.      Nose: Nose normal.      Mouth/Throat:      Mouth: Mucous membranes are moist.   Eyes:      Extraocular Movements: Extraocular movements intact.   Cardiovascular:      Rate and Rhythm: Tachycardia present.   Pulmonary:      Effort: Pulmonary effort is normal. No respiratory distress.   Abdominal:      General: There is no distension.      Palpations: Abdomen is soft.      Tenderness: There is abdominal tenderness.      Comments: Abdominal binder in place, incision CDI   Musculoskeletal:         General: Normal range of motion.   Skin:     General: Skin is warm and dry.   Neurological:      General: No focal deficit present.      Mental Status: He is alert and oriented to person, place, and time.   Psychiatric:         Mood and Affect: Mood normal.         Behavior: Behavior normal.       LAB:  CBC:   Recent Labs     01/30/25  0329 01/30/25  1034 01/30/25  1231 01/30/25  1638 01/31/25  0506   WBC 14.0*  --  19.1*  --  13.6*   HGB 7.3*   < > 8.9* 9.2* 8.8*   HCT 21.2*   < > 25.9* 27.0* 26.6*   MCV 88.0  --  88.7  --  91.7     --  200  --  195    < > = values in this interval not displayed.     BMP:   Recent Labs

## 2025-01-31 NOTE — PROGRESS NOTES
Physician Progress Note      PATIENT:               ALPHONSE CAMPBELL  [facility] #:                  [id number]  :                       1970  ADMIT DATE:       2025 6:43 PM  DISCH DATE:  RESPONDING  PROVIDER #:        ANDRE RENEE          QUERY TEXT:    Pt admitted with GIH and has anemia documented. If possible, please document   further specificity regarding the acuity and type of anemia:    The medical record reflects the following:  Risk Factors: GIH, jejunal mass    Clinical Indicators: Hgb 8.3 on admission down to 6.5.  GS progress note:   Evidence of continued bleeding. Hb 7.3 and more tachycardic this AM.  Plan for OR today  GI consult note: CT angio that showed active extravasation from a small bowel.   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    Treatment: Exploratory lap, Small bowel resection with primary side to side   anastomosis ICU    Thank-you,  Ally Haskins RN, CDS  Options provided:  -- Anemia due to acute blood loss  -- Anemia due to acute on chronic blood loss  -- Anemia due to postoperative blood loss  -- Anemia due to CKD  -- Other - I will add my own diagnosis  -- Disagree - Not applicable / Not valid  -- Disagree - Clinically unable to determine / Unknown  -- Refer to Clinical Documentation Reviewer    PROVIDER RESPONSE TEXT:    This patient has acute blood loss anemia.    Query created by: Ally Haskins on 2025 7:49 AM      Electronically signed by:  ANDRE RENEE 2025 7:56 AM

## 2025-01-31 NOTE — PROGRESS NOTES
Spiritual Health History and Assessment/Progress Note  [facility]    (P) Post-Procedural, Emotional distress,  ,      Name: Christiano Rider MRN: [id number]    Age: 54 y.o.     Sex: male   Language: English   Restoration: Sabianism   GI bleed     Date: 1/31/2025            Total Time Calculated: (P) 17 min              Spiritual Assessment continued in [facility] CAR 1- SICU        Referral/Consult From: (P) Nurse   Encounter Overview/Reason: (P) Post-Procedural  Service Provided For: (P) Patient    Narrative:  visited patient per nurse request, indicating that patient had just returned to his room post-surgery.  met with patient in room, who indicated that he continues to feel pain. Patient confirmed that his siblings were present with him this morning. Patient expressed gratitude for his nursing care and thanked  for continued support.     Leonora, Belief, Meaning:   Patient has beliefs or practices that help with coping during difficult times  Family/Friends No family/friends present      Importance and Influence:  Patient has spiritual/personal beliefs that influence decisions regarding their health  Family/Friends No family/friends present    Community:  Patient feels well-supported. Support system includes: Extended family  Family/Friends No family/friends present    Assessment and Plan of Care:     Patient Interventions include: Facilitated expression of thoughts and feelings, Explored spiritual coping/struggle/distress, Engaged in theological reflection, and Affirmed coping skills/support systems  Family/Friends Interventions include: No family/friends present    Patient Plan of Care: Spiritual Care available upon further referral  Family/Friends Plan of Care: Spiritual Care available upon further referral    Electronically signed by GABRIELE Osuna on 1/31/2025 at 7:22 AM

## 2025-01-31 NOTE — PLAN OF CARE
Problem: Discharge Planning  Goal: Discharge to home or other facility with appropriate resources  1/31/2025 0956 by Neelam Young RN  Outcome: Progressing  1/30/2025 2011 by Man Bedoya RN  Outcome: Progressing     Problem: Safety - Adult  Goal: Free from fall injury  1/31/2025 0956 by Neelam Young RN  Outcome: Progressing  1/30/2025 2011 by Man Bedoya RN  Outcome: Progressing     Problem: Skin/Tissue Integrity  Goal: Skin integrity remains intact  Description: 1.  Monitor for areas of redness and/or skin breakdown  2.  Assess vascular access sites hourly  3.  Every 4-6 hours minimum:  Change oxygen saturation probe site  4.  Every 4-6 hours:  If on nasal continuous positive airway pressure, respiratory therapy assess nares and determine need for appliance change or resting period  1/31/2025 0956 by Neelam Young RN  Outcome: Progressing  1/30/2025 2011 by Man Bedoya RN  Outcome: Progressing     Problem: Pain  Goal: Verbalizes/displays adequate comfort level or baseline comfort level  1/31/2025 0956 by Neelam Young RN  Outcome: Progressing  1/30/2025 2011 by Man Bedoya RN  Outcome: Progressing     Problem: ABCDS Injury Assessment  Goal: Absence of physical injury  Outcome: Progressing

## 2025-01-31 NOTE — PLAN OF CARE
Problem: Discharge Planning  Goal: Discharge to home or other facility with appropriate resources  1/30/2025 2011 by Man Bedoya RN  Outcome: Progressing  1/30/2025 1844 by Radha Wilkinson RN  Outcome: Progressing     Problem: Safety - Adult  Goal: Free from fall injury  1/30/2025 2011 by Man Bedoya RN  Outcome: Progressing  1/30/2025 1844 by Radha Wilkinson RN  Outcome: Progressing     Problem: Skin/Tissue Integrity  Goal: Skin integrity remains intact  Description: 1.  Monitor for areas of redness and/or skin breakdown  2.  Assess vascular access sites hourly  3.  Every 4-6 hours minimum:  Change oxygen saturation probe site  4.  Every 4-6 hours:  If on nasal continuous positive airway pressure, respiratory therapy assess nares and determine need for appliance change or resting period  1/30/2025 2011 by Man Bedoya RN  Outcome: Progressing  1/30/2025 1844 by Radha Wilkinson RN  Outcome: Progressing     Problem: Pain  Goal: Verbalizes/displays adequate comfort level or baseline comfort level  1/30/2025 2011 by Man Bedoya RN  Outcome: Progressing  1/30/2025 1844 by Radha Wilkinson RN  Outcome: Progressing

## 2025-01-31 NOTE — PROGRESS NOTES
Trauma/Surgical Critical Care Sign Out Note:       Code Status: Full Code    Mode of provider to provider communication:        [x] Via telephone   [] In person     Date and time of sign-out: 2025 5:10 PM     Criteria Met for Transfer:  [x]   Oxygen saturation is > 90% on FiO2< 50% (exceptions may be made for patient pathophysiology)    [x]   Vital signs remain at or near baseline without pharmaceutical adjuncts, blood products, or > 2L fluid bolus in the last 24 hours  [x]   No suspicion or evidence of a new untreated infection (confusion, cool or cyanotic extremities, poor capillary refill, metabolic acidosis, low urine output)  [x]   Stable GCS, seizures controlled, no invasive neurological monitoring   [x]   Altered mental status is stable and able to be safely managed outside the ICU  [x]   No deterioration in renal function in the last 24 hours (creatinine > 50% increase, new onset oliguria)  [x]   Patient care needs do not exceed the capabilities of the unit they are being transferred to (suctioning needs, glucose monitoring, neurological monitoring, neurovascular checks, vital sign monitoring, I&O monitoring, drain management  [x]   No longer requiring mechanical ventilation via endotracheal intubation and/or decreasing O2/CPAP requirements  [x]   No need for medications that cannot be administered outside the ICU  [x]   PIC score >6      Reason for ICU admission:  Acute hemorrhage with hypotension    ICU course summary:  Patient was admitted to ICU prior to OR for hypotension and hemorrhage, was taken to OR for mini ex lap SB resection and primary anastomosis and closure. Remained stable post op, pain controlled, NGT placed.     Procedures during ICU stay:  OR for mini ex lap SB resection and primary anastomosis and closure    Current vitals:  Temp: Temp: 97.7 °F (36.5 °C)Temp  Av.3 °F (36.8 °C)  Min: 97.7 °F (36.5 °C)  Max: 98.9 °F (37.2 °C) BP Systolic (24hrs), Av , Min:114 , Max:160

## 2025-01-31 NOTE — CARE COORDINATION
Case Management Assessment  Initial Evaluation    Date/Time of Evaluation: 1/31/2025 8:53 AM  Assessment Completed by: REGINALD LEY RN    If patient is discharged prior to next notation, then this note serves as note for discharge by case management.    Patient Name: Christiano Rider                   YOB: 1970  Diagnosis: GI bleed [K92.2]                   Date / Time: 1/29/2025  6:43 PM    Patient Admission Status: Inpatient   Readmission Risk (Low < 19, Mod (19-27), High > 27): Readmission Risk Score: 11.6    Current PCP: Elan Shields MD  PCP verified by CM? (P) Yes    Chart Reviewed: Yes      History Provided by: (P) Patient  Patient Orientation: (P) Alert and Oriented    Patient Cognition: (P) Alert    Hospitalization in the last 30 days (Readmission):  No    If yes, Readmission Assessment in CM Navigator will be completed.    Advance Directives:      Code Status: Full Code   Patient's Primary Decision Maker is: (P) Legal Next of Kin    Primary Decision Maker: mason,bryan - Brother/Sister - [phone]    Secondary Decision Maker: aniket gardner - Brother/Sister - [phone]    Discharge Planning:    Patient lives with: (P) Children, Spouse/Significant Other (Minor children, girlfriend) Type of Home: (P) House  Primary Care Giver: (P) Self  Patient Support Systems include: (P) None   Current Financial resources:    Current community resources:    Current services prior to admission: (P) None            Current DME:              Type of Home Care services:  (P) None    ADLS  Prior functional level: (P) Independent in ADLs/IADLs  Current functional level: (P) Independent in ADLs/IADLs    PT AM-PAC:   /24  OT AM-PAC:   /24    Family can provide assistance at DC: (P) No  Would you like Case Management to discuss the discharge plan with any other family members/significant others, and if so, who? (P) No  Plans to Return to Present Housing: (P) Yes  Other Identified  Issues/Barriers to RETURNING to current housing: GI bleed  Potential Assistance needed at discharge: (P) N/A            Potential DME:    Patient expects to discharge to: (P) House  Plan for transportation at discharge:      Financial    [place] / [place] / Product Type: *No Product type* /     Does insurance require precert for SNF: Yes    Potential assistance Purchasing Medications: (P) No  Meds-to-Beds request: Yes      [id number], [place] - [address] - P [phone] - F [phone]  [address]  [place] [address]  Phone: [phone] Fax: [phone]    [id number] - [place], [place] - [address] - P [phone] - F [phone]  [address]  [place] [address]  Phone: [phone] Fax: [phone]    [id number] - [place], [place] - [address] [place] [address] - P [phone] - F [phone]  [address] [place] [address]  [place] [address]  Phone: [phone] Fax: [phone]      Notes:    Factors facilitating achievement of predicted outcomes: Cooperative    Barriers to discharge: Anxiety    Additional Case Management Notes: Plan home. Transportation sibling.     Case Management Services Information Letter Provided [x]    The Plan for Transition of Care is related to the following treatment goals of GI bleed [K92.2]    IF APPLICABLE: The Patient and/or patient representative Christiano and his family were provided with a choice of provider and agrees with the discharge plan. Freedom of choice list with basic dialogue that supports the patient's individualized plan of care/goals and shares the quality data associated with the providers was provided to:     Patient Representative Name:       The Patient and/or Patient Representative Agree with the Discharge Plan?      REGINALD LEY RN  Case Management Department  Ph: [phone]

## 2025-02-01 LAB
ANION GAP SERPL CALCULATED.3IONS-SCNC: 7 MMOL/L (ref 9–16)
BASOPHILS # BLD: <0.03 K/UL (ref 0–0.2)
BASOPHILS NFR BLD: 0 % (ref 0–2)
BUN SERPL-MCNC: 14 MG/DL (ref 6–20)
CA-I BLD-SCNC: 1.1 MMOL/L (ref 1.13–1.33)
CALCIUM SERPL-MCNC: 7.8 MG/DL (ref 8.6–10.4)
CHLORIDE SERPL-SCNC: 104 MMOL/L (ref 98–107)
CO2 SERPL-SCNC: 24 MMOL/L (ref 20–31)
CREAT SERPL-MCNC: 0.8 MG/DL (ref 0.7–1.2)
EOSINOPHIL # BLD: 0.24 K/UL (ref 0–0.44)
EOSINOPHILS RELATIVE PERCENT: 2 % (ref 1–4)
ERYTHROCYTE [DISTWIDTH] IN BLOOD BY AUTOMATED COUNT: 14.8 % (ref 11.8–14.4)
GFR, ESTIMATED: >90 ML/MIN/1.73M2
GLUCOSE SERPL-MCNC: 154 MG/DL (ref 74–99)
HCT VFR BLD AUTO: 24.1 % (ref 40.7–50.3)
HGB BLD-MCNC: 8 G/DL (ref 13–17)
IMM GRANULOCYTES # BLD AUTO: 0.06 K/UL (ref 0–0.3)
IMM GRANULOCYTES NFR BLD: 1 %
LYMPHOCYTES NFR BLD: 1.3 K/UL (ref 1.1–3.7)
LYMPHOCYTES RELATIVE PERCENT: 11 % (ref 24–43)
MAGNESIUM SERPL-MCNC: 2.1 MG/DL (ref 1.6–2.6)
MCH RBC QN AUTO: 30.1 PG (ref 25.2–33.5)
MCHC RBC AUTO-ENTMCNC: 33.2 G/DL (ref 28.4–34.8)
MCV RBC AUTO: 90.6 FL (ref 82.6–102.9)
MONOCYTES NFR BLD: 0.81 K/UL (ref 0.1–1.2)
MONOCYTES NFR BLD: 7 % (ref 3–12)
NEUTROPHILS NFR BLD: 79 % (ref 36–65)
NEUTS SEG NFR BLD: 9.12 K/UL (ref 1.5–8.1)
NRBC BLD-RTO: 0 PER 100 WBC
PHOSPHATE SERPL-MCNC: 1.7 MG/DL (ref 2.5–4.5)
PLATELET # BLD AUTO: 236 K/UL (ref 138–453)
PMV BLD AUTO: 9.8 FL (ref 8.1–13.5)
POTASSIUM SERPL-SCNC: 3.7 MMOL/L (ref 3.7–5.3)
RBC # BLD AUTO: 2.66 M/UL (ref 4.21–5.77)
RBC # BLD: ABNORMAL 10*6/UL
SODIUM SERPL-SCNC: 135 MMOL/L (ref 136–145)
WBC OTHER # BLD: 11.6 K/UL (ref 3.5–11.3)

## 2025-02-01 PROCEDURE — 85025 COMPLETE CBC W/AUTO DIFF WBC: CPT

## 2025-02-01 PROCEDURE — 84100 ASSAY OF PHOSPHORUS: CPT

## 2025-02-01 PROCEDURE — 6360000002 HC RX W HCPCS

## 2025-02-01 PROCEDURE — 6360000002 HC RX W HCPCS: Performed by: STUDENT IN AN ORGANIZED HEALTH CARE EDUCATION/TRAINING PROGRAM

## 2025-02-01 PROCEDURE — 6370000000 HC RX 637 (ALT 250 FOR IP): Performed by: STUDENT IN AN ORGANIZED HEALTH CARE EDUCATION/TRAINING PROGRAM

## 2025-02-01 PROCEDURE — 6360000002 HC RX W HCPCS: Performed by: NURSE PRACTITIONER

## 2025-02-01 PROCEDURE — 99024 POSTOP FOLLOW-UP VISIT: CPT | Performed by: SURGERY

## 2025-02-01 PROCEDURE — 94761 N-INVAS EAR/PLS OXIMETRY MLT: CPT

## 2025-02-01 PROCEDURE — 83735 ASSAY OF MAGNESIUM: CPT

## 2025-02-01 PROCEDURE — 2500000003 HC RX 250 WO HCPCS: Performed by: STUDENT IN AN ORGANIZED HEALTH CARE EDUCATION/TRAINING PROGRAM

## 2025-02-01 PROCEDURE — 6370000000 HC RX 637 (ALT 250 FOR IP)

## 2025-02-01 PROCEDURE — 2060000000 HC ICU INTERMEDIATE R&B

## 2025-02-01 PROCEDURE — 2580000003 HC RX 258

## 2025-02-01 PROCEDURE — 80048 BASIC METABOLIC PNL TOTAL CA: CPT

## 2025-02-01 PROCEDURE — 94640 AIRWAY INHALATION TREATMENT: CPT

## 2025-02-01 PROCEDURE — 36415 COLL VENOUS BLD VENIPUNCTURE: CPT

## 2025-02-01 PROCEDURE — 82330 ASSAY OF CALCIUM: CPT

## 2025-02-01 RX ORDER — PROCHLORPERAZINE EDISYLATE 5 MG/ML
10 INJECTION INTRAMUSCULAR; INTRAVENOUS ONCE
Status: COMPLETED | OUTPATIENT
Start: 2025-02-01 | End: 2025-02-01

## 2025-02-01 RX ORDER — PROCHLORPERAZINE EDISYLATE 5 MG/ML
10 INJECTION INTRAMUSCULAR; INTRAVENOUS EVERY 6 HOURS PRN
Status: DISCONTINUED | OUTPATIENT
Start: 2025-02-01 | End: 2025-02-07 | Stop reason: HOSPADM

## 2025-02-01 RX ADMIN — POTASSIUM CHLORIDE, DEXTROSE MONOHYDRATE AND SODIUM CHLORIDE: 150; 5; 450 INJECTION, SOLUTION INTRAVENOUS at 02:47

## 2025-02-01 RX ADMIN — KETOROLAC TROMETHAMINE 15 MG: 15 INJECTION, SOLUTION INTRAMUSCULAR; INTRAVENOUS at 02:48

## 2025-02-01 RX ADMIN — POTASSIUM CHLORIDE, DEXTROSE MONOHYDRATE AND SODIUM CHLORIDE: 150; 5; 450 INJECTION, SOLUTION INTRAVENOUS at 11:10

## 2025-02-01 RX ADMIN — SODIUM CHLORIDE, PRESERVATIVE FREE 40 MG: 5 INJECTION INTRAVENOUS at 02:48

## 2025-02-01 RX ADMIN — SODIUM CHLORIDE, PRESERVATIVE FREE 40 MG: 5 INJECTION INTRAVENOUS at 12:40

## 2025-02-01 RX ADMIN — BUDESONIDE AND FORMOTEROL FUMARATE DIHYDRATE 2 PUFF: 160; 4.5 AEROSOL RESPIRATORY (INHALATION) at 07:27

## 2025-02-01 RX ADMIN — ONDANSETRON 4 MG: 2 INJECTION INTRAMUSCULAR; INTRAVENOUS at 08:28

## 2025-02-01 RX ADMIN — POTASSIUM CHLORIDE, DEXTROSE MONOHYDRATE AND SODIUM CHLORIDE: 150; 5; 450 INJECTION, SOLUTION INTRAVENOUS at 19:34

## 2025-02-01 RX ADMIN — HYDROMORPHONE HYDROCHLORIDE 0.25 MG: 1 INJECTION, SOLUTION INTRAMUSCULAR; INTRAVENOUS; SUBCUTANEOUS at 12:41

## 2025-02-01 RX ADMIN — ONDANSETRON 4 MG: 2 INJECTION INTRAMUSCULAR; INTRAVENOUS at 06:48

## 2025-02-01 RX ADMIN — BENZOCAINE AND MENTHOL 1 LOZENGE: 15; 3.6 LOZENGE ORAL at 02:54

## 2025-02-01 RX ADMIN — SODIUM CHLORIDE, PRESERVATIVE FREE 40 MG: 5 INJECTION INTRAVENOUS at 23:48

## 2025-02-01 RX ADMIN — KETOROLAC TROMETHAMINE 15 MG: 15 INJECTION, SOLUTION INTRAMUSCULAR; INTRAVENOUS at 19:34

## 2025-02-01 RX ADMIN — SODIUM CHLORIDE, PRESERVATIVE FREE 10 ML: 5 INJECTION INTRAVENOUS at 19:34

## 2025-02-01 RX ADMIN — PHENOL 1 SPRAY: 1.5 LIQUID ORAL at 12:01

## 2025-02-01 RX ADMIN — ONDANSETRON 4 MG: 2 INJECTION INTRAMUSCULAR; INTRAVENOUS at 19:34

## 2025-02-01 RX ADMIN — PROCHLORPERAZINE EDISYLATE 10 MG: 5 INJECTION INTRAMUSCULAR; INTRAVENOUS at 12:41

## 2025-02-01 RX ADMIN — PHENOL 1 SPRAY: 1.5 LIQUID ORAL at 19:38

## 2025-02-01 RX ADMIN — SODIUM CHLORIDE, PRESERVATIVE FREE 10 ML: 5 INJECTION INTRAVENOUS at 08:44

## 2025-02-01 RX ADMIN — PROCHLORPERAZINE EDISYLATE 10 MG: 5 INJECTION INTRAMUSCULAR; INTRAVENOUS at 22:42

## 2025-02-01 RX ADMIN — BENZOCAINE AND MENTHOL 1 LOZENGE: 15; 3.6 LOZENGE ORAL at 08:29

## 2025-02-01 RX ADMIN — KETOROLAC TROMETHAMINE 15 MG: 15 INJECTION, SOLUTION INTRAMUSCULAR; INTRAVENOUS at 08:32

## 2025-02-01 RX ADMIN — ENOXAPARIN SODIUM 40 MG: 100 INJECTION SUBCUTANEOUS at 08:45

## 2025-02-01 RX ADMIN — KETOROLAC TROMETHAMINE 15 MG: 15 INJECTION, SOLUTION INTRAMUSCULAR; INTRAVENOUS at 17:00

## 2025-02-01 ASSESSMENT — PAIN DESCRIPTION - LOCATION
LOCATION: ABDOMEN;THROAT
LOCATION: THROAT
LOCATION: ABDOMEN;THROAT
LOCATION: ABDOMEN
LOCATION: THROAT
LOCATION: THROAT
LOCATION: ABDOMEN;THROAT

## 2025-02-01 ASSESSMENT — PAIN DESCRIPTION - ORIENTATION: ORIENTATION: POSTERIOR

## 2025-02-01 ASSESSMENT — PAIN SCALES - GENERAL
PAINLEVEL_OUTOF10: 6
PAINLEVEL_OUTOF10: 5
PAINLEVEL_OUTOF10: 5
PAINLEVEL_OUTOF10: 4
PAINLEVEL_OUTOF10: 5
PAINLEVEL_OUTOF10: 3
PAINLEVEL_OUTOF10: 5
PAINLEVEL_OUTOF10: 4
PAINLEVEL_OUTOF10: 4
PAINLEVEL_OUTOF10: 6
PAINLEVEL_OUTOF10: 5

## 2025-02-01 ASSESSMENT — PAIN DESCRIPTION - DESCRIPTORS
DESCRIPTORS: DISCOMFORT

## 2025-02-01 NOTE — PROGRESS NOTES
PROGRESS NOTE        PATIENT NAME: Christiano Rider  MEDICAL RECORD NO. 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  Pain is much improved.  Requesting PCA be discontinued    Discontinue PCA.  Start Dilaudid panel as needed, Toradol scheduled  Antiemetics scheduled and as needed  NGT to LIWS  Await return of bowel function  Encourage OOB, ambulation, working with PT/OT, ambulate 3 times daily  Dressing changed today.  Wound care order placed     Chief Complaint: \"Pain is better\"    SUBJECTIVE    Patient seen examined at bedside, no overnight events.  Patient reports his pain is much improved and is requesting the PCA be discontinued.  He reports he still has some nausea but it is improved.  He has 600 cc of dark output from his NGT in the canister this morning.  Voiding appropriately.  No bowel function yet.  VSS, afebrile.      OBJECTIVE  VITALS:   Vitals:    02/01/25 0837   BP: 121/71   Pulse: (!) 102   Resp: 16   Temp: 98.3 °F (36.8 °C)   SpO2: 97%       Physical Exam  Constitutional:       General: He is not in acute distress.     Appearance: He is not ill-appearing or toxic-appearing.   HENT:      Head: Normocephalic and atraumatic.      Nose: Nose normal.      Mouth/Throat:      Mouth: Mucous membranes are moist.   Eyes:      Extraocular Movements: Extraocular movements intact.   Cardiovascular:      Rate and Rhythm: Tachycardia present.   Pulmonary:      Effort: Pulmonary effort is normal. No respiratory distress.   Abdominal:      General: There is no distension.      Palpations: Abdomen is soft.      Tenderness: There is abdominal tenderness.      Comments: Abdominal binder in place, incision CDI   Musculoskeletal:         General: Normal range of motion.   Skin:     General: Skin is warm and dry.   Neurological:

## 2025-02-01 NOTE — PROGRESS NOTES
Writer entered room to assess patient. Patient NG tube approximately 5 cm out of right nare. Provider notified, orders given to re advance NG with no need for repeat xray. Writer having trouble advance NG past 70 cm. Provider notified, awaiting response.

## 2025-02-01 NOTE — PLAN OF CARE
Problem: Discharge Planning  Goal: Discharge to home or other facility with appropriate resources  Outcome: Progressing     Problem: Safety - Adult  Goal: Free from fall injury  Outcome: Progressing     Problem: Skin/Tissue Integrity  Goal: Skin integrity remains intact  Description: 1.  Monitor for areas of redness and/or skin breakdown  2.  Assess vascular access sites hourly  3.  Every 4-6 hours minimum:  Change oxygen saturation probe site  4.  Every 4-6 hours:  If on nasal continuous positive airway pressure, respiratory therapy assess nares and determine need for appliance change or resting period  Outcome: Progressing     Problem: Pain  Goal: Verbalizes/displays adequate comfort level or baseline comfort level  Outcome: Progressing     Problem: ABCDS Injury Assessment  Goal: Absence of physical injury  Outcome: Progressing     Problem: Respiratory - Adult  Goal: Achieves optimal ventilation and oxygenation  Outcome: Progressing

## 2025-02-01 NOTE — PLAN OF CARE
Problem: Discharge Planning  Goal: Discharge to home or other facility with appropriate resources  2/1/2025 1404 by Huyen Guzman RN  Outcome: Progressing     Problem: Safety - Adult  Goal: Free from fall injury  2/1/2025 1404 by Huyen Guzman RN  Outcome: Progressing     Problem: Skin/Tissue Integrity  Goal: Skin integrity remains intact  Description: 1.  Monitor for areas of redness and/or skin breakdown  2.  Assess vascular access sites hourly  3.  Every 4-6 hours minimum:  Change oxygen saturation probe site  4.  Every 4-6 hours:  If on nasal continuous positive airway pressure, respiratory therapy assess nares and determine need for appliance change or resting period  2/1/2025 1404 by Huyen Guzman RN  Outcome: Progressing     Problem: Pain  Goal: Verbalizes/displays adequate comfort level or baseline comfort level  2/1/2025 1404 by Huyen Guzman RN  Outcome: Progressing     Problem: ABCDS Injury Assessment  Goal: Absence of physical injury  2/1/2025 1404 by Huyen Guzman RN  Outcome: Progressing

## 2025-02-02 ENCOUNTER — APPOINTMENT (OUTPATIENT)
Dept: CT IMAGING | Age: 55
End: 2025-02-02
Attending: HOSPITALIST
Payer: COMMERCIAL

## 2025-02-02 ENCOUNTER — APPOINTMENT (OUTPATIENT)
Dept: GENERAL RADIOLOGY | Age: 55
End: 2025-02-02
Attending: HOSPITALIST
Payer: COMMERCIAL

## 2025-02-02 ENCOUNTER — APPOINTMENT (OUTPATIENT)
Age: 55
End: 2025-02-02
Attending: HOSPITALIST
Payer: COMMERCIAL

## 2025-02-02 LAB
ANION GAP SERPL CALCULATED.3IONS-SCNC: 6 MMOL/L (ref 9–16)
ANION GAP SERPL CALCULATED.3IONS-SCNC: 7 MMOL/L (ref 9–16)
BASOPHILS # BLD: 0.06 K/UL (ref 0–0.2)
BASOPHILS # BLD: <0.03 K/UL (ref 0–0.2)
BASOPHILS NFR BLD: 0 % (ref 0–2)
BASOPHILS NFR BLD: 1 % (ref 0–2)
BUN SERPL-MCNC: 22 MG/DL (ref 6–20)
BUN SERPL-MCNC: 25 MG/DL (ref 6–20)
CA-I BLD-SCNC: 1.08 MMOL/L (ref 1.13–1.33)
CALCIUM SERPL-MCNC: 7.5 MG/DL (ref 8.6–10.4)
CALCIUM SERPL-MCNC: 7.6 MG/DL (ref 8.6–10.4)
CHLORIDE SERPL-SCNC: 108 MMOL/L (ref 98–107)
CHLORIDE SERPL-SCNC: 108 MMOL/L (ref 98–107)
CO2 SERPL-SCNC: 22 MMOL/L (ref 20–31)
CO2 SERPL-SCNC: 24 MMOL/L (ref 20–31)
CREAT SERPL-MCNC: 0.8 MG/DL (ref 0.7–1.2)
CREAT SERPL-MCNC: 0.9 MG/DL (ref 0.7–1.2)
EOSINOPHIL # BLD: 0.14 K/UL (ref 0–0.44)
EOSINOPHIL # BLD: 0.18 K/UL (ref 0–0.44)
EOSINOPHILS RELATIVE PERCENT: 1 % (ref 1–4)
EOSINOPHILS RELATIVE PERCENT: 2 % (ref 1–4)
ERYTHROCYTE [DISTWIDTH] IN BLOOD BY AUTOMATED COUNT: 15.1 % (ref 11.8–14.4)
ERYTHROCYTE [DISTWIDTH] IN BLOOD BY AUTOMATED COUNT: 15.4 % (ref 11.8–14.4)
FIBRINOGEN, FUNCTIONAL TEG: 25.9 MM (ref 15–32)
GFR, ESTIMATED: >90 ML/MIN/1.73M2
GFR, ESTIMATED: >90 ML/MIN/1.73M2
GLUCOSE SERPL-MCNC: 137 MG/DL (ref 74–99)
GLUCOSE SERPL-MCNC: 153 MG/DL (ref 74–99)
HCT VFR BLD AUTO: 14.9 % (ref 40.7–50.3)
HCT VFR BLD AUTO: 23.2 % (ref 40.7–50.3)
HGB BLD-MCNC: 4.8 G/DL (ref 13–17)
HGB BLD-MCNC: 7.5 G/DL (ref 13–17)
IMM GRANULOCYTES # BLD AUTO: 0.1 K/UL (ref 0–0.3)
IMM GRANULOCYTES # BLD AUTO: 0.1 K/UL (ref 0–0.3)
IMM GRANULOCYTES NFR BLD: 1 %
IMM GRANULOCYTES NFR BLD: 1 %
LY30 (LYSIS) TEG: 1.3 % (ref 0–2.6)
LYMPHOCYTES NFR BLD: 1.33 K/UL (ref 1.1–3.7)
LYMPHOCYTES NFR BLD: 1.38 K/UL (ref 1.1–3.7)
LYMPHOCYTES RELATIVE PERCENT: 13 % (ref 24–43)
LYMPHOCYTES RELATIVE PERCENT: 14 % (ref 24–43)
MA(MAX CLOT) RAPID TEG: 66.7 MM (ref 52–70)
MAGNESIUM SERPL-MCNC: 1.9 MG/DL (ref 1.6–2.6)
MCH RBC QN AUTO: 29.8 PG (ref 25.2–33.5)
MCH RBC QN AUTO: 30.4 PG (ref 25.2–33.5)
MCHC RBC AUTO-ENTMCNC: 32.2 G/DL (ref 28.4–34.8)
MCHC RBC AUTO-ENTMCNC: 32.3 G/DL (ref 28.4–34.8)
MCV RBC AUTO: 92.1 FL (ref 82.6–102.9)
MCV RBC AUTO: 94.3 FL (ref 82.6–102.9)
MONOCYTES NFR BLD: 0.64 K/UL (ref 0.1–1.2)
MONOCYTES NFR BLD: 0.75 K/UL (ref 0.1–1.2)
MONOCYTES NFR BLD: 7 % (ref 3–12)
MONOCYTES NFR BLD: 7 % (ref 3–12)
NEUTROPHILS NFR BLD: 76 % (ref 36–65)
NEUTROPHILS NFR BLD: 78 % (ref 36–65)
NEUTS SEG NFR BLD: 7.46 K/UL (ref 1.5–8.1)
NEUTS SEG NFR BLD: 8.18 K/UL (ref 1.5–8.1)
NRBC BLD-RTO: 0 PER 100 WBC
NRBC BLD-RTO: 0.2 PER 100 WBC
PHOSPHATE SERPL-MCNC: 2.8 MG/DL (ref 2.5–4.5)
PLATELET # BLD AUTO: 220 K/UL (ref 138–453)
PLATELET # BLD AUTO: 221 K/UL (ref 138–453)
PMV BLD AUTO: 10 FL (ref 8.1–13.5)
PMV BLD AUTO: 9.8 FL (ref 8.1–13.5)
POTASSIUM SERPL-SCNC: 4.1 MMOL/L (ref 3.7–5.3)
POTASSIUM SERPL-SCNC: 4.3 MMOL/L (ref 3.7–5.3)
RBC # BLD AUTO: 1.58 M/UL (ref 4.21–5.77)
RBC # BLD AUTO: 2.52 M/UL (ref 4.21–5.77)
RBC # BLD: ABNORMAL 10*6/UL
RBC # BLD: ABNORMAL 10*6/UL
REACTION TIME TEG: 6.2 MIN (ref 4.6–9.1)
SODIUM SERPL-SCNC: 137 MMOL/L (ref 136–145)
SODIUM SERPL-SCNC: 138 MMOL/L (ref 136–145)
TROPONIN I SERPL HS-MCNC: 8 NG/L (ref 0–22)
WBC OTHER # BLD: 10.6 K/UL (ref 3.5–11.3)
WBC OTHER # BLD: 9.8 K/UL (ref 3.5–11.3)

## 2025-02-02 PROCEDURE — 94640 AIRWAY INHALATION TREATMENT: CPT

## 2025-02-02 PROCEDURE — 82330 ASSAY OF CALCIUM: CPT

## 2025-02-02 PROCEDURE — 2500000003 HC RX 250 WO HCPCS: Performed by: STUDENT IN AN ORGANIZED HEALTH CARE EDUCATION/TRAINING PROGRAM

## 2025-02-02 PROCEDURE — 84484 ASSAY OF TROPONIN QUANT: CPT

## 2025-02-02 PROCEDURE — 2580000003 HC RX 258: Performed by: STUDENT IN AN ORGANIZED HEALTH CARE EDUCATION/TRAINING PROGRAM

## 2025-02-02 PROCEDURE — 84100 ASSAY OF PHOSPHORUS: CPT

## 2025-02-02 PROCEDURE — 85025 COMPLETE CBC W/AUTO DIFF WBC: CPT

## 2025-02-02 PROCEDURE — 85390 FIBRINOLYSINS SCREEN I&R: CPT

## 2025-02-02 PROCEDURE — 6360000002 HC RX W HCPCS: Performed by: STUDENT IN AN ORGANIZED HEALTH CARE EDUCATION/TRAINING PROGRAM

## 2025-02-02 PROCEDURE — 71045 X-RAY EXAM CHEST 1 VIEW: CPT

## 2025-02-02 PROCEDURE — 99024 POSTOP FOLLOW-UP VISIT: CPT | Performed by: SURGERY

## 2025-02-02 PROCEDURE — 6360000004 HC RX CONTRAST MEDICATION

## 2025-02-02 PROCEDURE — 6360000002 HC RX W HCPCS

## 2025-02-02 PROCEDURE — 93005 ELECTROCARDIOGRAM TRACING: CPT | Performed by: STUDENT IN AN ORGANIZED HEALTH CARE EDUCATION/TRAINING PROGRAM

## 2025-02-02 PROCEDURE — 2000000000 HC ICU R&B

## 2025-02-02 PROCEDURE — 85347 COAGULATION TIME ACTIVATED: CPT

## 2025-02-02 PROCEDURE — 36430 TRANSFUSION BLD/BLD COMPNT: CPT

## 2025-02-02 PROCEDURE — 85384 FIBRINOGEN ACTIVITY: CPT

## 2025-02-02 PROCEDURE — 2580000003 HC RX 258

## 2025-02-02 PROCEDURE — 6360000004 HC RX CONTRAST MEDICATION: Performed by: STUDENT IN AN ORGANIZED HEALTH CARE EDUCATION/TRAINING PROGRAM

## 2025-02-02 PROCEDURE — 74176 CT ABD & PELVIS W/O CONTRAST: CPT

## 2025-02-02 PROCEDURE — 85576 BLOOD PLATELET AGGREGATION: CPT

## 2025-02-02 PROCEDURE — 74174 CTA ABD&PLVS W/CONTRAST: CPT

## 2025-02-02 PROCEDURE — 80048 BASIC METABOLIC PNL TOTAL CA: CPT

## 2025-02-02 PROCEDURE — 6370000000 HC RX 637 (ALT 250 FOR IP)

## 2025-02-02 PROCEDURE — 83735 ASSAY OF MAGNESIUM: CPT

## 2025-02-02 PROCEDURE — P9016 RBC LEUKOCYTES REDUCED: HCPCS

## 2025-02-02 PROCEDURE — 6360000002 HC RX W HCPCS: Performed by: NURSE PRACTITIONER

## 2025-02-02 PROCEDURE — 94761 N-INVAS EAR/PLS OXIMETRY MLT: CPT

## 2025-02-02 PROCEDURE — 36415 COLL VENOUS BLD VENIPUNCTURE: CPT

## 2025-02-02 RX ORDER — SODIUM CHLORIDE, SODIUM LACTATE, POTASSIUM CHLORIDE, AND CALCIUM CHLORIDE .6; .31; .03; .02 G/100ML; G/100ML; G/100ML; G/100ML
1000 INJECTION, SOLUTION INTRAVENOUS ONCE
Status: COMPLETED | OUTPATIENT
Start: 2025-02-02 | End: 2025-02-02

## 2025-02-02 RX ORDER — SODIUM CHLORIDE 9 MG/ML
INJECTION, SOLUTION INTRAVENOUS PRN
Status: DISCONTINUED | OUTPATIENT
Start: 2025-02-02 | End: 2025-02-04

## 2025-02-02 RX ORDER — DEXTROSE MONOHYDRATE AND SODIUM CHLORIDE 5; .9 G/100ML; G/100ML
INJECTION, SOLUTION INTRAVENOUS CONTINUOUS
Status: DISCONTINUED | OUTPATIENT
Start: 2025-02-02 | End: 2025-02-03 | Stop reason: RX

## 2025-02-02 RX ORDER — 0.9 % SODIUM CHLORIDE 0.9 %
500 INTRAVENOUS SOLUTION INTRAVENOUS ONCE
Status: COMPLETED | OUTPATIENT
Start: 2025-02-02 | End: 2025-02-02

## 2025-02-02 RX ORDER — CALCIUM GLUCONATE 20 MG/ML
2000 INJECTION, SOLUTION INTRAVENOUS ONCE
Status: COMPLETED | OUTPATIENT
Start: 2025-02-02 | End: 2025-02-02

## 2025-02-02 RX ORDER — IOPAMIDOL 755 MG/ML
100 INJECTION, SOLUTION INTRAVASCULAR
Status: COMPLETED | OUTPATIENT
Start: 2025-02-02 | End: 2025-02-02

## 2025-02-02 RX ADMIN — DEXTROSE AND SODIUM CHLORIDE: 5; 900 INJECTION, SOLUTION INTRAVENOUS at 16:15

## 2025-02-02 RX ADMIN — HYDROMORPHONE HYDROCHLORIDE 0.5 MG: 1 INJECTION, SOLUTION INTRAMUSCULAR; INTRAVENOUS; SUBCUTANEOUS at 04:08

## 2025-02-02 RX ADMIN — PHENOL 1 SPRAY: 1.5 LIQUID ORAL at 21:24

## 2025-02-02 RX ADMIN — KETOROLAC TROMETHAMINE 15 MG: 15 INJECTION, SOLUTION INTRAMUSCULAR; INTRAVENOUS at 10:08

## 2025-02-02 RX ADMIN — IOHEXOL 50 ML: 240 INJECTION, SOLUTION INTRATHECAL; INTRAVASCULAR; INTRAVENOUS; ORAL at 21:05

## 2025-02-02 RX ADMIN — ONDANSETRON 4 MG: 2 INJECTION INTRAMUSCULAR; INTRAVENOUS at 20:20

## 2025-02-02 RX ADMIN — HYDROMORPHONE HYDROCHLORIDE 0.5 MG: 1 INJECTION, SOLUTION INTRAMUSCULAR; INTRAVENOUS; SUBCUTANEOUS at 15:13

## 2025-02-02 RX ADMIN — CALCIUM GLUCONATE 2000 MG: 20 INJECTION, SOLUTION INTRAVENOUS at 01:57

## 2025-02-02 RX ADMIN — ONDANSETRON 4 MG: 2 INJECTION INTRAMUSCULAR; INTRAVENOUS at 04:08

## 2025-02-02 RX ADMIN — PROCHLORPERAZINE EDISYLATE 10 MG: 5 INJECTION INTRAMUSCULAR; INTRAVENOUS at 15:13

## 2025-02-02 RX ADMIN — PHENOL 1 SPRAY: 1.5 LIQUID ORAL at 04:15

## 2025-02-02 RX ADMIN — PHENOL 1 SPRAY: 1.5 LIQUID ORAL at 13:55

## 2025-02-02 RX ADMIN — SODIUM CHLORIDE 500 ML: 9 INJECTION, SOLUTION INTRAVENOUS at 01:55

## 2025-02-02 RX ADMIN — SODIUM CHLORIDE, POTASSIUM CHLORIDE, SODIUM LACTATE AND CALCIUM CHLORIDE 1000 ML: 600; 310; 30; 20 INJECTION, SOLUTION INTRAVENOUS at 11:34

## 2025-02-02 RX ADMIN — BUDESONIDE AND FORMOTEROL FUMARATE DIHYDRATE 2 PUFF: 160; 4.5 AEROSOL RESPIRATORY (INHALATION) at 07:16

## 2025-02-02 RX ADMIN — HYDROMORPHONE HYDROCHLORIDE 0.5 MG: 1 INJECTION, SOLUTION INTRAMUSCULAR; INTRAVENOUS; SUBCUTANEOUS at 21:16

## 2025-02-02 RX ADMIN — PROCHLORPERAZINE EDISYLATE 10 MG: 5 INJECTION INTRAMUSCULAR; INTRAVENOUS at 21:16

## 2025-02-02 RX ADMIN — SODIUM CHLORIDE, PRESERVATIVE FREE 40 MG: 5 INJECTION INTRAVENOUS at 13:48

## 2025-02-02 RX ADMIN — SODIUM PHOSPHATE, MONOBASIC, MONOHYDRATE AND SODIUM PHOSPHATE, DIBASIC, ANHYDROUS 20 MMOL: 276; 142 INJECTION, SOLUTION INTRAVENOUS at 01:59

## 2025-02-02 RX ADMIN — IOPAMIDOL 100 ML: 755 INJECTION, SOLUTION INTRAVENOUS at 13:08

## 2025-02-02 RX ADMIN — ONDANSETRON 4 MG: 2 INJECTION INTRAMUSCULAR; INTRAVENOUS at 13:44

## 2025-02-02 RX ADMIN — BUDESONIDE AND FORMOTEROL FUMARATE DIHYDRATE 2 PUFF: 160; 4.5 AEROSOL RESPIRATORY (INHALATION) at 20:37

## 2025-02-02 ASSESSMENT — PAIN SCALES - GENERAL
PAINLEVEL_OUTOF10: 4
PAINLEVEL_OUTOF10: 7
PAINLEVEL_OUTOF10: 4
PAINLEVEL_OUTOF10: 7
PAINLEVEL_OUTOF10: 4
PAINLEVEL_OUTOF10: 7
PAINLEVEL_OUTOF10: 9

## 2025-02-02 ASSESSMENT — PAIN DESCRIPTION - LOCATION
LOCATION: ABDOMEN

## 2025-02-02 ASSESSMENT — PAIN DESCRIPTION - DESCRIPTORS
DESCRIPTORS: SORE;ACHING
DESCRIPTORS: SORE

## 2025-02-02 ASSESSMENT — PAIN DESCRIPTION - ORIENTATION: ORIENTATION: ANTERIOR

## 2025-02-02 NOTE — PROGRESS NOTES
Patient's blood transfusion has begun.  Pt tachycardic, afebrile. Pt educated on plan of care for today and messaged family members to update his transfer to TICU.    Once 15 minute observation is complete, writer will take pt to CT then to icu.  Report given to Frida.

## 2025-02-02 NOTE — PROGRESS NOTES
Writer brought pt to CT scan then to new room 1026.  Belongings brought with patient.  Blood still running, bolus complete

## 2025-02-02 NOTE — H&P
Please refer to Consult note from 1/29/25    Jared Smith D.O.  General Surgery PGY-1  [facility]

## 2025-02-02 NOTE — PLAN OF CARE
Problem: Respiratory - Adult  Goal: Achieves optimal ventilation and oxygenation  2/2/2025 0719 by Rebecca Ayers RCP  Outcome: Progressing  2/2/2025 0226 by Whit Garcia RN  Outcome: Progressing

## 2025-02-02 NOTE — PROGRESS NOTES
PROGRESS NOTE        PATIENT NAME: Christiano Rider  MEDICAL RECORD NO. 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  Improved exam  Pain: MMT  Antiemetics scheduled and as needed  NGT to LIWS  Await return of bowel function.    Encourage OOB, ambulation, working with PT/OT, ambulate 3 times daily  Dressing changed today.  Wound care order placed     Chief Complaint: \"Pain is better\"    SUBJECTIVE    Patient seen examined at bedside.  Per night, the patient was tachycardic but he reports this was when he was moving.  He reports his pain is better controlled.  Denies anxiety.  He reports he still has some nausea but it is improved.  He has 900 cc of dark output from his NGT in the canister this morning.  Voiding appropriately.  No bowel function yet.  VSS, afebrile.      OBJECTIVE  VITALS:   Vitals:    02/02/25 0804   BP: 115/71   Pulse: (!) 117   Resp: 16   Temp: 98.9 °F (37.2 °C)   SpO2: 95%       Physical Exam  Constitutional:       General: He is not in acute distress.     Appearance: He is not ill-appearing or toxic-appearing.   HENT:      Head: Normocephalic and atraumatic.      Nose: Nose normal.      Mouth/Throat:      Mouth: Mucous membranes are moist.   Eyes:      Extraocular Movements: Extraocular movements intact.   Cardiovascular:      Rate and Rhythm: Tachycardia present.   Pulmonary:      Effort: Pulmonary effort is normal. No respiratory distress.   Abdominal:      General: There is no distension.      Palpations: Abdomen is soft.      Tenderness: There is abdominal tenderness.      Comments: Abdominal binder in place, incision CDI   Musculoskeletal:         General: Normal range of motion.   Skin:     General: Skin is warm and dry.   Neurological:      General: No focal deficit present.      Mental Status: He is

## 2025-02-02 NOTE — PROGRESS NOTES
Writer met transferring RN at bedside, 1st unit of PRBC hanging, all questions answered and care handed off to writer.     Electronically signed by Frida Victor RN on 2/2/2025 at 6:52 PM

## 2025-02-02 NOTE — PLAN OF CARE
Problem: Discharge Planning  Goal: Discharge to home or other facility with appropriate resources  Outcome: Progressing  Flowsheets (Taken 2/2/2025 1400)  Discharge to home or other facility with appropriate resources:   Arrange for needed discharge resources and transportation as appropriate   Identify barriers to discharge with patient and caregiver     Problem: Safety - Adult  Goal: Free from fall injury  Outcome: Progressing     Problem: Skin/Tissue Integrity  Goal: Skin integrity remains intact  Description: 1.  Monitor for areas of redness and/or skin breakdown  2.  Assess vascular access sites hourly  3.  Every 4-6 hours minimum:  Change oxygen saturation probe site  4.  Every 4-6 hours:  If on nasal continuous positive airway pressure, respiratory therapy assess nares and determine need for appliance change or resting period  Outcome: Progressing  Flowsheets (Taken 2/2/2025 1400)  Skin Integrity Remains Intact:   Monitor for areas of redness and/or skin breakdown   Assess vascular access sites hourly     Problem: Pain  Goal: Verbalizes/displays adequate comfort level or baseline comfort level  Outcome: Progressing  Flowsheets (Taken 2/2/2025 1600)  Verbalizes/displays adequate comfort level or baseline comfort level:   Encourage patient to monitor pain and request assistance   Assess pain using appropriate pain scale     Problem: ABCDS Injury Assessment  Goal: Absence of physical injury  Outcome: Progressing     Problem: Respiratory - Adult  Goal: Achieves optimal ventilation and oxygenation  2/2/2025 1851 by Frida Victor, RN  Outcome: Progressing  Flowsheets (Taken 2/2/2025 1400)  Achieves optimal ventilation and oxygenation:   Assess for changes in respiratory status   Assess for changes in mentation and behavior  2/2/2025 0719 by Rebecca Ayers RCP  Outcome: Progressing

## 2025-02-03 LAB
ABO/RH: NORMAL
ANION GAP SERPL CALCULATED.3IONS-SCNC: 6 MMOL/L (ref 9–16)
ANTIBODY SCREEN: NEGATIVE
ARM BAND NUMBER: NORMAL
BASOPHILS # BLD: 0.04 K/UL (ref 0–0.2)
BASOPHILS NFR BLD: 1 % (ref 0–2)
BLOOD BANK BLOOD PRODUCT EXPIRATION DATE: NORMAL
BLOOD BANK DISPENSE STATUS: NORMAL
BLOOD BANK ISBT PRODUCT BLOOD TYPE: 5100
BLOOD BANK PRODUCT CODE: NORMAL
BLOOD BANK SAMPLE EXPIRATION: NORMAL
BLOOD BANK UNIT TYPE AND RH: NORMAL
BPU ID: NORMAL
BUN SERPL-MCNC: 20 MG/DL (ref 6–20)
CA-I BLD-SCNC: 1.1 MMOL/L (ref 1.13–1.33)
CALCIUM SERPL-MCNC: 7.7 MG/DL (ref 8.6–10.4)
CHLORIDE SERPL-SCNC: 109 MMOL/L (ref 98–107)
CO2 SERPL-SCNC: 24 MMOL/L (ref 20–31)
COMPONENT: NORMAL
CREAT SERPL-MCNC: 0.8 MG/DL (ref 0.7–1.2)
CROSSMATCH RESULT: NORMAL
EKG ATRIAL RATE: 113 BPM
EKG P AXIS: 34 DEGREES
EKG P-R INTERVAL: 120 MS
EKG Q-T INTERVAL: 326 MS
EKG QRS DURATION: 94 MS
EKG QTC CALCULATION (BAZETT): 447 MS
EKG R AXIS: 39 DEGREES
EKG T AXIS: 64 DEGREES
EKG VENTRICULAR RATE: 113 BPM
EOSINOPHIL # BLD: 0.3 K/UL (ref 0–0.44)
EOSINOPHILS RELATIVE PERCENT: 3 % (ref 1–4)
ERYTHROCYTE [DISTWIDTH] IN BLOOD BY AUTOMATED COUNT: 15.6 % (ref 11.8–14.4)
FIBRINOGEN, FUNCTIONAL TEG: 20.6 MM (ref 15–32)
FIBRINOGEN, FUNCTIONAL TEG: 21.2 MM (ref 15–32)
GFR, ESTIMATED: >90 ML/MIN/1.73M2
GLUCOSE SERPL-MCNC: 127 MG/DL (ref 74–99)
HCT VFR BLD AUTO: 20.8 % (ref 40.7–50.3)
HCT VFR BLD AUTO: 23.3 % (ref 40.7–50.3)
HCT VFR BLD AUTO: 23.6 % (ref 40.7–50.3)
HGB BLD-MCNC: 6.6 G/DL (ref 13–17)
HGB BLD-MCNC: 7.6 G/DL (ref 13–17)
HGB BLD-MCNC: 7.8 G/DL (ref 13–17)
IMM GRANULOCYTES # BLD AUTO: 0.1 K/UL (ref 0–0.3)
IMM GRANULOCYTES NFR BLD: 1 %
INR PPP: 1
LY30 (LYSIS) TEG: 0.4 % (ref 0–2.6)
LY30 (LYSIS) TEG: 0.8 % (ref 0–2.6)
LYMPHOCYTES NFR BLD: 1.31 K/UL (ref 1.1–3.7)
LYMPHOCYTES RELATIVE PERCENT: 15 % (ref 24–43)
MA(MAX CLOT) RAPID TEG: 64.2 MM (ref 52–70)
MA(MAX CLOT) RAPID TEG: 64.3 MM (ref 52–70)
MAGNESIUM SERPL-MCNC: 1.9 MG/DL (ref 1.6–2.6)
MCH RBC QN AUTO: 28.9 PG (ref 25.2–33.5)
MCHC RBC AUTO-ENTMCNC: 31.7 G/DL (ref 28.4–34.8)
MCV RBC AUTO: 91.2 FL (ref 82.6–102.9)
MONOCYTES NFR BLD: 0.66 K/UL (ref 0.1–1.2)
MONOCYTES NFR BLD: 8 % (ref 3–12)
NEUTROPHILS NFR BLD: 73 % (ref 36–65)
NEUTS SEG NFR BLD: 6.4 K/UL (ref 1.5–8.1)
NRBC BLD-RTO: 0 PER 100 WBC
PERFORMING LOCATION: ABNORMAL
PHOSPHATE SERPL-MCNC: 3.3 MG/DL (ref 2.5–4.5)
PLATELET # BLD AUTO: 200 K/UL (ref 138–453)
PMV BLD AUTO: 9.8 FL (ref 8.1–13.5)
POTASSIUM SERPL-SCNC: 4.1 MMOL/L (ref 3.7–5.3)
PROTHROMBIN TIME: 13.3 SEC (ref 11.7–14.9)
RBC # BLD AUTO: 2.28 M/UL (ref 4.21–5.77)
RBC # BLD: ABNORMAL 10*6/UL
REACTION TIME TEG: 3.2 MIN (ref 4.6–9.1)
REACTION TIME TEG: 6 MIN (ref 4.6–9.1)
SODIUM SERPL-SCNC: 139 MMOL/L (ref 136–145)
TRANSFUSION STATUS: NORMAL
UNIT DIVISION: 0
UNIT ISSUE DATE/TIME: NORMAL
WBC OTHER # BLD: 8.8 K/UL (ref 3.5–11.3)

## 2025-02-03 PROCEDURE — 97166 OT EVAL MOD COMPLEX 45 MIN: CPT

## 2025-02-03 PROCEDURE — 6370000000 HC RX 637 (ALT 250 FOR IP)

## 2025-02-03 PROCEDURE — 6360000002 HC RX W HCPCS: Performed by: STUDENT IN AN ORGANIZED HEALTH CARE EDUCATION/TRAINING PROGRAM

## 2025-02-03 PROCEDURE — 86901 BLOOD TYPING SEROLOGIC RH(D): CPT

## 2025-02-03 PROCEDURE — 84100 ASSAY OF PHOSPHORUS: CPT

## 2025-02-03 PROCEDURE — 36430 TRANSFUSION BLD/BLD COMPNT: CPT

## 2025-02-03 PROCEDURE — 36415 COLL VENOUS BLD VENIPUNCTURE: CPT

## 2025-02-03 PROCEDURE — 86850 RBC ANTIBODY SCREEN: CPT

## 2025-02-03 PROCEDURE — 85347 COAGULATION TIME ACTIVATED: CPT

## 2025-02-03 PROCEDURE — P9016 RBC LEUKOCYTES REDUCED: HCPCS

## 2025-02-03 PROCEDURE — 6360000002 HC RX W HCPCS: Performed by: NURSE PRACTITIONER

## 2025-02-03 PROCEDURE — 85390 FIBRINOLYSINS SCREEN I&R: CPT

## 2025-02-03 PROCEDURE — 85384 FIBRINOGEN ACTIVITY: CPT

## 2025-02-03 PROCEDURE — 85025 COMPLETE CBC W/AUTO DIFF WBC: CPT

## 2025-02-03 PROCEDURE — 2580000003 HC RX 258

## 2025-02-03 PROCEDURE — 97162 PT EVAL MOD COMPLEX 30 MIN: CPT

## 2025-02-03 PROCEDURE — 86900 BLOOD TYPING SEROLOGIC ABO: CPT

## 2025-02-03 PROCEDURE — 93010 ELECTROCARDIOGRAM REPORT: CPT | Performed by: INTERNAL MEDICINE

## 2025-02-03 PROCEDURE — 2500000003 HC RX 250 WO HCPCS: Performed by: STUDENT IN AN ORGANIZED HEALTH CARE EDUCATION/TRAINING PROGRAM

## 2025-02-03 PROCEDURE — 85014 HEMATOCRIT: CPT

## 2025-02-03 PROCEDURE — 97530 THERAPEUTIC ACTIVITIES: CPT

## 2025-02-03 PROCEDURE — 80048 BASIC METABOLIC PNL TOTAL CA: CPT

## 2025-02-03 PROCEDURE — 97535 SELF CARE MNGMENT TRAINING: CPT

## 2025-02-03 PROCEDURE — 6360000002 HC RX W HCPCS

## 2025-02-03 PROCEDURE — 2000000000 HC ICU R&B

## 2025-02-03 PROCEDURE — 85576 BLOOD PLATELET AGGREGATION: CPT

## 2025-02-03 PROCEDURE — 82330 ASSAY OF CALCIUM: CPT

## 2025-02-03 PROCEDURE — 6370000000 HC RX 637 (ALT 250 FOR IP): Performed by: NURSE PRACTITIONER

## 2025-02-03 PROCEDURE — 83735 ASSAY OF MAGNESIUM: CPT

## 2025-02-03 PROCEDURE — 99233 SBSQ HOSP IP/OBS HIGH 50: CPT | Performed by: SURGERY

## 2025-02-03 PROCEDURE — 94640 AIRWAY INHALATION TREATMENT: CPT

## 2025-02-03 PROCEDURE — 85610 PROTHROMBIN TIME: CPT

## 2025-02-03 PROCEDURE — 85018 HEMOGLOBIN: CPT

## 2025-02-03 PROCEDURE — 86923 COMPATIBILITY TEST ELECTRIC: CPT

## 2025-02-03 RX ORDER — CALCIUM GLUCONATE 20 MG/ML
2000 INJECTION, SOLUTION INTRAVENOUS ONCE
Status: COMPLETED | OUTPATIENT
Start: 2025-02-03 | End: 2025-02-03

## 2025-02-03 RX ORDER — ONDANSETRON 2 MG/ML
8 INJECTION INTRAMUSCULAR; INTRAVENOUS EVERY 6 HOURS
Status: DISCONTINUED | OUTPATIENT
Start: 2025-02-03 | End: 2025-02-07 | Stop reason: HOSPADM

## 2025-02-03 RX ORDER — GABAPENTIN 300 MG/1
300 CAPSULE ORAL 3 TIMES DAILY
Status: DISCONTINUED | OUTPATIENT
Start: 2025-02-03 | End: 2025-02-07 | Stop reason: HOSPADM

## 2025-02-03 RX ORDER — SODIUM CHLORIDE, SODIUM LACTATE, POTASSIUM CHLORIDE, CALCIUM CHLORIDE 600; 310; 30; 20 MG/100ML; MG/100ML; MG/100ML; MG/100ML
INJECTION, SOLUTION INTRAVENOUS CONTINUOUS
Status: DISCONTINUED | OUTPATIENT
Start: 2025-02-03 | End: 2025-02-03

## 2025-02-03 RX ORDER — OXYCODONE HCL 5 MG/5 ML
5 SOLUTION, ORAL ORAL EVERY 4 HOURS PRN
Status: DISCONTINUED | OUTPATIENT
Start: 2025-02-03 | End: 2025-02-07 | Stop reason: HOSPADM

## 2025-02-03 RX ORDER — METHOCARBAMOL 750 MG/1
750 TABLET, FILM COATED ORAL 4 TIMES DAILY
Status: DISCONTINUED | OUTPATIENT
Start: 2025-02-03 | End: 2025-02-07 | Stop reason: HOSPADM

## 2025-02-03 RX ORDER — SODIUM CHLORIDE 9 MG/ML
INJECTION, SOLUTION INTRAVENOUS PRN
Status: DISCONTINUED | OUTPATIENT
Start: 2025-02-03 | End: 2025-02-07 | Stop reason: HOSPADM

## 2025-02-03 RX ORDER — ACETAMINOPHEN 500 MG
1000 TABLET ORAL EVERY 6 HOURS
Status: DISCONTINUED | OUTPATIENT
Start: 2025-02-03 | End: 2025-02-07 | Stop reason: HOSPADM

## 2025-02-03 RX ORDER — MONTELUKAST SODIUM 10 MG/1
10 TABLET ORAL EVERY EVENING
Status: DISCONTINUED | OUTPATIENT
Start: 2025-02-03 | End: 2025-02-07 | Stop reason: HOSPADM

## 2025-02-03 RX ADMIN — ONDANSETRON 4 MG: 2 INJECTION INTRAMUSCULAR; INTRAVENOUS at 02:17

## 2025-02-03 RX ADMIN — BUDESONIDE AND FORMOTEROL FUMARATE DIHYDRATE 2 PUFF: 160; 4.5 AEROSOL RESPIRATORY (INHALATION) at 21:17

## 2025-02-03 RX ADMIN — ACETAMINOPHEN 1000 MG: 500 TABLET ORAL at 19:44

## 2025-02-03 RX ADMIN — PROCHLORPERAZINE EDISYLATE 10 MG: 5 INJECTION INTRAMUSCULAR; INTRAVENOUS at 11:13

## 2025-02-03 RX ADMIN — ONDANSETRON 4 MG: 2 INJECTION INTRAMUSCULAR; INTRAVENOUS at 08:31

## 2025-02-03 RX ADMIN — METHOCARBAMOL 750 MG: 750 TABLET ORAL at 17:39

## 2025-02-03 RX ADMIN — SODIUM CHLORIDE, PRESERVATIVE FREE 10 ML: 5 INJECTION INTRAVENOUS at 08:31

## 2025-02-03 RX ADMIN — METHOCARBAMOL 750 MG: 750 TABLET ORAL at 19:45

## 2025-02-03 RX ADMIN — SODIUM CHLORIDE, POTASSIUM CHLORIDE, SODIUM LACTATE AND CALCIUM CHLORIDE: 600; 310; 30; 20 INJECTION, SOLUTION INTRAVENOUS at 01:32

## 2025-02-03 RX ADMIN — SODIUM CHLORIDE, POTASSIUM CHLORIDE, SODIUM LACTATE AND CALCIUM CHLORIDE: 600; 310; 30; 20 INJECTION, SOLUTION INTRAVENOUS at 17:39

## 2025-02-03 RX ADMIN — SODIUM CHLORIDE, PRESERVATIVE FREE 40 MG: 5 INJECTION INTRAVENOUS at 01:12

## 2025-02-03 RX ADMIN — MONTELUKAST 10 MG: 10 TABLET, FILM COATED ORAL at 17:39

## 2025-02-03 RX ADMIN — GABAPENTIN 300 MG: 300 CAPSULE ORAL at 19:44

## 2025-02-03 RX ADMIN — ONDANSETRON 8 MG: 2 INJECTION INTRAMUSCULAR; INTRAVENOUS at 19:45

## 2025-02-03 RX ADMIN — CALCIUM GLUCONATE 2000 MG: 20 INJECTION, SOLUTION INTRAVENOUS at 09:42

## 2025-02-03 RX ADMIN — ACETAMINOPHEN 1000 MG: 500 TABLET ORAL at 13:53

## 2025-02-03 RX ADMIN — SODIUM CHLORIDE, POTASSIUM CHLORIDE, SODIUM LACTATE AND CALCIUM CHLORIDE: 600; 310; 30; 20 INJECTION, SOLUTION INTRAVENOUS at 09:36

## 2025-02-03 RX ADMIN — SODIUM CHLORIDE, PRESERVATIVE FREE 10 ML: 5 INJECTION INTRAVENOUS at 19:45

## 2025-02-03 RX ADMIN — ONDANSETRON 8 MG: 2 INJECTION INTRAMUSCULAR; INTRAVENOUS at 14:08

## 2025-02-03 RX ADMIN — METHOCARBAMOL 750 MG: 750 TABLET ORAL at 13:53

## 2025-02-03 RX ADMIN — HYDROMORPHONE HYDROCHLORIDE 0.5 MG: 1 INJECTION, SOLUTION INTRAMUSCULAR; INTRAVENOUS; SUBCUTANEOUS at 01:18

## 2025-02-03 RX ADMIN — SODIUM CHLORIDE, PRESERVATIVE FREE 40 MG: 5 INJECTION INTRAVENOUS at 14:07

## 2025-02-03 RX ADMIN — GABAPENTIN 300 MG: 300 CAPSULE ORAL at 13:53

## 2025-02-03 RX ADMIN — PHENOL 1 SPRAY: 1.5 LIQUID ORAL at 08:32

## 2025-02-03 RX ADMIN — HYDROMORPHONE HYDROCHLORIDE 0.5 MG: 1 INJECTION, SOLUTION INTRAMUSCULAR; INTRAVENOUS; SUBCUTANEOUS at 11:13

## 2025-02-03 RX ADMIN — HYDROMORPHONE HYDROCHLORIDE 0.5 MG: 1 INJECTION, SOLUTION INTRAMUSCULAR; INTRAVENOUS; SUBCUTANEOUS at 04:08

## 2025-02-03 RX ADMIN — PROCHLORPERAZINE EDISYLATE 10 MG: 5 INJECTION INTRAMUSCULAR; INTRAVENOUS at 04:08

## 2025-02-03 ASSESSMENT — PAIN SCALES - GENERAL
PAINLEVEL_OUTOF10: 3
PAINLEVEL_OUTOF10: 7
PAINLEVEL_OUTOF10: 7
PAINLEVEL_OUTOF10: 8
PAINLEVEL_OUTOF10: 7
PAINLEVEL_OUTOF10: 3
PAINLEVEL_OUTOF10: 4
PAINLEVEL_OUTOF10: 5

## 2025-02-03 ASSESSMENT — PAIN - FUNCTIONAL ASSESSMENT
PAIN_FUNCTIONAL_ASSESSMENT: ACTIVITIES ARE NOT PREVENTED
PAIN_FUNCTIONAL_ASSESSMENT: ACTIVITIES ARE NOT PREVENTED

## 2025-02-03 ASSESSMENT — PAIN DESCRIPTION - DESCRIPTORS: DESCRIPTORS: ACHING;DISCOMFORT

## 2025-02-03 ASSESSMENT — PAIN DESCRIPTION - LOCATION
LOCATION: ABDOMEN

## 2025-02-03 NOTE — PROGRESS NOTES
ICU PROGRESS NOTE        PATIENT NAME: Christiano Rider  MEDICAL RECORD NO. [id number]  DATE: 2/3/2025    HD: # 5    ACUTE DIAGNOSES/PLAN  Neuro:  GCS 15  Pain: Dilaudid PRN  Awaiting bowel function to resume home meds   CV  HR 80-110s  -160s  HDS no pressors   Pulm  Hx asthma  2 L nasal cannula   Home Advair ordered   Home med: Singulair held while NPO   IS: 1500   GI/Nutrition  Hx GERD, hiatal hernia   GI Bleed 2/2 small bowel mass  POD #4 s/p ex lap with small bowel resection, side to side anastomosis   GI for EGD and Colonoscopy 1/29 hiatal hernia polyps of body and fundus of stomach no bleeding, small internal hemorrhoids, mild diverticulosis, melenic stool within the terminal ileum no bleeding identified  CTA 1/29 with active GI bleeding within loop of SB in right mid abd, small liver lesions suspicious for hemangiomas and hepatic cyst  IR for embolization 1/29 no extrav identified, jejunal uniform-enhancing mass with early venous draining  CTA abdomen 2/2 no active bleeding  CT abdomen w/ PO contrast 2/2 no anastomotic leak or bleed  Protonix 40 BID   NGT to LIWS: 150 cc in 24 hours   Midline abdominal incision with island dressing   Dressing changes per nursing   Strict NPO   Passing flatus   Consider advancement of diet today   Renal/lytes  UOP 0.7 cc/kg/hr  BUN/Cr: 20/0.8  Na/K: 139/4.1  Mag/Phos: 1.9/3.3  iCal: 1.10  LR @ 125cc/hr  Heme  Acute anemia likely 2/2 likely postoperative blood loss   DVT prophylaxis- held   Hgb: 6.6 (7.5, 4.8)  Plt: 200  Received 2 pRBC on 2/2 and 1 OVN  Product: 6 RBC, 1 FFP, 1 Plt  Consider hematologic work up   7.   Endocrine        1. BG 120s  Musculoskeletal  Up as tolerated  Skin  Monitor post op incision  Rash  Received benadryl   Micro  WBC 8.8 (10.6)  Completed Zosyn 1/31  Family/dispo  Remain in ICU  Lines  PIV x3     CHECKLIST    CAM-ICU RASS: n/a  RESTRAINTS: n/a  IVF:   NUTRITION: NPO w/ sips   ANTIBIOTICS: None  GI: protonix BID  DVT:  bladder is filled with contrast and is unremarkable.     Edema in the subcutaneous tissues.  Bilateral hydrocele.  Clips in the  anterior abdominal wall.     IMPRESSION:  1. No evidence for contrast extravasation.  2. Small amount of free fluid in the pelvis.  No evidence for retroperitoneal  hematoma.  3. Bilateral pleural effusions and consolidation in the lung bases without  change.       Rosario Robles MD  2/3/2025, 8:55 AM          Trauma Attending Attestation      I have reviewed the above [facility] note(s). I have seen and examined the pt.  I have discussed the findings, established the care plan and recommendations with Resident.  S/p SBR on 1/30  Some acute blood loss anemia- keep Hb >7.0  Add roxicodone elixir   TEG normal   2LNC   GI bleed - protonix gtt   NGT - clamp for 6 hours  maybe start clears   PT/OT       Cricket Lopes,   2/3/2025  11:54 AM

## 2025-02-03 NOTE — PLAN OF CARE
Problem: Discharge Planning  Goal: Discharge to home or other facility with appropriate resources  2/3/2025 0247 by Nidia Olmos RN  Outcome: Progressing  2/2/2025 1851 by Frida Victor RN  Outcome: Progressing  Flowsheets (Taken 2/2/2025 1400)  Discharge to home or other facility with appropriate resources:   Arrange for needed discharge resources and transportation as appropriate   Identify barriers to discharge with patient and caregiver     Problem: Safety - Adult  Goal: Free from fall injury  2/3/2025 0247 by Nidia Olmos RN  Outcome: Progressing  2/2/2025 1851 by Frida Victor RN  Outcome: Progressing     Problem: Skin/Tissue Integrity  Goal: Skin integrity remains intact  Description: 1.  Monitor for areas of redness and/or skin breakdown  2.  Assess vascular access sites hourly  3.  Every 4-6 hours minimum:  Change oxygen saturation probe site  4.  Every 4-6 hours:  If on nasal continuous positive airway pressure, respiratory therapy assess nares and determine need for appliance change or resting period  2/3/2025 0247 by Nidia Olmos RN  Outcome: Progressing  Flowsheets (Taken 2/2/2025 2000)  Skin Integrity Remains Intact: Monitor for areas of redness and/or skin breakdown  2/2/2025 1851 by Frida Victor RN  Outcome: Progressing  Flowsheets (Taken 2/2/2025 1400)  Skin Integrity Remains Intact:   Monitor for areas of redness and/or skin breakdown   Assess vascular access sites hourly     Problem: Pain  Goal: Verbalizes/displays adequate comfort level or baseline comfort level  2/3/2025 0247 by Nidia Olmos RN  Outcome: Progressing  2/2/2025 1851 by Frida Victor RN  Outcome: Progressing  Flowsheets (Taken 2/2/2025 1600)  Verbalizes/displays adequate comfort level or baseline comfort level:   Encourage patient to monitor pain and request assistance   Assess pain using appropriate pain scale     Problem: ABCDS Injury Assessment  Goal: Absence of physical

## 2025-02-03 NOTE — ANESTHESIA POSTPROCEDURE EVALUATION
Department of Anesthesiology  Postprocedure Note    Patient: Christiano Rider  MRN: [id number]  YOB: 1970  Date of evaluation: 2/3/2025    Procedure Summary       Date: 01/30/25 Room / Location: [address]    Anesthesia Start: 0958 Anesthesia Stop: 1141    Procedure: DIAGNOSTIC LAPAROSCOPY,  EXPLORATORY LAPAROTOMY  WITH SMALL BOWEL RESECTION Diagnosis:       Small bowel mass      (Small bowel mass [K63.89])    Surgeons: Abhilash Barrera MD Responsible Provider: Wilma Paul MD    Anesthesia Type: general ASA Status: 3            Anesthesia Type: No value filed.    Curtis Phase I: Curtis Score: 9    Curtis Phase II:      Anesthesia Post Evaluation    Patient location during evaluation: bedside  Patient participation: complete - patient participated  Level of consciousness: awake and awake and alert  Pain score: 2  Airway patency: patent  Nausea & Vomiting: no nausea and no vomiting  Cardiovascular status: blood pressure returned to baseline and hemodynamically stable  Respiratory status: acceptable  Hydration status: euvolemic  Pain management: adequate        No notable events documented.

## 2025-02-03 NOTE — PROGRESS NOTES
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  Past Surgical History:  has a past surgical history that includes Upper gastrointestinal endoscopy; Colonoscopy; Inguinal hernia repair (Right, 2013); Nose surgery; Ankle fracture surgery; Mandible surgery; Appendectomy; Abdomen surgery; Upper gastrointestinal endoscopy (2016); Elbow surgery; Upper gastrointestinal endoscopy (2019); Upper gastrointestinal endoscopy (2019); Endoscopy, colon, diagnostic; Colonoscopy (N/A, 2021); Upper gastrointestinal endoscopy (N/A, 2021); Upper gastrointestinal endoscopy (2023); Upper gastrointestinal endoscopy (N/A, 2025); Colonoscopy (N/A, 2025); Upper gastrointestinal endoscopy (N/A, 2025); IR EMBOLIZATION HEMORRHAGE (2025); Exploratory laparotomy w/ bowel resection (2025); and laparoscopy (N/A, 2025).    Discharge Recommendations  Discharge Recommendations: Patient would benefit from continued therapy after discharge  OT Equipment Recommendations  Equipment Needed: Yes  Mobility Devices: ADL Assistive Devices  ADL Assistive Devices: Transfer Tub Bench, Reacher  Assessment  Performance deficits / Impairments: Decreased functional mobility ;Decreased ADL status;Decreased endurance;Decreased high-level IADLs;Decreased

## 2025-02-03 NOTE — PLAN OF CARE
Problem: Discharge Planning  Goal: Discharge to home or other facility with appropriate resources  Outcome: Progressing     Problem: Safety - Adult  Goal: Free from fall injury  Outcome: Progressing     Problem: Skin/Tissue Integrity  Goal: Skin integrity remains intact  Description: 1.  Monitor for areas of redness and/or skin breakdown  2.  Assess vascular access sites hourly  3.  Every 4-6 hours minimum:  Change oxygen saturation probe site  4.  Every 4-6 hours:  If on nasal continuous positive airway pressure, respiratory therapy assess nares and determine need for appliance change or resting period  Outcome: Progressing     Problem: Pain  Goal: Verbalizes/displays adequate comfort level or baseline comfort level  Outcome: Progressing     Problem: ABCDS Injury Assessment  Goal: Absence of physical injury  Outcome: Progressing     Problem: Respiratory - Adult  Goal: Achieves optimal ventilation and oxygenation  Outcome: Progressing     Problem: Spiritual Struggle  Goal: Verbalizes spiritual struggle  Outcome: Progressing  Goal: Gains new understanding/perception  Outcome: Progressing  Goal: Growing sense of peace/hope  Outcome: Progressing  Goal: Explore resources for additional follow up, post discharge  Outcome: Progressing     Problem: Emotional Distress  Goal: Verbalization of thoughts and feelings  Outcome: Progressing  Goal: Reduce evidence of anxiety/worry/anger  Outcome: Progressing  Goal: Identifies/restores coping resources/skills  Outcome: Progressing  Goal: Growing sense of peace/hope  Outcome: Progressing  Goal: Explore resources for additional follow up, post discharge  Outcome: Progressing     Problem: Life Adjustment  Goal: Verbalization of feelings regarding change/loss  Outcome: Progressing  Goal: Finding meaning/purpose in the midst of illness/suffering  Outcome: Progressing  Goal: Identifies/restores coping resources/skills  Outcome: Progressing  Goal: Growing sense of peace/hope  Outcome:

## 2025-02-03 NOTE — CARE COORDINATION
Transition Planning    Post Acute Facility/Agency List     Provided patient with the following list, the list includes the overall star ratings obtained from CMS per the Medicare Web site (www.Medicare.gov):     [] Long Term Acute Care Facilities  [] Acute Inpatient Rehabilitation Facilities  [] Skilled Nursing Facilities  [x] Home Care  [] Patient's Insurance specific coverage list for SNF    Provided verbal instructions on how to utilize the QR Code to obtain additional detailed star ratings from www.Medicare.gov

## 2025-02-03 NOTE — PROGRESS NOTES
Physical Therapy  Facility/Department: [facility] CAR 1- SICU  Physical Therapy Initial Assessment    Name: Christiano Rider  : 1970  MRN: [id number]  Date of Service: 2/3/2025    Discharge Recommendations: Further therapy recommended at discharge.  No chief complaint on file.    54M with GI bleed secondary to small bowel mass  : OR for mini laparotomy, small bowel resection and primary anastomosis, 3U PRBC     Patient seen and examined.  Improved exam  Pain: MMT  Antiemetics scheduled and as needed  NGT to LIWS  Await return of bowel function.    Encourage OOB, ambulation, working with PT/OT, ambulate 3 times daily  Dressing changed today.  Wound care order placed      PT Equipment Recommendations  Equipment Needed: Yes  Mobility Devices: Walker  Walker: Rolling      Patient Diagnosis(es): The primary encounter diagnosis was Small intestinal hemorrhage. A diagnosis of Small bowel mass was also pertinent to this visit.  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  Past Surgical History:  has a past surgical history that includes Upper gastrointestinal endoscopy; Colonoscopy; Inguinal hernia repair (Right, 2013); Nose surgery; Ankle fracture surgery; Mandible surgery; Appendectomy; Abdomen surgery; Upper gastrointestinal endoscopy (2016); Elbow surgery; Upper gastrointestinal endoscopy (2019); Upper gastrointestinal endoscopy (2019); Endoscopy, colon, diagnostic; Colonoscopy (N/A, 2021); Upper gastrointestinal endoscopy (N/A, 2021); Upper gastrointestinal endoscopy (2023); Upper gastrointestinal endoscopy (N/A, 2025); Colonoscopy (N/A,  generate solutions;Assistance required to correct errors made;Assistance required to identify errors made  Insights: Decreased awareness of deficits  Initiation: Requires cues for some  Sequencing: Requires cues for some            Gross Assessment  Sensation: Intact (pt denies n/t)       AROM RLE (degrees)  RLE AROM: WFL  AROM LLE (degrees)  LLE AROM : WFL  AROM RUE (degrees)  RUE AROM : WFL  AROM LUE (degrees)  LUE AROM : WFL  Strength RLE  Strength RLE: Exception  R Hip Flexion: 4/5  R Knee Flexion: 4/5  R Knee Extension: 4/5  R Ankle Dorsiflexion: 4/5  R Ankle Plantar flexion: 4/5  Strength LLE  Strength LLE: Exception  L Hip Flexion: 4/5  L Knee Flexion: 4/5  L Knee Extension: 4/5  L Ankle Dorsiflexion: 4/5  L Ankle Plantar Flexion: 4/5  Strength RUE  Strength RUE: WFL  Comment: appears WFL based on functional mobility, see OT note for details  Strength LUE  Strength LUE: WFL  Comment: appears WFL based on functional mobility, see OT note for details           Bed mobility  Supine to Sit: Contact guard assistance  Sit to Supine:  (pt ends in recliner)  Scooting: Contact guard assistance  Bed Mobility Comments: HOB elevated.  Transfers  Sit to Stand: Contact guard assistance  Stand to Sit: Contact guard assistance  Comment: Pt stands impulsively prior to therapist readiness. RW placed in front of pt once pt in standing  Ambulation  Surface: Level tile  Device: Rolling Walker  Assistance: Contact guard assistance  Gait Deviations: Slow Mary;Decreased step length;Decreased step height  Distance: 80 ft  Comments: No LOB noted.  More Ambulation?: No  Stairs/Curb  Stairs?: No     Balance  Posture: Good  Sitting - Static: Good  Sitting - Dynamic: Good;-  Standing - Static: Fair;+  Standing - Dynamic: Fair  Comments: Assessed with RW.                                AM-PAC - Mobility    AM-PAC Basic Mobility - Inpatient   How much help is needed turning from your back to your side while in a flat bed without using

## 2025-02-04 PROBLEM — C49.A3 GIST (GASTROINTESTINAL STROMAL TUMOR) OF SMALL BOWEL, MALIGNANT (HCC): Status: ACTIVE | Noted: 2025-02-04

## 2025-02-04 LAB
ABO/RH: NORMAL
ANION GAP SERPL CALCULATED.3IONS-SCNC: 7 MMOL/L (ref 9–16)
ANTIBODY SCREEN: NEGATIVE
ARM BAND NUMBER: NORMAL
BASOPHILS # BLD: 0.03 K/UL (ref 0–0.2)
BASOPHILS NFR BLD: 0 % (ref 0–2)
BLOOD BANK BLOOD PRODUCT EXPIRATION DATE: NORMAL
BLOOD BANK DISPENSE STATUS: NORMAL
BLOOD BANK ISBT PRODUCT BLOOD TYPE: 5100
BLOOD BANK PRODUCT CODE: NORMAL
BLOOD BANK SAMPLE EXPIRATION: NORMAL
BLOOD BANK UNIT TYPE AND RH: NORMAL
BPU ID: NORMAL
BUN SERPL-MCNC: 17 MG/DL (ref 6–20)
CA-I BLD-SCNC: 1.13 MMOL/L (ref 1.13–1.33)
CALCIUM SERPL-MCNC: 7.8 MG/DL (ref 8.6–10.4)
CHLORIDE SERPL-SCNC: 107 MMOL/L (ref 98–107)
CO2 SERPL-SCNC: 25 MMOL/L (ref 20–31)
COMPONENT: NORMAL
CREAT SERPL-MCNC: 0.8 MG/DL (ref 0.7–1.2)
CROSSMATCH RESULT: NORMAL
EOSINOPHIL # BLD: 0.34 K/UL (ref 0–0.44)
EOSINOPHILS RELATIVE PERCENT: 4 % (ref 1–4)
ERYTHROCYTE [DISTWIDTH] IN BLOOD BY AUTOMATED COUNT: 15.2 % (ref 11.8–14.4)
GFR, ESTIMATED: >90 ML/MIN/1.73M2
GLUCOSE SERPL-MCNC: 111 MG/DL (ref 74–99)
HCT VFR BLD AUTO: 22.4 % (ref 40.7–50.3)
HGB BLD-MCNC: 7.1 G/DL (ref 13–17)
IMM GRANULOCYTES # BLD AUTO: 0.06 K/UL (ref 0–0.3)
IMM GRANULOCYTES NFR BLD: 1 %
LYMPHOCYTES NFR BLD: 1.05 K/UL (ref 1.1–3.7)
LYMPHOCYTES RELATIVE PERCENT: 12 % (ref 24–43)
MAGNESIUM SERPL-MCNC: 1.8 MG/DL (ref 1.6–2.6)
MCH RBC QN AUTO: 28.5 PG (ref 25.2–33.5)
MCHC RBC AUTO-ENTMCNC: 31.7 G/DL (ref 28.4–34.8)
MCV RBC AUTO: 90 FL (ref 82.6–102.9)
MONOCYTES NFR BLD: 0.51 K/UL (ref 0.1–1.2)
MONOCYTES NFR BLD: 6 % (ref 3–12)
NEUTROPHILS NFR BLD: 77 % (ref 36–65)
NEUTS SEG NFR BLD: 6.57 K/UL (ref 1.5–8.1)
NRBC BLD-RTO: 0 PER 100 WBC
PHOSPHATE SERPL-MCNC: 3.6 MG/DL (ref 2.5–4.5)
PLATELET # BLD AUTO: 202 K/UL (ref 138–453)
PMV BLD AUTO: 9.5 FL (ref 8.1–13.5)
POTASSIUM SERPL-SCNC: 3.9 MMOL/L (ref 3.7–5.3)
RBC # BLD AUTO: 2.49 M/UL (ref 4.21–5.77)
RBC # BLD: ABNORMAL 10*6/UL
SODIUM SERPL-SCNC: 139 MMOL/L (ref 136–145)
SURGICAL PATHOLOGY REPORT: NORMAL
TRANSFUSION STATUS: NORMAL
UNIT DIVISION: 0
UNIT ISSUE DATE/TIME: NORMAL
WBC OTHER # BLD: 8.6 K/UL (ref 3.5–11.3)

## 2025-02-04 PROCEDURE — 6370000000 HC RX 637 (ALT 250 FOR IP)

## 2025-02-04 PROCEDURE — 6360000002 HC RX W HCPCS

## 2025-02-04 PROCEDURE — 2500000003 HC RX 250 WO HCPCS: Performed by: STUDENT IN AN ORGANIZED HEALTH CARE EDUCATION/TRAINING PROGRAM

## 2025-02-04 PROCEDURE — 6360000002 HC RX W HCPCS: Performed by: STUDENT IN AN ORGANIZED HEALTH CARE EDUCATION/TRAINING PROGRAM

## 2025-02-04 PROCEDURE — 94640 AIRWAY INHALATION TREATMENT: CPT

## 2025-02-04 PROCEDURE — 84100 ASSAY OF PHOSPHORUS: CPT

## 2025-02-04 PROCEDURE — 36415 COLL VENOUS BLD VENIPUNCTURE: CPT

## 2025-02-04 PROCEDURE — 82330 ASSAY OF CALCIUM: CPT

## 2025-02-04 PROCEDURE — 85025 COMPLETE CBC W/AUTO DIFF WBC: CPT

## 2025-02-04 PROCEDURE — 97110 THERAPEUTIC EXERCISES: CPT

## 2025-02-04 PROCEDURE — 6370000000 HC RX 637 (ALT 250 FOR IP): Performed by: NURSE PRACTITIONER

## 2025-02-04 PROCEDURE — 83735 ASSAY OF MAGNESIUM: CPT

## 2025-02-04 PROCEDURE — 2580000003 HC RX 258: Performed by: STUDENT IN AN ORGANIZED HEALTH CARE EDUCATION/TRAINING PROGRAM

## 2025-02-04 PROCEDURE — 97116 GAIT TRAINING THERAPY: CPT

## 2025-02-04 PROCEDURE — 99223 1ST HOSP IP/OBS HIGH 75: CPT | Performed by: INTERNAL MEDICINE

## 2025-02-04 PROCEDURE — 99232 SBSQ HOSP IP/OBS MODERATE 35: CPT | Performed by: SURGERY

## 2025-02-04 PROCEDURE — 94761 N-INVAS EAR/PLS OXIMETRY MLT: CPT

## 2025-02-04 PROCEDURE — 2060000000 HC ICU INTERMEDIATE R&B

## 2025-02-04 PROCEDURE — 2580000003 HC RX 258

## 2025-02-04 PROCEDURE — 2700000000 HC OXYGEN THERAPY PER DAY

## 2025-02-04 PROCEDURE — 80048 BASIC METABOLIC PNL TOTAL CA: CPT

## 2025-02-04 RX ORDER — MAGNESIUM SULFATE IN WATER 40 MG/ML
2000 INJECTION, SOLUTION INTRAVENOUS ONCE
Status: COMPLETED | OUTPATIENT
Start: 2025-02-04 | End: 2025-02-04

## 2025-02-04 RX ADMIN — PHENOL 1 SPRAY: 1.5 LIQUID ORAL at 04:10

## 2025-02-04 RX ADMIN — METHOCARBAMOL 750 MG: 750 TABLET ORAL at 13:54

## 2025-02-04 RX ADMIN — GABAPENTIN 300 MG: 300 CAPSULE ORAL at 09:10

## 2025-02-04 RX ADMIN — ONDANSETRON 8 MG: 2 INJECTION INTRAMUSCULAR; INTRAVENOUS at 20:27

## 2025-02-04 RX ADMIN — ONDANSETRON 8 MG: 2 INJECTION INTRAMUSCULAR; INTRAVENOUS at 01:13

## 2025-02-04 RX ADMIN — PROCHLORPERAZINE EDISYLATE 10 MG: 5 INJECTION INTRAMUSCULAR; INTRAVENOUS at 04:07

## 2025-02-04 RX ADMIN — ONDANSETRON 8 MG: 2 INJECTION INTRAMUSCULAR; INTRAVENOUS at 09:10

## 2025-02-04 RX ADMIN — ACETAMINOPHEN 1000 MG: 500 TABLET ORAL at 20:26

## 2025-02-04 RX ADMIN — MONTELUKAST 10 MG: 10 TABLET, FILM COATED ORAL at 17:56

## 2025-02-04 RX ADMIN — SODIUM CHLORIDE, PRESERVATIVE FREE 10 ML: 5 INJECTION INTRAVENOUS at 20:26

## 2025-02-04 RX ADMIN — HYDROMORPHONE HYDROCHLORIDE 0.5 MG: 1 INJECTION, SOLUTION INTRAMUSCULAR; INTRAVENOUS; SUBCUTANEOUS at 20:26

## 2025-02-04 RX ADMIN — GABAPENTIN 300 MG: 300 CAPSULE ORAL at 13:54

## 2025-02-04 RX ADMIN — GABAPENTIN 300 MG: 300 CAPSULE ORAL at 20:26

## 2025-02-04 RX ADMIN — SODIUM CHLORIDE: 9 INJECTION, SOLUTION INTRAVENOUS at 09:18

## 2025-02-04 RX ADMIN — ACETAMINOPHEN 1000 MG: 500 TABLET ORAL at 01:13

## 2025-02-04 RX ADMIN — METHOCARBAMOL 750 MG: 750 TABLET ORAL at 20:26

## 2025-02-04 RX ADMIN — MAGNESIUM SULFATE HEPTAHYDRATE 2000 MG: 40 INJECTION, SOLUTION INTRAVENOUS at 09:20

## 2025-02-04 RX ADMIN — METHOCARBAMOL 750 MG: 750 TABLET ORAL at 17:55

## 2025-02-04 RX ADMIN — SODIUM CHLORIDE, PRESERVATIVE FREE 40 MG: 5 INJECTION INTRAVENOUS at 01:13

## 2025-02-04 RX ADMIN — SODIUM CHLORIDE, PRESERVATIVE FREE 40 MG: 5 INJECTION INTRAVENOUS at 13:54

## 2025-02-04 RX ADMIN — POTASSIUM BICARBONATE 20 MEQ: 782 TABLET, EFFERVESCENT ORAL at 09:10

## 2025-02-04 RX ADMIN — ONDANSETRON 8 MG: 2 INJECTION INTRAMUSCULAR; INTRAVENOUS at 13:54

## 2025-02-04 RX ADMIN — BUDESONIDE AND FORMOTEROL FUMARATE DIHYDRATE 2 PUFF: 160; 4.5 AEROSOL RESPIRATORY (INHALATION) at 08:15

## 2025-02-04 RX ADMIN — METHOCARBAMOL 750 MG: 750 TABLET ORAL at 09:11

## 2025-02-04 RX ADMIN — ACETAMINOPHEN 1000 MG: 500 TABLET ORAL at 13:56

## 2025-02-04 RX ADMIN — BUDESONIDE AND FORMOTEROL FUMARATE DIHYDRATE 2 PUFF: 160; 4.5 AEROSOL RESPIRATORY (INHALATION) at 21:42

## 2025-02-04 RX ADMIN — OXYCODONE HYDROCHLORIDE 5 MG: 5 SOLUTION ORAL at 19:03

## 2025-02-04 RX ADMIN — ACETAMINOPHEN 1000 MG: 500 TABLET ORAL at 06:05

## 2025-02-04 ASSESSMENT — PAIN DESCRIPTION - ORIENTATION
ORIENTATION: LOWER
ORIENTATION: LOWER

## 2025-02-04 ASSESSMENT — PAIN SCALES - GENERAL
PAINLEVEL_OUTOF10: 5
PAINLEVEL_OUTOF10: 5
PAINLEVEL_OUTOF10: 7
PAINLEVEL_OUTOF10: 8
PAINLEVEL_OUTOF10: 7

## 2025-02-04 ASSESSMENT — PAIN DESCRIPTION - DESCRIPTORS
DESCRIPTORS: ACHING

## 2025-02-04 ASSESSMENT — PAIN DESCRIPTION - LOCATION
LOCATION: ABDOMEN

## 2025-02-04 NOTE — PLAN OF CARE
POST ICU TRANSFER NOTE    SUBJECTIVE  \"Best I've felt in over a week\" tolerating CLD reporting only mild incisional pain. Had BM this AM with blood. Walking around the unit with walker.     Appears comfortable, visiting with family, remains on room air. Incision approximated with staples with abdominal binder in place    Checklist:  [x]   Oxygen saturation is > 90% on FiO2< 50% (exceptions may be made for patient pathophysiology)    [x]   Vital signs remain at or near baseline without pharmaceutical adjuncts, blood products, or > 2L fluid bolus since transfer   [x]   No suspicion or evidence of a new untreated infection (confusion, cool or cyanotic extremities, poor capillary refill, metabolic acidosis, low urine output)  [x]   Stable GCS, seizures controlled, no invasive neurological monitoring   [x]   No alteration in mental status after transfer from ICU  [x]   No deterioration in renal function since transfer  [x]   Patient care needs do not exceed the capabilities of the unit they were transferred to (suctioning needs, glucose monitoring, neurological monitoring, neurovascular checks, vital sign monitoring, I&O monitoring, drain management        Caitlyn Benz PA-C  Trauma/Surgery Service  2/4/2025 at 5:44 PM

## 2025-02-04 NOTE — PROGRESS NOTES
Report called to 4C RN. All pertinent information given & questions answered. Pt transferred to room 440 via wheelchair. All pt belongings were accounted for & transferred with the pt.   .Electronically signed by Giovani Desai RN on 2/4/2025 at 5:19 PM

## 2025-02-04 NOTE — PLAN OF CARE
Problem: Discharge Planning  Goal: Discharge to home or other facility with appropriate resources  2/4/2025 0413 by Adeola Deutsch RN  Outcome: Progressing  2/3/2025 1828 by Torie Arroyo RN  Outcome: Progressing     Problem: Safety - Adult  Goal: Free from fall injury  2/4/2025 0413 by Adeola Deutsch RN  Outcome: Progressing  2/3/2025 1828 by Torie Arroyo RN  Outcome: Progressing     Problem: Skin/Tissue Integrity  Goal: Skin integrity remains intact  Description: 1.  Monitor for areas of redness and/or skin breakdown  2.  Assess vascular access sites hourly  3.  Every 4-6 hours minimum:  Change oxygen saturation probe site  4.  Every 4-6 hours:  If on nasal continuous positive airway pressure, respiratory therapy assess nares and determine need for appliance change or resting period  2/4/2025 0413 by Adeola Deutsch RN  Outcome: Progressing  2/3/2025 1828 by Torie Arroyo RN  Outcome: Progressing     Problem: Pain  Goal: Verbalizes/displays adequate comfort level or baseline comfort level  2/4/2025 0413 by Adeola Deutsch RN  Outcome: Progressing  2/3/2025 1828 by Torie Arroyo RN  Outcome: Progressing     Problem: ABCDS Injury Assessment  Goal: Absence of physical injury  2/4/2025 0413 by Adeola Deutsch RN  Outcome: Progressing  2/3/2025 1828 by Torie Arroyo RN  Outcome: Progressing     Problem: Respiratory - Adult  Goal: Achieves optimal ventilation and oxygenation  2/4/2025 0413 by Adeola Deutsch RN  Outcome: Progressing  2/3/2025 2120 by Anastacia James RCP  Outcome: Progressing  2/3/2025 1828 by Torie Arroyo RN  Outcome: Progressing     Problem: Spiritual Struggle  Goal: Verbalizes spiritual struggle  2/4/2025 0413 by Adeola Deutsch RN  Outcome: Progressing  2/3/2025 1828 by Torie Arroyo RN  Outcome: Progressing  Goal: Gains new understanding/perception  2/4/2025 0413 by Adeola Deutsch RN  Outcome: Progressing  2/3/2025 1828 by Torie Arroyo RN  Outcome: Progressing  Goal:

## 2025-02-04 NOTE — PLAN OF CARE
Problem: Discharge Planning  Goal: Discharge to home or other facility with appropriate resources  2/4/2025 1141 by Giovani Desai RN  Outcome: Progressing  2/4/2025 0413 by Adeola Deutsch RN  Outcome: Progressing     Problem: Safety - Adult  Goal: Free from fall injury  2/4/2025 1141 by Giovani Desai RN  Outcome: Progressing  2/4/2025 0413 by Adeola Deutsch RN  Outcome: Progressing     Problem: Skin/Tissue Integrity  Goal: Skin integrity remains intact  Description: 1.  Monitor for areas of redness and/or skin breakdown  2.  Assess vascular access sites hourly  3.  Every 4-6 hours minimum:  Change oxygen saturation probe site  4.  Every 4-6 hours:  If on nasal continuous positive airway pressure, respiratory therapy assess nares and determine need for appliance change or resting period  2/4/2025 1141 by Giovani Desai RN  Outcome: Progressing  2/4/2025 0413 by Adeola Deutsch RN  Outcome: Progressing     Problem: Pain  Goal: Verbalizes/displays adequate comfort level or baseline comfort level  2/4/2025 1141 by Giovani Desai RN  Outcome: Progressing  2/4/2025 0413 by Adeola Deutsch RN  Outcome: Progressing     Problem: ABCDS Injury Assessment  Goal: Absence of physical injury  2/4/2025 1141 by Giovani Desai RN  Outcome: Progressing  2/4/2025 0413 by Adeola Deutsch RN  Outcome: Progressing     Problem: Respiratory - Adult  Goal: Achieves optimal ventilation and oxygenation  2/4/2025 1141 by Giovani Desai RN  Outcome: Progressing  2/4/2025 0413 by Adeola Deutsch RN  Outcome: Progressing     Problem: Spiritual Struggle  Goal: Verbalizes spiritual struggle  2/4/2025 1141 by Giovani Desai RN  Outcome: Progressing  2/4/2025 0413 by Adeola Deutsch RN  Outcome: Progressing  Goal: Gains new understanding/perception  2/4/2025 1141 by Giovani Desai RN  Outcome: Progressing  2/4/2025 0413 by Adeola Deutsch RN  Outcome: Progressing  Goal: Growing

## 2025-02-04 NOTE — PROGRESS NOTES
Physical Therapy  Facility/Department: [address] ONC/MED SURG   Physical Therapy Daily Treatment Note    Patient Name: Christiano Rider        MRN: [id number]    : 1970    Date of Service: 2025    No chief complaint on file.    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  Past Surgical History:  has a past surgical history that includes Upper gastrointestinal endoscopy; Colonoscopy; Inguinal hernia repair (Right, 2013); Nose surgery; Ankle fracture surgery; Mandible surgery; Appendectomy; Abdomen surgery; Upper gastrointestinal endoscopy (2016); Elbow surgery; Upper gastrointestinal endoscopy (2019); Upper gastrointestinal endoscopy (2019); Endoscopy, colon, diagnostic; Colonoscopy (N/A, 2021); Upper gastrointestinal endoscopy (N/A, 2021); Upper gastrointestinal endoscopy (2023); Upper gastrointestinal endoscopy (N/A, 2025); Colonoscopy (N/A, 2025); Upper gastrointestinal endoscopy (N/A, 2025); IR EMBOLIZATION HEMORRHAGE (2025); Exploratory laparotomy w/ bowel resection (2025); and laparoscopy (N/A, 2025).    Discharge Recommendations     PT Equipment Recommendations  Equipment Needed: Yes  Mobility Devices: Walker  Walker: Rolling    Assessment  Body Structures, Functions, Activity Limitations Requiring Skilled Therapeutic Intervention: Decreased functional mobility ;Decreased ADL status;Decreased body mechanics;Decreased strength;Decreased high-level IADLs;Decreased balance;Decreased endurance;Decreased safe awareness;Decreased coordination;Increased pain;Decreased posture  Assessment: Pt performs BM

## 2025-02-04 NOTE — PROGRESS NOTES
ICU PROGRESS NOTE    PATIENT NAME: Christiano Rider  MEDICAL RECORD NO. [id number]  DATE: 2/4/2025    HD: # 6    ACUTE DIAGNOSES/PLAN  Neuro:  GCS 15  Pain: Dilaudid PRN, Tylenol BEATRIZ, Sophie prn, gabapentin and robaxin BEATRIZ  Awaiting bowel function to resume home meds  CV  HR 80-110s  -160s  HDS no pressors  Pulm  Hx asthma  RA  Home Advair ordered   Home med: Singulair held while NPO   IS: 1500  GI/Nutrition  Hx GERD, hiatal hernia   GI Bleed 2/2 small bowel mass  POD #4 s/p ex lap with small bowel resection, side to side anastomosis   GI for EGD and Colonoscopy 1/29 hiatal hernia polyps of body and fundus of stomach no bleeding, small internal hemorrhoids, mild diverticulosis, melenic stool within the terminal ileum no bleeding identified  CTA 1/29 with active GI bleeding within loop of SB in right mid abd, small liver lesions suspicious for hemangiomas and hepatic cyst  IR for embolization 1/29 no extrav identified, jejunal uniform-enhancing mass with early venous draining  CTA abdomen 2/2 no active bleeding  CT abdomen w/ PO contrast 2/2 no anastomotic leak or bleed  Protonix 40 BID   NGT clamped: 5 cc in 24 hours  Midline abdominal incision with island dressing   Dressing changes per nursing   CLD 2/3  Passing flatus, one small BM, melena  Consider advancement of diet today   Renal/lytes  UOP 0.7 cc/kg/hr  BUN/Cr: 17/0.8  Na/K: 139/3.9  Mag/Phos: 1.8/3.6  iCal: 1.13  Net +18 L  Heme  Acute anemia likely 2/2 likely postoperative blood loss   DVT prophylaxis- held  Hgb: 7.1 (7.6, 7.8)  Plt: 203  Received 2 pRBC on 2/2 and 1 OVN  + 1u pRBC on 2/3 for Hg 6.6  Product: 6 RBC, 1 FFP, 1 Plt  Consider hematologic work up  7.   Endocrine        1. -120s  Musculoskeletal  Up as tolerated  Skin  Monitor post op incision  Rash  Received benadryl   Micro  WBC 8.6 (8.8)  Completed Zosyn 1/31  Family/dispo  Remain in ICU  Lines  PIV x3  NGT    CHECKLIST    CAM-ICU RASS: 0  RESTRAINTS: none  IVF: not

## 2025-02-04 NOTE — PLAN OF CARE
Problem: Respiratory - Adult  Goal: Achieves optimal ventilation and oxygenation  2/3/2025 2120 by Anastacia James, RASHIDA  Outcome: Progressing   BRONCHOSPASM/BRONCHOCONSTRICTION     [x]         IMPROVE AERATION/BREATH SOUNDS  [x]   ADMINISTER BRONCHODILATOR THERAPY AS APPROPRIATE  [x]   ASSESS BREATH SOUNDS  []   IMPLEMENT AEROSOL/MDI PROTOCOL  [x]   PATIENT EDUCATION AS NEEDED

## 2025-02-04 NOTE — PROGRESS NOTES
Trauma/Surgical Critical Care Sign Out Note:       Code Status: Full Code    Mode of provider to provider communication:        [x] Via telephone   [] In person     Date and time of sign-out: 2/4/2025 12:48 PM     Criteria Met for Transfer:  [x]   Oxygen saturation is > 90% on FiO2< 50% (exceptions may be made for patient pathophysiology)    [x]   Vital signs remain at or near baseline without pharmaceutical adjuncts, blood products, or > 2L fluid bolus in the last 24 hours  [x]   No suspicion or evidence of a new untreated infection (confusion, cool or cyanotic extremities, poor capillary refill, metabolic acidosis, low urine output)  [x]   Stable GCS, seizures controlled, no invasive neurological monitoring   [x]   Altered mental status is stable and able to be safely managed outside the ICU  [x]   No deterioration in renal function in the last 24 hours (creatinine > 50% increase, new onset oliguria)  [x]   Patient care needs do not exceed the capabilities of the unit they are being transferred to (suctioning needs, glucose monitoring, neurological monitoring, neurovascular checks, vital sign monitoring, I&O monitoring, drain management  [x]   No longer requiring mechanical ventilation via endotracheal intubation and/or decreasing O2/CPAP requirements  [x]   No need for medications that cannot be administered outside the ICU  [x]   PIC score >6      Reason for ICU admission:  Low hemoglobin    Injuries:  None    ICU course summary:  1/29/2025: +3u pRBC at [place], EGD x2 with no sign of active bleed, colonoscopy with poor prep and blood, but no signs of active bleed. IR attempted to embolize, but found no active extravasation  1/30: OR for mini-laparotomy, small bowel mass resection and primary anastomosis, 3U PRBC 1 FFP, 1plt,  Protonix IV->BD PO, rash itchy, 25 benadryl, fent PCA  1/31: island dressing saturated, changed ovn, NGT, Zosyn complete, transfer to step-down, 250cc per NGT during day  2/1: DC PCA

## 2025-02-04 NOTE — CONSULTS
Today's Date: 2/4/2025  Patient Name: Christiano Rider  Date of admission: 1/29/2025  6:43 PM  Patient's age: 54 y.o., 1970  Admission Dx: GI bleed [K92.2]    Reason for Consult: GIST  Requesting Physician: Abhilash Brarera MD    Chief Complaint:  abdominal pain    History Obtained From:  patient, electronic medical record    History of Present Illness:      The patient is a 54 y.o. male with medical history significant for GERD/eosinophilic esophagitis, asthma who is initially admitted to the [facility] for abdominal pain and blood in stool.  He was found to be anemic requiring blood transfusion.  GI was consulted and patient on the EGD and colonoscopy.  EGD showed 2 cm hiatal hernia with no active bleeding.  Colonoscopy did show melanotic stools.  So patient underwent repeat EGD which showed blood in the terminal part.  CTA was obtained which was concerning for small bowel hemorrhage and he was transferred to [facility] for IR guided embolization.  He received multiple transfusions in between due to acute blood loss anemia.  He did undergo IR guided visualization which showed a small bowel mass with possible AV shunting.  General surgery was consulted and patient underwent diagnostic exploratory laparotomy with resection of a 3x3 cm polypoidal extraluminal mass.  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  Mitotic rate 1 per 5 mm².  CD1 1 7 positive DOG1 positive    Past Medical History:   has a past medical history of Asthma, Discogenic syndrome, lumbar, EE (eosinophilic esophagitis), Gastritis, GERD (gastroesophageal reflux disease), Hematuria, Hiatal hernia, Male pattern baldness, Mandibular retrognathism, Maxillary hyperplasia, Penile lesion, Prepatellar bursitis, Prostatic congestion, Reactive airway disease, and Tension headache.    Past Surgical History:   has a past surgical  bowel along the upper abdomen.     Status post nasogastric tube placement which is looped along the fundus of the stomach and appears in good position with mildly dilated loops of bowel throughout the upper abdomen. Mild cardiomegaly and mild central pulmonary congestion which is more prominent. Hazy bibasilar opacities with small bibasilar pleural effusions which is more prominent     XR ABDOMEN FOR NG/OG/NE TUBE PLACEMENT    Result Date: 1/31/2025  EXAMINATION: ONE SUPINE XRAY VIEW(S) OF THE ABDOMEN 1/31/2025 3:39 pm COMPARISON: None. HISTORY: ORDERING SYSTEM PROVIDED HISTORY: Confirmation of course of NG/OG/NE tube and location of tip of tube TECHNOLOGIST PROVIDED HISTORY: Confirmation of course of NG/OG/NE tube and location of tip of tube Portable?->Yes FINDINGS: Enteric tube tip and side hole project at the gastric body.  Gaseous distention of the visualized bowel.  No free air.     Enteric tube tip and side hole project at the gastric body.     IR EMBOLIZATION HEMORRHAGE    Result Date: 1/30/2025  PROCEDURE: IR MESENTERIC ANGIOGRAPHY MODERATE CONSCIOUS SEDATION HISTORY: ORDERING SYSTEM PROVIDED HISTORY: Small intestinal hemorrhage TECHNOLOGIST PROVIDED HISTORY: GI Bleed CONTRAST: 210 mL Isovue 370 SEDATION: Moderate conscious sedation including dedicated interventional radiology nurse and vital sign monitoring.  Sedation start time: 7:21 p.m., sedation end time: 8:55 p.m..  3 mg Versed, 150 mcg fentanyl. FLUOROSCOPY DOSE AND TYPE OR TIME AND EXPOSURES: Fluoro time: 16.0 minutes; DAP: 19,713 cGy per cm2 DESCRIPTION OF PROCEDURE: Informed consent was obtained following a detailed explanation of the procedure including but not limited to risks of infection, injury to the vessel wall or nearby structures, nontarget embolization, and need for additional procedures such as surgery.  [place] protocol was observed. Sterile gowns, masks, hats and gloves utilized for maximal sterile barrier. After anesthetization of

## 2025-02-05 LAB
ANION GAP SERPL CALCULATED.3IONS-SCNC: 6 MMOL/L (ref 9–16)
BASOPHILS # BLD: 0.03 K/UL (ref 0–0.2)
BASOPHILS NFR BLD: 0 % (ref 0–2)
BUN SERPL-MCNC: 12 MG/DL (ref 6–20)
CA-I BLD-SCNC: 1.12 MMOL/L (ref 1.13–1.33)
CALCIUM SERPL-MCNC: 7.9 MG/DL (ref 8.6–10.4)
CHLORIDE SERPL-SCNC: 106 MMOL/L (ref 98–107)
CO2 SERPL-SCNC: 26 MMOL/L (ref 20–31)
CREAT SERPL-MCNC: 0.8 MG/DL (ref 0.7–1.2)
EOSINOPHIL # BLD: 0.36 K/UL (ref 0–0.44)
EOSINOPHILS RELATIVE PERCENT: 5 % (ref 1–4)
ERYTHROCYTE [DISTWIDTH] IN BLOOD BY AUTOMATED COUNT: 15.1 % (ref 11.8–14.4)
GFR, ESTIMATED: >90 ML/MIN/1.73M2
GLUCOSE SERPL-MCNC: 117 MG/DL (ref 74–99)
HCT VFR BLD AUTO: 24.1 % (ref 40.7–50.3)
HGB BLD-MCNC: 7.7 G/DL (ref 13–17)
IMM GRANULOCYTES # BLD AUTO: 0.04 K/UL (ref 0–0.3)
IMM GRANULOCYTES NFR BLD: 1 %
LYMPHOCYTES NFR BLD: 1.2 K/UL (ref 1.1–3.7)
LYMPHOCYTES RELATIVE PERCENT: 15 % (ref 24–43)
MAGNESIUM SERPL-MCNC: 2.1 MG/DL (ref 1.6–2.6)
MCH RBC QN AUTO: 29.1 PG (ref 25.2–33.5)
MCHC RBC AUTO-ENTMCNC: 32 G/DL (ref 28.4–34.8)
MCV RBC AUTO: 90.9 FL (ref 82.6–102.9)
MONOCYTES NFR BLD: 0.47 K/UL (ref 0.1–1.2)
MONOCYTES NFR BLD: 6 % (ref 3–12)
NEUTROPHILS NFR BLD: 73 % (ref 36–65)
NEUTS SEG NFR BLD: 5.7 K/UL (ref 1.5–8.1)
NRBC BLD-RTO: 0 PER 100 WBC
PHOSPHATE SERPL-MCNC: 3.2 MG/DL (ref 2.5–4.5)
PLATELET # BLD AUTO: 222 K/UL (ref 138–453)
PMV BLD AUTO: 9.5 FL (ref 8.1–13.5)
POTASSIUM SERPL-SCNC: 3.8 MMOL/L (ref 3.7–5.3)
RBC # BLD AUTO: 2.65 M/UL (ref 4.21–5.77)
RBC # BLD: ABNORMAL 10*6/UL
SODIUM SERPL-SCNC: 138 MMOL/L (ref 136–145)
WBC OTHER # BLD: 7.8 K/UL (ref 3.5–11.3)

## 2025-02-05 PROCEDURE — 6370000000 HC RX 637 (ALT 250 FOR IP)

## 2025-02-05 PROCEDURE — 2700000000 HC OXYGEN THERAPY PER DAY

## 2025-02-05 PROCEDURE — 80048 BASIC METABOLIC PNL TOTAL CA: CPT

## 2025-02-05 PROCEDURE — 99232 SBSQ HOSP IP/OBS MODERATE 35: CPT | Performed by: INTERNAL MEDICINE

## 2025-02-05 PROCEDURE — 99232 SBSQ HOSP IP/OBS MODERATE 35: CPT

## 2025-02-05 PROCEDURE — 84100 ASSAY OF PHOSPHORUS: CPT

## 2025-02-05 PROCEDURE — 6360000002 HC RX W HCPCS

## 2025-02-05 PROCEDURE — 94640 AIRWAY INHALATION TREATMENT: CPT

## 2025-02-05 PROCEDURE — 82330 ASSAY OF CALCIUM: CPT

## 2025-02-05 PROCEDURE — 2060000000 HC ICU INTERMEDIATE R&B

## 2025-02-05 PROCEDURE — 6360000002 HC RX W HCPCS: Performed by: STUDENT IN AN ORGANIZED HEALTH CARE EDUCATION/TRAINING PROGRAM

## 2025-02-05 PROCEDURE — 6370000000 HC RX 637 (ALT 250 FOR IP): Performed by: NURSE PRACTITIONER

## 2025-02-05 PROCEDURE — 2500000003 HC RX 250 WO HCPCS: Performed by: STUDENT IN AN ORGANIZED HEALTH CARE EDUCATION/TRAINING PROGRAM

## 2025-02-05 PROCEDURE — 83735 ASSAY OF MAGNESIUM: CPT

## 2025-02-05 PROCEDURE — 99024 POSTOP FOLLOW-UP VISIT: CPT | Performed by: SURGERY

## 2025-02-05 PROCEDURE — 85025 COMPLETE CBC W/AUTO DIFF WBC: CPT

## 2025-02-05 PROCEDURE — 2580000003 HC RX 258

## 2025-02-05 PROCEDURE — 36415 COLL VENOUS BLD VENIPUNCTURE: CPT

## 2025-02-05 PROCEDURE — 94761 N-INVAS EAR/PLS OXIMETRY MLT: CPT

## 2025-02-05 RX ORDER — FUROSEMIDE 10 MG/ML
20 INJECTION INTRAMUSCULAR; INTRAVENOUS EVERY 6 HOURS SCHEDULED
Status: COMPLETED | OUTPATIENT
Start: 2025-02-05 | End: 2025-02-05

## 2025-02-05 RX ADMIN — ACETAMINOPHEN 1000 MG: 500 TABLET ORAL at 20:11

## 2025-02-05 RX ADMIN — OXYCODONE HYDROCHLORIDE 5 MG: 5 SOLUTION ORAL at 20:11

## 2025-02-05 RX ADMIN — FUROSEMIDE 20 MG: 10 INJECTION, SOLUTION INTRAMUSCULAR; INTRAVENOUS at 17:51

## 2025-02-05 RX ADMIN — BUDESONIDE AND FORMOTEROL FUMARATE DIHYDRATE 2 PUFF: 160; 4.5 AEROSOL RESPIRATORY (INHALATION) at 20:55

## 2025-02-05 RX ADMIN — METHOCARBAMOL 750 MG: 750 TABLET ORAL at 09:19

## 2025-02-05 RX ADMIN — SODIUM CHLORIDE, PRESERVATIVE FREE 40 MG: 5 INJECTION INTRAVENOUS at 00:55

## 2025-02-05 RX ADMIN — ONDANSETRON 8 MG: 2 INJECTION INTRAMUSCULAR; INTRAVENOUS at 13:03

## 2025-02-05 RX ADMIN — FUROSEMIDE 20 MG: 10 INJECTION, SOLUTION INTRAMUSCULAR; INTRAVENOUS at 09:55

## 2025-02-05 RX ADMIN — SODIUM CHLORIDE, PRESERVATIVE FREE 5 ML: 5 INJECTION INTRAVENOUS at 09:20

## 2025-02-05 RX ADMIN — SODIUM CHLORIDE, PRESERVATIVE FREE 10 ML: 5 INJECTION INTRAVENOUS at 20:13

## 2025-02-05 RX ADMIN — SODIUM CHLORIDE, PRESERVATIVE FREE 40 MG: 5 INJECTION INTRAVENOUS at 13:03

## 2025-02-05 RX ADMIN — MONTELUKAST 10 MG: 10 TABLET, FILM COATED ORAL at 17:51

## 2025-02-05 RX ADMIN — GABAPENTIN 300 MG: 300 CAPSULE ORAL at 09:20

## 2025-02-05 RX ADMIN — METHOCARBAMOL 750 MG: 750 TABLET ORAL at 20:11

## 2025-02-05 RX ADMIN — METHOCARBAMOL 750 MG: 750 TABLET ORAL at 17:51

## 2025-02-05 RX ADMIN — BUDESONIDE AND FORMOTEROL FUMARATE DIHYDRATE 2 PUFF: 160; 4.5 AEROSOL RESPIRATORY (INHALATION) at 08:54

## 2025-02-05 RX ADMIN — ONDANSETRON 8 MG: 2 INJECTION INTRAMUSCULAR; INTRAVENOUS at 00:55

## 2025-02-05 RX ADMIN — ACETAMINOPHEN 1000 MG: 500 TABLET ORAL at 13:03

## 2025-02-05 RX ADMIN — METHOCARBAMOL 750 MG: 750 TABLET ORAL at 13:04

## 2025-02-05 RX ADMIN — OXYCODONE HYDROCHLORIDE 5 MG: 5 SOLUTION ORAL at 01:00

## 2025-02-05 RX ADMIN — GABAPENTIN 300 MG: 300 CAPSULE ORAL at 20:11

## 2025-02-05 RX ADMIN — ENOXAPARIN SODIUM 40 MG: 100 INJECTION SUBCUTANEOUS at 09:20

## 2025-02-05 RX ADMIN — ONDANSETRON 8 MG: 2 INJECTION INTRAMUSCULAR; INTRAVENOUS at 20:11

## 2025-02-05 RX ADMIN — GABAPENTIN 300 MG: 300 CAPSULE ORAL at 13:03

## 2025-02-05 RX ADMIN — ONDANSETRON 8 MG: 2 INJECTION INTRAMUSCULAR; INTRAVENOUS at 06:07

## 2025-02-05 ASSESSMENT — PAIN SCALES - GENERAL
PAINLEVEL_OUTOF10: 7
PAINLEVEL_OUTOF10: 5
PAINLEVEL_OUTOF10: 5
PAINLEVEL_OUTOF10: 7

## 2025-02-05 NOTE — PLAN OF CARE
Progressing  Goal: Growing sense of peace/hope  Outcome: Progressing  Goal: Explore resources for additional follow up, post discharge  Outcome: Progressing     Problem: Life Adjustment  Goal: Verbalization of feelings regarding change/loss  Outcome: Progressing  Goal: Finding meaning/purpose in the midst of illness/suffering  Outcome: Progressing  Goal: Identifies/restores coping resources/skills  Outcome: Progressing  Goal: Growing sense of peace/hope  Outcome: Progressing  Goal: Explore resources for additional follow up, post discharge  Outcome: Progressing     Problem: Support with Decision-Making  Goal: Verbalization of thoughts/feelings regarding decision  Outcome: Progressing  Goal: Movement towards resolution of decision  Outcome: Progressing  Goal: Identifies/restores coping resources/skills  Outcome: Progressing  Goal: Explore resources for additional follow up, post discharge  Outcome: Progressing     Problem: Unresolved Conflict/Relationships  Goal: Verbalization of thoughts and feelings  Outcome: Progressing  Goal: Movement towards resolution of conflict/strained relationship  Outcome: Progressing  Goal: Explore resources for additional follow up, post discharge  Outcome: Progressing     Problem: Sacramental/Gnosticism Needs  Goal: Provide for sacramental/Adventism needs as appropriate,per patient/family request  Outcome: Progressing     Problem: Grief  Goal: Verbalizaton of thoughts and feelings regarding loss  Outcome: Progressing  Goal: Establishing/maintaining support systems  Outcome: Progressing  Goal: Growing sense of grief as a process and identify ways of coping  Outcome: Progressing  Goal: Explore resources for additional follow up, post discharge  Outcome: Progressing     Problem: End of Life  Goal: Verbalization of thoughts and feelings regarding death/dying  Outcome: Progressing  Goal: Address spiritual needs  Outcome: Progressing  Goal: Acknowledge a sense of peace  Outcome:  Progressing  Goal: Identifies/restores coping resources/skills  Outcome: Progressing  Goal: Death with dignity  Outcome: Progressing  Goal: Explore resources for additional follow up, post discharge  Outcome: Progressing

## 2025-02-05 NOTE — PROGRESS NOTES
Physical Therapy        Physical Therapy Cancel Note      DATE: 2025    NAME: Christiano Rider  MRN: [id number]   : 1970      Patient not seen this date for Physical Therapy due to:    Patient Declined: pt states he is fatigued at this time and wanting to save energy to shower. Ck       Electronically signed by Jadyn Jacques PT on 2025 at 3:03 PM

## 2025-02-05 NOTE — PROGRESS NOTES
Comprehensive Nutrition Assessment    Type and Reason for Visit:  ALEJANDRO    Nutrition Recommendations/Plan:   Continue current diet order.   High calorie / high protein ONS TID.   Monitor po intake, weight, labs.     Malnutrition Assessment:  Malnutrition Status:  At risk for malnutrition (02/05/25 8328)    Context:  Acute Illness     Findings of the 6 clinical characteristics of malnutrition:  Energy Intake:  75% or less of estimated energy requirements for 7 or more days  Weight Loss:  No weight loss     Body Fat Loss:  Mild body fat loss Orbital, Buccal region   Muscle Mass Loss:  No muscle mass loss    Fluid Accumulation:  Mild Extremities   Strength:       Nutrition Assessment:    Patient seen today for length of stay assessment. Patient admitted for GI bleed. PMH: GERD. Weight trend reviewed, weight appears to be trending upward likely r/t accuracy and/or fluid. Patient advanced to regular diet on 2/4 - tolerating well. During visit patient stated he ate ~50% of his breakfast tray. RD discussed adding ONS TID for additional calories/protein - patient agreeable.    Nutrition Related Findings:    Last BM 2/4. +2 BLE, +3 LUE, +1 RUE edmea. Medications/Labs reviewed. Wound Type: None       Current Nutrition Intake & Therapies:    Average Meal Intake: 1-25%  Average Supplements Intake: None Ordered  ADULT DIET; Regular    Anthropometric Measures:  Height: 188 cm (6' 2\")  Ideal Body Weight (IBW): 190 lbs (86 kg)    Current Body Weight: 92.1 kg (203 lb 0.7 oz),   IBW. Weight Source: Standing scale  Current BMI (kg/m2): 26.1   Weight Adjustment For: No Adjustment   BMI Categories: Overweight (BMI 25.0-29.9)    Estimated Daily Nutrient Needs:  Energy Requirements Based On: Kcal/kg  Weight Used for Energy Requirements: Current  Energy (kcal/day): 2300-2500kcals/day  Weight Used for Protein Requirements: Current  Protein (g/day): 110-150g protein/day  Method Used for Fluid Requirements: 1 ml/kcal  Fluid (ml/day): or per

## 2025-02-05 NOTE — PROGRESS NOTES
ICU PROGRESS NOTE    PATIENT NAME: Christiano Rider  MEDICAL RECORD NO. [id number]  DATE: 2/5/2025    HD: # 7    ACUTE DIAGNOSES/PLAN  Pain: Dilaudid PRN, Tylenol BEATRIZ, Sophie prn, gabapentin and robaxin BEATRIZ  Hx asthma: wean off 1L NC Home Advair ordered   GI Bleed 2/2 small bowel mass  POD #6 s/p ex lap with small bowel resection, side to side anastomosis   GI for EGD and Colonoscopy 1/29 hiatal hernia polyps of body and fundus of stomach no bleeding, small internal hemorrhoids, mild diverticulosis, melenic stool within the terminal ileum no bleeding identified  CTA 1/29 with active GI bleeding within loop of SB in right mid abd, small liver lesions suspicious for hemangiomas and hepatic cyst  IR for embolization 1/29 no extrav identified, jejunal uniform-enhancing mass with early venous draining  Protonix 40 BID   Tolerating regular diet   Acute anemia likely 2/2 likely postoperative blood loss   DVT prophylaxis- resumed  Hgb: 7.7 (7.1, 7.6, 7.8)  Up as tolerated  Per heme/onc recs low risk GIST, needs to complete work up but likely will not need adjuvant therapy      Chief Complaint: \"I want to take a shower today\"    SUBJECTIVE    Christiano is doing well this AM, he is concerned about the amount of swelling, he feels his hgb is low from being over resuscitated. He report wanting to take a shower, he is requesting to wear his dressing in the shower. He states he does not need oxygen but would prefer it to be on one more night, he is not using it during the day. He had 1 black tarry stool yesterday, he feels the best he has felt after 8 days in the hospital. He is tolerating his regular diet       OBJECTIVE  VITALS:   Vitals:    02/05/25 0321   BP: 135/87   Pulse: 76   Resp: 10   Temp: 98.3 °F (36.8 °C)   SpO2: 100%       Physical Exam  Constitutional:       General: He is not in acute distress.     Appearance: He is not ill-appearing or toxic-appearing.   HENT:      Head: Normocephalic and atraumatic.      Nose:

## 2025-02-05 NOTE — PROGRESS NOTES
bursitis, Prostatic congestion, Reactive airway disease, and Tension headache.    Past Surgical History:   has a past surgical history that includes Upper gastrointestinal endoscopy; Colonoscopy; Inguinal hernia repair (Right, 12/19/2013); Nose surgery; Ankle fracture surgery; Mandible surgery; Appendectomy; Abdomen surgery; Upper gastrointestinal endoscopy (06/08/2016); Elbow surgery; Upper gastrointestinal endoscopy (02/13/2019); Upper gastrointestinal endoscopy (02/13/2019); Endoscopy, colon, diagnostic; Colonoscopy (N/A, 03/22/2021); Upper gastrointestinal endoscopy (N/A, 03/22/2021); Upper gastrointestinal endoscopy (04/04/2023); Upper gastrointestinal endoscopy (N/A, 01/28/2025); Colonoscopy (N/A, 01/29/2025); Upper gastrointestinal endoscopy (N/A, 01/29/2025); IR EMBOLIZATION HEMORRHAGE (01/29/2025); Exploratory laparotomy w/ bowel resection (01/30/2025); and laparoscopy (N/A, 1/30/2025).     Medications:    Prior to Admission medications    Medication Sig Start Date End Date Taking? Authorizing Provider   albuterol sulfate (PROAIR RESPICLICK) 108 (90 Base) MCG/ACT aerosol powder inhalation Inhale 2 puffs into the lungs every 6 hours as needed for Wheezing or Shortness of Breath 1/23/25   Elan Shields MD   methylPREDNISolone (MEDROL DOSEPACK) 4 MG tablet Take by mouth as directed. 1/23/25   Elan Shields MD   fluticasone-salmeterol (ADVAIR DISKUS) 100-50 MCG/ACT AEPB diskus inhaler Inhale 1 puff into the lungs in the morning and 1 puff in the evening. 9/20/24   Elan Shields MD   montelukast (SINGULAIR) 10 MG tablet Take 1 tablet by mouth every evening 9/20/24   Elan Shields MD   vitamin D (ERGOCALCIFEROL) 1.25 MG (32373 UT) CAPS capsule Take 1 capsule by mouth once a week 9/20/24   Elan Shields MD   esomeprazole (NEXIUM) 20 MG delayed release capsule TAKE 1 CAPSULE BY MOUTH TWICE A DAY 4/13/22   Roxanne Ware MD   albuterol sulfate  (90 Base) MCG/ACT inhaler  to account for patient's symptoms. 4. Punctate right and 2 mm left nonobstructing nephrolithiasis.        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  CD1 1 7 positive  Status post diagnostic exploratory laparotomy  Acute anemia secondary to blood loss from GI bleed  Mild persistent asthma  GERD    Plan:  I reviewed the labs/imaging available to me, outside records and discussed with the patient. I explained the significance of these abnormalities and possible etiology and management options.  54-year-old male presented with complaints of abdominal pain and blood in stool. He was found to have small bowel mass and underwent diagnostic exploratory laparotomy with resection of the mass.  Pathology of the mass came back positive for gastrointestinal stromal tumor with negative margins, low-grade with mitotic rate 1/5 mm²  Overall patient's picture compatible with throat is congested and no indication for adjuvant imatinib  He will need full imaging diagnostic workup with PET/CT and surveillance outpatient  Outpatient follow-up with oncology in 6 weeks  Final recommendations to follow after discussing with attending physician, Dr. Norma Grossman MD  Internal Medicine Resident, PGY-2  [place], [place]  2/5/2025,11:21 AM      I have seen and examined the patient independently.  I reviewed all laboratory and imaging studies that are relevant.  I agree with the resident note with the addendum.    Electronically signed by Matt Green MD on 2/5/2025 at 9:35 PM

## 2025-02-06 LAB
ANION GAP SERPL CALCULATED.3IONS-SCNC: 8 MMOL/L (ref 9–16)
BASOPHILS # BLD: 0.05 K/UL (ref 0–0.2)
BASOPHILS NFR BLD: 1 % (ref 0–2)
BUN SERPL-MCNC: 11 MG/DL (ref 6–20)
CA-I BLD-SCNC: 1.07 MMOL/L (ref 1.13–1.33)
CALCIUM SERPL-MCNC: 7.9 MG/DL (ref 8.6–10.4)
CHLORIDE SERPL-SCNC: 104 MMOL/L (ref 98–107)
CO2 SERPL-SCNC: 26 MMOL/L (ref 20–31)
CREAT SERPL-MCNC: 0.8 MG/DL (ref 0.7–1.2)
EOSINOPHIL # BLD: 0.34 K/UL (ref 0–0.44)
EOSINOPHILS RELATIVE PERCENT: 4 % (ref 1–4)
ERYTHROCYTE [DISTWIDTH] IN BLOOD BY AUTOMATED COUNT: 14.9 % (ref 11.8–14.4)
GFR, ESTIMATED: >90 ML/MIN/1.73M2
GLUCOSE SERPL-MCNC: 111 MG/DL (ref 74–99)
HCT VFR BLD AUTO: 23.5 % (ref 40.7–50.3)
HGB BLD-MCNC: 7.7 G/DL (ref 13–17)
IMM GRANULOCYTES # BLD AUTO: 0.06 K/UL (ref 0–0.3)
IMM GRANULOCYTES NFR BLD: 1 %
LYMPHOCYTES NFR BLD: 1.36 K/UL (ref 1.1–3.7)
LYMPHOCYTES RELATIVE PERCENT: 18 % (ref 24–43)
MAGNESIUM SERPL-MCNC: 2.2 MG/DL (ref 1.6–2.6)
MCH RBC QN AUTO: 29.2 PG (ref 25.2–33.5)
MCHC RBC AUTO-ENTMCNC: 32.8 G/DL (ref 28.4–34.8)
MCV RBC AUTO: 89 FL (ref 82.6–102.9)
MONOCYTES NFR BLD: 0.47 K/UL (ref 0.1–1.2)
MONOCYTES NFR BLD: 6 % (ref 3–12)
NEUTROPHILS NFR BLD: 71 % (ref 36–65)
NEUTS SEG NFR BLD: 5.48 K/UL (ref 1.5–8.1)
NRBC BLD-RTO: 0 PER 100 WBC
PHOSPHATE SERPL-MCNC: 4.1 MG/DL (ref 2.5–4.5)
PLATELET # BLD AUTO: 239 K/UL (ref 138–453)
PMV BLD AUTO: 9.7 FL (ref 8.1–13.5)
POTASSIUM SERPL-SCNC: 4 MMOL/L (ref 3.7–5.3)
RBC # BLD AUTO: 2.64 M/UL (ref 4.21–5.77)
RBC # BLD: ABNORMAL 10*6/UL
SODIUM SERPL-SCNC: 138 MMOL/L (ref 136–145)
WBC OTHER # BLD: 7.8 K/UL (ref 3.5–11.3)

## 2025-02-06 PROCEDURE — 82330 ASSAY OF CALCIUM: CPT

## 2025-02-06 PROCEDURE — 6360000002 HC RX W HCPCS

## 2025-02-06 PROCEDURE — 2700000000 HC OXYGEN THERAPY PER DAY

## 2025-02-06 PROCEDURE — 2500000003 HC RX 250 WO HCPCS: Performed by: STUDENT IN AN ORGANIZED HEALTH CARE EDUCATION/TRAINING PROGRAM

## 2025-02-06 PROCEDURE — 6370000000 HC RX 637 (ALT 250 FOR IP): Performed by: NURSE PRACTITIONER

## 2025-02-06 PROCEDURE — 97530 THERAPEUTIC ACTIVITIES: CPT

## 2025-02-06 PROCEDURE — 99232 SBSQ HOSP IP/OBS MODERATE 35: CPT | Performed by: INTERNAL MEDICINE

## 2025-02-06 PROCEDURE — 99024 POSTOP FOLLOW-UP VISIT: CPT | Performed by: SURGERY

## 2025-02-06 PROCEDURE — 84100 ASSAY OF PHOSPHORUS: CPT

## 2025-02-06 PROCEDURE — 6370000000 HC RX 637 (ALT 250 FOR IP)

## 2025-02-06 PROCEDURE — 36415 COLL VENOUS BLD VENIPUNCTURE: CPT

## 2025-02-06 PROCEDURE — 2060000000 HC ICU INTERMEDIATE R&B

## 2025-02-06 PROCEDURE — 80048 BASIC METABOLIC PNL TOTAL CA: CPT

## 2025-02-06 PROCEDURE — 2580000003 HC RX 258

## 2025-02-06 PROCEDURE — 97535 SELF CARE MNGMENT TRAINING: CPT

## 2025-02-06 PROCEDURE — 85025 COMPLETE CBC W/AUTO DIFF WBC: CPT

## 2025-02-06 PROCEDURE — 94761 N-INVAS EAR/PLS OXIMETRY MLT: CPT

## 2025-02-06 PROCEDURE — 83735 ASSAY OF MAGNESIUM: CPT

## 2025-02-06 PROCEDURE — 94640 AIRWAY INHALATION TREATMENT: CPT

## 2025-02-06 RX ORDER — CALCIUM CARBONATE 500 MG/1
500 TABLET, CHEWABLE ORAL 3 TIMES DAILY PRN
Status: DISCONTINUED | OUTPATIENT
Start: 2025-02-06 | End: 2025-02-07 | Stop reason: HOSPADM

## 2025-02-06 RX ORDER — FUROSEMIDE 10 MG/ML
20 INJECTION INTRAMUSCULAR; INTRAVENOUS 2 TIMES DAILY
Status: COMPLETED | OUTPATIENT
Start: 2025-02-06 | End: 2025-02-06

## 2025-02-06 RX ADMIN — ONDANSETRON 8 MG: 2 INJECTION INTRAMUSCULAR; INTRAVENOUS at 14:25

## 2025-02-06 RX ADMIN — METHOCARBAMOL 750 MG: 750 TABLET ORAL at 14:26

## 2025-02-06 RX ADMIN — MONTELUKAST 10 MG: 10 TABLET, FILM COATED ORAL at 18:33

## 2025-02-06 RX ADMIN — OXYCODONE HYDROCHLORIDE 5 MG: 5 SOLUTION ORAL at 06:55

## 2025-02-06 RX ADMIN — BUDESONIDE AND FORMOTEROL FUMARATE DIHYDRATE 2 PUFF: 160; 4.5 AEROSOL RESPIRATORY (INHALATION) at 08:23

## 2025-02-06 RX ADMIN — METHOCARBAMOL 750 MG: 750 TABLET ORAL at 18:33

## 2025-02-06 RX ADMIN — ACETAMINOPHEN 1000 MG: 500 TABLET ORAL at 20:25

## 2025-02-06 RX ADMIN — SODIUM CHLORIDE, PRESERVATIVE FREE 40 MG: 5 INJECTION INTRAVENOUS at 14:25

## 2025-02-06 RX ADMIN — FUROSEMIDE 20 MG: 10 INJECTION, SOLUTION INTRAMUSCULAR; INTRAVENOUS at 18:33

## 2025-02-06 RX ADMIN — METHOCARBAMOL 750 MG: 750 TABLET ORAL at 20:25

## 2025-02-06 RX ADMIN — ONDANSETRON 8 MG: 2 INJECTION INTRAMUSCULAR; INTRAVENOUS at 20:25

## 2025-02-06 RX ADMIN — FUROSEMIDE 20 MG: 10 INJECTION, SOLUTION INTRAMUSCULAR; INTRAVENOUS at 09:21

## 2025-02-06 RX ADMIN — ONDANSETRON 8 MG: 2 INJECTION INTRAMUSCULAR; INTRAVENOUS at 02:10

## 2025-02-06 RX ADMIN — GABAPENTIN 300 MG: 300 CAPSULE ORAL at 20:25

## 2025-02-06 RX ADMIN — HYDROMORPHONE HYDROCHLORIDE 0.5 MG: 1 INJECTION, SOLUTION INTRAMUSCULAR; INTRAVENOUS; SUBCUTANEOUS at 11:45

## 2025-02-06 RX ADMIN — GABAPENTIN 300 MG: 300 CAPSULE ORAL at 09:21

## 2025-02-06 RX ADMIN — METHOCARBAMOL 750 MG: 750 TABLET ORAL at 09:21

## 2025-02-06 RX ADMIN — ENOXAPARIN SODIUM 40 MG: 100 INJECTION SUBCUTANEOUS at 09:23

## 2025-02-06 RX ADMIN — SODIUM CHLORIDE, PRESERVATIVE FREE 10 ML: 5 INJECTION INTRAVENOUS at 09:22

## 2025-02-06 RX ADMIN — SODIUM CHLORIDE, PRESERVATIVE FREE 40 MG: 5 INJECTION INTRAVENOUS at 02:10

## 2025-02-06 RX ADMIN — GABAPENTIN 300 MG: 300 CAPSULE ORAL at 14:26

## 2025-02-06 RX ADMIN — ANTACID TABLETS 500 MG: 500 TABLET, CHEWABLE ORAL at 05:38

## 2025-02-06 RX ADMIN — SODIUM CHLORIDE, PRESERVATIVE FREE 10 ML: 5 INJECTION INTRAVENOUS at 20:26

## 2025-02-06 RX ADMIN — HYDROMORPHONE HYDROCHLORIDE 0.5 MG: 1 INJECTION, SOLUTION INTRAMUSCULAR; INTRAVENOUS; SUBCUTANEOUS at 16:12

## 2025-02-06 RX ADMIN — ONDANSETRON 8 MG: 2 INJECTION INTRAMUSCULAR; INTRAVENOUS at 06:55

## 2025-02-06 ASSESSMENT — PAIN SCALES - GENERAL
PAINLEVEL_OUTOF10: 5
PAINLEVEL_OUTOF10: 5
PAINLEVEL_OUTOF10: 7
PAINLEVEL_OUTOF10: 8
PAINLEVEL_OUTOF10: 6

## 2025-02-06 ASSESSMENT — PAIN DESCRIPTION - LOCATION: LOCATION: ABDOMEN

## 2025-02-06 NOTE — PLAN OF CARE
Problem: Discharge Planning  Goal: Discharge to home or other facility with appropriate resources  2/6/2025 0419 by Katt Arenas RN  Outcome: Progressing  2/5/2025 1841 by Aarti Del Rosario RN  Outcome: Progressing     Problem: Safety - Adult  Goal: Free from fall injury  2/6/2025 0419 by Katt Arenas RN  Outcome: Progressing  2/5/2025 1841 by Aarti Del Rosario RN  Outcome: Progressing     Problem: Skin/Tissue Integrity  Goal: Skin integrity remains intact  Description: 1.  Monitor for areas of redness and/or skin breakdown  2.  Assess vascular access sites hourly  3.  Every 4-6 hours minimum:  Change oxygen saturation probe site  4.  Every 4-6 hours:  If on nasal continuous positive airway pressure, respiratory therapy assess nares and determine need for appliance change or resting period  2/6/2025 0419 by Katt Arenas RN  Outcome: Progressing  2/5/2025 1841 by Aarti Del Rosario RN  Outcome: Progressing     Problem: Pain  Goal: Verbalizes/displays adequate comfort level or baseline comfort level  2/6/2025 0419 by Katt Arenas RN  Outcome: Progressing  2/5/2025 1841 by Aarti Del Rosario RN  Outcome: Progressing     Problem: ABCDS Injury Assessment  Goal: Absence of physical injury  Outcome: Progressing     Problem: Respiratory - Adult  Goal: Achieves optimal ventilation and oxygenation  Outcome: Progressing  Flowsheets (Taken 2/5/2025 2055 by Bubba Vázquez, KATHY)  Achieves optimal ventilation and oxygenation:   Respiratory therapy support as indicated   Assess and instruct to report shortness of breath or any respiratory difficulty   Assess the need for suctioning and aspirate as needed   Encourage broncho-pulmonary hygiene including cough, deep breathe, incentive spirometry   Initiate smoking cessation protocol as indicated   Oxygen supplementation based on oxygen saturation or arterial blood gases   Position to facilitate oxygenation and minimize respiratory effort   Assess for changes in mentation and

## 2025-02-06 NOTE — PROGRESS NOTES
ICU PROGRESS NOTE    PATIENT NAME: Christiano Rider  MEDICAL RECORD NO. [id number]  DATE: 2/6/2025    HD: # 8    ACUTE DIAGNOSES/PLAN  Pain: Dilaudid PRN, Tylenol BEATRIZ, Sophie prn, gabapentin and robaxin BEATRIZ  Hx asthma: weaned off 1L NC Home Advair ordered   GI Bleed 2/2 small bowel mass  POD #6 s/p ex lap with small bowel resection, side to side anastomosis   GI for EGD and Colonoscopy 1/29 hiatal hernia polyps of body and fundus of stomach no bleeding, small internal hemorrhoids, mild diverticulosis, melenic stool within the terminal ileum no bleeding identified  CTA 1/29 with active GI bleeding within loop of SB in right mid abd, small liver lesions suspicious for hemangiomas and hepatic cyst  IR for embolization 1/29 no extrav identified, jejunal uniform-enhancing mass with early venous draining  Protonix 40 BID   Tolerating regular diet   Acute anemia likely 2/2 likely postoperative blood loss   DVT prophylaxis- resumed  Hgb: 7.7 (7.1, 7.6, 7.8)  Needs encourageent to be up walking   Some trace generalized edema with localized scrotal edema, laix BID, monitor e-lytes   Per heme/onc recs low risk GIST, needs to complete work up but likely will not need adjuvant therapy  Anticipate home 2/7/2025      Chief Complaint: \"I want to take a shower today\"    SUBJECTIVE    Christiano is doing well this AM, he is concerned about the amount of swelling, he feels his hgb is low from being over resuscitated. He report wanting to take a shower, he is requesting to wear his dressing in the shower. He states he does not need oxygen but would prefer it to be on one more night, he is not using it during the day. He had 1 black tarry stool yesterday, he feels the best he has felt after 8 days in the hospital. He is tolerating his regular diet       OBJECTIVE  VITALS:   Vitals:    02/06/25 0824   BP:    Pulse: 88   Resp: 14   Temp: 98.2 °F (36.8 °C)   SpO2: 95%       Physical Exam  Constitutional:       General: He is not in acute  drop in hgb, recent anastamosis     Reason for Exam: acute drop in hgb, recent anastamosis     FINDINGS:  Bilateral pleural effusions and consolidation in the lung bases is unchanged  since the recent study.     The small bowel dilation still present.  Contrast in the small bowel and the  colon.  Contrast is seen within the cecum but this was noted on the previous  study.  Anastomosis in the left upper abdomen.  Contrast is seen beyond this  level.  Negative for contrast extravasation.  No free fluid or free gas in  this region.     Kidneys are in the excretion phase.  Tiny amount of fluid in the right  perinephric space.  Hounsfield units of that of simple fluid.  This is  unchanged in appearance from the previous study.     No evidence for retroperitoneal hemorrhage.  Small amount of free fluid in  the pelvis.  No visualized contrast extravasation.     The bladder is filled with contrast and is unremarkable.     Edema in the subcutaneous tissues.  Bilateral hydrocele.  Clips in the  anterior abdominal wall.     IMPRESSION:  1. No evidence for contrast extravasation.  2. Small amount of free fluid in the pelvis.  No evidence for retroperitoneal  hematoma.  3. Bilateral pleural effusions and consolidation in the lung bases without  change.    STONE ROSE - CNP  2/6/2025, 9:58 AM

## 2025-02-06 NOTE — PROGRESS NOTES
02/05/25 2055   Care Plan - Respiratory Goals   Achieves optimal ventilation and oxygenation Respiratory therapy support as indicated;Assess and instruct to report shortness of breath or any respiratory difficulty;Assess the need for suctioning and aspirate as needed;Encourage broncho-pulmonary hygiene including cough, deep breathe, incentive spirometry;Initiate smoking cessation protocol as indicated;Oxygen supplementation based on oxygen saturation or arterial blood gases;Position to facilitate oxygenation and minimize respiratory effort;Assess for changes in mentation and behavior;Assess for changes in respiratory status

## 2025-02-06 NOTE — CARE COORDINATION
Transitional planning    Goal is home,Outpatient follow-up with oncology in 6 weeks, cont to monitor hemoglobin.

## 2025-02-06 NOTE — PROGRESS NOTES
Occupational Therapy  Occupational Therapy Daily Treatment Note  Facility/Department: [address] ONC/MED SURG   Patient Name: Christiano Rider        MRN: [id number]    : 1970    Date of Service: 2025    Past Medical History:  has a past medical history of Asthma, Discogenic syndrome, lumbar, EE (eosinophilic esophagitis), Gastritis, GERD (gastroesophageal reflux disease), Hematuria, Hiatal hernia, Male pattern baldness, Mandibular retrognathism, Maxillary hyperplasia, Penile lesion, Prepatellar bursitis, Prostatic congestion, Reactive airway disease, and Tension headache.  Past Surgical History:  has a past surgical history that includes Upper gastrointestinal endoscopy; Colonoscopy; Inguinal hernia repair (Right, 2013); Nose surgery; Ankle fracture surgery; Mandible surgery; Appendectomy; Abdomen surgery; Upper gastrointestinal endoscopy (2016); Elbow surgery; Upper gastrointestinal endoscopy (2019); Upper gastrointestinal endoscopy (2019); Endoscopy, colon, diagnostic; Colonoscopy (N/A, 2021); Upper gastrointestinal endoscopy (N/A, 2021); Upper gastrointestinal endoscopy (2023); Upper gastrointestinal endoscopy (N/A, 2025); Colonoscopy (N/A, 2025); Upper gastrointestinal endoscopy (N/A, 2025); IR EMBOLIZATION HEMORRHAGE (2025); Exploratory laparotomy w/ bowel resection (2025); and laparoscopy (N/A, 2025).    Discharge Recommendations  Discharge Recommendations: Patient would benefit from continued therapy after discharge  OT Equipment Recommendations  Equipment Needed: Yes  Mobility Devices: ADL Assistive Devices  ADL Assistive Devices: Shower Chair with back    Assessment  Performance deficits / Impairments: Decreased functional mobility ;Decreased ADL status;Decreased endurance;Decreased high-level IADLs;Decreased balance;Decreased safe awareness;Decreased strength;Decreased posture  Assessment: Pt limited by pain this date  None  Education Outcome: Verbalized understanding;Continued education needed    Goals  Short Term Goals  Time Frame for Short Term Goals: Patient will, by discharge  Short Term Goal 1: demo bed mobility at Mod I using log rolling technique  Short Term Goal 2: demo functional transfers/mobility using LRAD at Mod I to engage in ADLs safely  Short Term Goal 3: demo 10+ min of dynamic standing tolerance, reaching from various heights at SBA, to complete ADL/IADLs  Short Term Goal 4: demo LB ADLs at Mod I using AE PRN  Short Term Goal 5: demo toileting tasks at SBA using DME PRN  Short Term Goal 6: demo proper breathing technique during functional tasks to assist in pain mgmt with 0 VCs    Plan  Occupational Therapy Plan  Times Per Week: 3-5x/wk  Current Treatment Recommendations: Strengthening, Balance training, Functional mobility training, Endurance training, Safety education & training, Patient/Caregiver education & training, Self-Care / ADL, Positioning, Equipment evaluation, education, & procurement    Minutes  OT Individual Minutes  Time In: 1055  Time Out: 1133  Minutes: 38  Time Code Minutes   Timed Code Treatment Minutes: 38 Minutes    Electronically signed by PATRICIA Field/L on 2/6/25 at 12:19 PM EST

## 2025-02-06 NOTE — PROGRESS NOTES
Today's Date: 2/6/2025  Patient Name: Christiano Rider  Date of admission: 1/29/2025  6:43 PM  Patient's age: 54 y.o., 1970  Admission Dx: GI bleed [K92.2]    Reason for Consult: GIST  Requesting Physician: Abhilash Barrera MD    Chief Complaint: Abdominal pain    Interval Changes:  Patient seen and examined.  No overnight events  Denies any nausea.  Tolerating diet well  Morning labs reviewed  Hemoglobin stable 7.7, platelet 239    History of Present Illness:    The patient is a 54 y.o. male with medical history significant for GERD/eosinophilic esophagitis, asthma who is initially admitted to the [facility] for abdominal pain and blood in stool.  He was found to be anemic requiring blood transfusion.  GI was consulted and patient on the EGD and colonoscopy.  EGD showed 2 cm hiatal hernia with no active bleeding.  Colonoscopy did show melanotic stools.  So patient underwent repeat EGD which showed blood in the terminal part.  CTA was obtained which was concerning for small bowel hemorrhage and he was transferred to [facility] for IR guided embolization.  He received multiple transfusions in between due to acute blood loss anemia.  He did undergo IR guided visualization which showed a small bowel mass with possible AV shunting.  General surgery was consulted and patient underwent diagnostic exploratory laparotomy with resection of a 3x3 cm polypoidal extraluminal mass.  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  Mitotic rate 1 per 5 mm².  CD1 1 7 positive DOG1 positive     Past Medical History:   has a past medical history of Asthma, Discogenic syndrome, lumbar, EE (eosinophilic esophagitis), Gastritis, GERD (gastroesophageal reflux disease), Hematuria, Hiatal hernia, Male pattern baldness, Mandibular retrognathism, Maxillary hyperplasia, Penile lesion, Prepatellar bursitis,

## 2025-02-06 NOTE — PROGRESS NOTES
Physical Therapy  Facility/Department: [address] ONC/MED SURG   Physical Therapy Daily Treatment Note    Patient Name: Christiano Rider        MRN: [id number]    : 1970    Date of Service: 2025    No chief complaint on file.    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  Past Surgical History:  has a past surgical history that includes Upper gastrointestinal endoscopy; Colonoscopy; Inguinal hernia repair (Right, 2013); Nose surgery; Ankle fracture surgery; Mandible surgery; Appendectomy; Abdomen surgery; Upper gastrointestinal endoscopy (2016); Elbow surgery; Upper gastrointestinal endoscopy (2019); Upper gastrointestinal endoscopy (2019); Endoscopy, colon, diagnostic; Colonoscopy (N/A, 2021); Upper gastrointestinal endoscopy (N/A, 2021); Upper gastrointestinal endoscopy (2023); Upper gastrointestinal endoscopy (N/A, 2025); Colonoscopy (N/A, 2025); Upper gastrointestinal endoscopy (N/A, 2025); IR EMBOLIZATION HEMORRHAGE (2025); Exploratory laparotomy w/ bowel resection (2025); and laparoscopy (N/A, 2025).    Discharge Recommendations     PT Equipment Recommendations  Equipment Needed: Yes  Mobility Devices: Walker  Walker: Rolling    Assessment  Body Structures, Functions, Activity Limitations Requiring Skilled Therapeutic Intervention: Decreased functional mobility ;Decreased ADL status;Decreased body mechanics;Decreased strength;Decreased high-level IADLs;Decreased balance;Decreased endurance;Decreased safe awareness;Decreased coordination;Increased pain;Decreased posture  Assessment: Pt performs BM  Therapeutic activities, Home exercise program    Goals  Short Term Goals  Time Frame for Short Term Goals: 14 visits  Short Term Goal 1: Complete transfers independently  Short Term Goal 2: Complete 300 ft of gait independently  Short Term Goal 3: Complete 7 steps with one handrail and mod I  Short Term Goal 4: Participate in 30 minutes of therapy to promote endurance    Minutes  PT Individual Minutes  Time In: 1534  Time Out: 1559  Minutes: 25  Time Code Minutes  Timed Code Treatment Minutes: 23 Minutes    Electronically signed by Julio C Larson PTA on 2/6/25 at 4:32 PM EST

## 2025-02-07 VITALS
SYSTOLIC BLOOD PRESSURE: 109 MMHG | RESPIRATION RATE: 12 BRPM | BODY MASS INDEX: 26.06 KG/M2 | HEART RATE: 97 BPM | TEMPERATURE: 98.3 F | HEIGHT: 74 IN | OXYGEN SATURATION: 97 % | WEIGHT: 203.04 LBS | DIASTOLIC BLOOD PRESSURE: 65 MMHG

## 2025-02-07 LAB
CA-I BLD-SCNC: 1.18 MMOL/L (ref 1.13–1.33)
HCT VFR BLD AUTO: 24.8 % (ref 40.7–50.3)
HCT VFR BLD AUTO: 24.9 % (ref 40.7–50.3)
HGB BLD-MCNC: 7.6 G/DL (ref 13–17)
HGB BLD-MCNC: 7.7 G/DL (ref 13–17)
MAGNESIUM SERPL-MCNC: 2.2 MG/DL (ref 1.6–2.6)
PHOSPHATE SERPL-MCNC: 4 MG/DL (ref 2.5–4.5)

## 2025-02-07 PROCEDURE — 2580000003 HC RX 258

## 2025-02-07 PROCEDURE — 85014 HEMATOCRIT: CPT

## 2025-02-07 PROCEDURE — 6360000002 HC RX W HCPCS

## 2025-02-07 PROCEDURE — 85018 HEMOGLOBIN: CPT

## 2025-02-07 PROCEDURE — 99232 SBSQ HOSP IP/OBS MODERATE 35: CPT | Performed by: INTERNAL MEDICINE

## 2025-02-07 PROCEDURE — 82330 ASSAY OF CALCIUM: CPT

## 2025-02-07 PROCEDURE — 2500000003 HC RX 250 WO HCPCS: Performed by: STUDENT IN AN ORGANIZED HEALTH CARE EDUCATION/TRAINING PROGRAM

## 2025-02-07 PROCEDURE — 99024 POSTOP FOLLOW-UP VISIT: CPT | Performed by: PHYSICIAN ASSISTANT

## 2025-02-07 PROCEDURE — 94761 N-INVAS EAR/PLS OXIMETRY MLT: CPT

## 2025-02-07 PROCEDURE — 6370000000 HC RX 637 (ALT 250 FOR IP)

## 2025-02-07 PROCEDURE — 94640 AIRWAY INHALATION TREATMENT: CPT

## 2025-02-07 PROCEDURE — 2700000000 HC OXYGEN THERAPY PER DAY

## 2025-02-07 PROCEDURE — 83735 ASSAY OF MAGNESIUM: CPT

## 2025-02-07 PROCEDURE — 84100 ASSAY OF PHOSPHORUS: CPT

## 2025-02-07 PROCEDURE — 99024 POSTOP FOLLOW-UP VISIT: CPT | Performed by: SURGERY

## 2025-02-07 PROCEDURE — 6360000002 HC RX W HCPCS: Performed by: STUDENT IN AN ORGANIZED HEALTH CARE EDUCATION/TRAINING PROGRAM

## 2025-02-07 PROCEDURE — 36415 COLL VENOUS BLD VENIPUNCTURE: CPT

## 2025-02-07 RX ORDER — FUROSEMIDE 20 MG/1
20 TABLET ORAL DAILY
Status: DISCONTINUED | OUTPATIENT
Start: 2025-02-07 | End: 2025-02-07 | Stop reason: HOSPADM

## 2025-02-07 RX ORDER — METHOCARBAMOL 750 MG/1
750 TABLET, FILM COATED ORAL 4 TIMES DAILY PRN
Qty: 30 TABLET | Refills: 0 | Status: SHIPPED | OUTPATIENT
Start: 2025-02-07 | End: 2025-02-17

## 2025-02-07 RX ORDER — ONDANSETRON 4 MG/1
4 TABLET, ORALLY DISINTEGRATING ORAL 3 TIMES DAILY PRN
Qty: 21 TABLET | Refills: 0 | Status: SHIPPED | OUTPATIENT
Start: 2025-02-07

## 2025-02-07 RX ORDER — OXYCODONE HYDROCHLORIDE 5 MG/1
5 TABLET ORAL EVERY 6 HOURS PRN
Qty: 15 TABLET | Refills: 0 | Status: SHIPPED | OUTPATIENT
Start: 2025-02-07 | End: 2025-02-11

## 2025-02-07 RX ORDER — FUROSEMIDE 20 MG/1
20 TABLET ORAL DAILY PRN
Qty: 3 TABLET | Refills: 0 | Status: SHIPPED | OUTPATIENT
Start: 2025-02-07

## 2025-02-07 RX ORDER — DOCUSATE SODIUM 100 MG/1
100 CAPSULE, LIQUID FILLED ORAL 2 TIMES DAILY PRN
Qty: 60 CAPSULE | Refills: 0 | Status: SHIPPED | OUTPATIENT
Start: 2025-02-07 | End: 2025-03-09

## 2025-02-07 RX ORDER — PANTOPRAZOLE SODIUM 40 MG/1
40 TABLET, DELAYED RELEASE ORAL
Status: DISCONTINUED | OUTPATIENT
Start: 2025-02-07 | End: 2025-02-07 | Stop reason: HOSPADM

## 2025-02-07 RX ADMIN — SODIUM CHLORIDE, PRESERVATIVE FREE 40 MG: 5 INJECTION INTRAVENOUS at 01:11

## 2025-02-07 RX ADMIN — SODIUM CHLORIDE, PRESERVATIVE FREE 10 ML: 5 INJECTION INTRAVENOUS at 09:15

## 2025-02-07 RX ADMIN — HYDROMORPHONE HYDROCHLORIDE 0.5 MG: 1 INJECTION, SOLUTION INTRAMUSCULAR; INTRAVENOUS; SUBCUTANEOUS at 03:54

## 2025-02-07 RX ADMIN — PROCHLORPERAZINE EDISYLATE 10 MG: 5 INJECTION INTRAMUSCULAR; INTRAVENOUS at 03:59

## 2025-02-07 RX ADMIN — ONDANSETRON 8 MG: 2 INJECTION INTRAMUSCULAR; INTRAVENOUS at 01:11

## 2025-02-07 RX ADMIN — BUDESONIDE AND FORMOTEROL FUMARATE DIHYDRATE 2 PUFF: 160; 4.5 AEROSOL RESPIRATORY (INHALATION) at 08:32

## 2025-02-07 RX ADMIN — ONDANSETRON 8 MG: 2 INJECTION INTRAMUSCULAR; INTRAVENOUS at 09:15

## 2025-02-07 RX ADMIN — ENOXAPARIN SODIUM 40 MG: 100 INJECTION SUBCUTANEOUS at 09:15

## 2025-02-07 ASSESSMENT — PAIN SCALES - GENERAL
PAINLEVEL_OUTOF10: 5
PAINLEVEL_OUTOF10: 4

## 2025-02-07 NOTE — PROGRESS NOTES
ICU PROGRESS NOTE    PATIENT NAME: Chrsitiano Rider  MEDICAL RECORD NO. [id number]  DATE: 2/7/2025    HD: # 9    ACUTE DIAGNOSES/PLAN  Pain: Dilaudid PRN, Tylenol BEATRIZ, Sophie prn, gabapentin and robaxin BEATRIZ  Hx asthma: weaned off 1L NC Home Advair ordered   GI Bleed 2/2 small bowel mass  POD #6 s/p ex lap with small bowel resection, side to side anastomosis   GI for EGD and Colonoscopy 1/29 hiatal hernia polyps of body and fundus of stomach no bleeding, small internal hemorrhoids, mild diverticulosis, melenic stool within the terminal ileum no bleeding identified  CTA 1/29 with active GI bleeding within loop of SB in right mid abd, small liver lesions suspicious for hemangiomas and hepatic cyst  IR for embolization 1/29 no extrav identified, jejunal uniform-enhancing mass with early venous draining  Protonix 40 BID   Tolerating regular diet   Acute anemia likely 2/2 likely postoperative blood loss   DVT prophylaxis- resumed  Hgb: 7.7 (7.1, 7.6, 7.8)  Needs encourageent to be up walking   Some trace generalized edema with localized scrotal edema, laix BID, monitor e-lytes   Per heme/onc recs low risk GIST, needs to complete work up but likely will not need adjuvant therapy  Anticipate home 2/7/2025      Chief Complaint: \"I want to take a shower today\"    SUBJECTIVE    Christiano is doing well this AM, he is concerned about the amount of swelling, he feels his hgb is low from being over resuscitated. He report wanting to take a shower, he is requesting to wear his dressing in the shower. He states he does not need oxygen but would prefer it to be on one more night, he is not using it during the day. He had 1 black tarry stool yesterday, he feels the best he has felt after 8 days in the hospital. He is tolerating his regular diet       OBJECTIVE  VITALS:   Vitals:    02/07/25 1152   BP: 102/63   Pulse: 91   Resp: 12   Temp: 98.3 °F (36.8 °C)   SpO2: 97%       Physical Exam  Constitutional:       General: He is not in

## 2025-02-07 NOTE — PROGRESS NOTES
CLINICAL PHARMACY NOTE: MEDS TO BEDS    Total # of Prescriptions Filled: 4   The following medications were delivered to the patient:  Zofran  Dok  Methocarbamol  oxycodone    Additional Documentation:

## 2025-02-07 NOTE — PROGRESS NOTES
ICU PROGRESS NOTE    PATIENT NAME: Christiano Rider  MEDICAL RECORD NO. [id number]  DATE: 2/7/2025    HD: # 9    ACUTE DIAGNOSES/PLAN  Pain: Tylenol BEATRIZ, Sophie prn, gabapentin and robaxin BEATRIZ  Hx asthma: weaned off 1L NC Home Advair ordered   GI Bleed 2/2 small bowel mass  POD #8 s/p ex lap with small bowel resection, side to side anastomosis   GI for EGD and Colonoscopy 1/29 hiatal hernia polyps of body and fundus of stomach no bleeding, small internal hemorrhoids, mild diverticulosis, melenic stool within the terminal ileum no bleeding identified  CTA 1/29 with active GI bleeding within loop of SB in right mid abd, small liver lesions suspicious for hemangiomas and hepatic cyst  IR for embolization 1/29 no extrav identified, jejunal uniform-enhancing mass with early venous draining  Protonix 40 BID   Tolerating regular diet   Acute anemia likely 2/2 likely postoperative blood loss   DVT prophylaxis- resumed  Hgb: remains stable   Some trace generalized edema with localized scrotal edema, laix BID, monitor e-lytes   Per heme/onc recs low risk GIST, needs to complete work up but likely will not need adjuvant therapy, follow up in 6 weeks  Anticipate discharge home later today      Chief Complaint: \"Feeling ok\"    SUBJECTIVE  No acute events overnight. He claims his alarms went off and he had to be placed back on oxygen overnight. He denies difficulty breathing and feels his swelling has improved but not completely. He is tolerating diet and having bowel movements. His last bowel movement was only slightly red.       OBJECTIVE  VITALS:   Vitals:    02/07/25 1152   BP: 102/63   Pulse: 91   Resp: 12   Temp: 98.3 °F (36.8 °C)   SpO2: 97%       Physical Exam  Constitutional:       General: He is not in acute distress.     Appearance: He is not ill-appearing or toxic-appearing.   HENT:      Head: Normocephalic and atraumatic.      Nose: Nose normal.      Mouth/Throat:      Mouth: Mucous membranes are moist.       Pharynx: Oropharynx is clear.   Eyes:      Extraocular Movements: Extraocular movements intact.   Cardiovascular:      Rate and Rhythm: Normal rate.   Pulmonary:      Effort: Pulmonary effort is normal. No respiratory distress.   Abdominal:      General: There is no distension.      Palpations: Abdomen is soft.      Tenderness: There is abdominal tenderness.      Comments: Abdominal binder in place, incision CDI   Musculoskeletal:         General: Normal range of motion.   Skin:     General: Skin is warm and dry.   Neurological:      General: No focal deficit present.      Mental Status: He is alert and oriented to person, place, and time.   Psychiatric:         Mood and Affect: Mood normal.         Behavior: Behavior normal.       LAB:  CBC:   Recent Labs     02/05/25  0736 02/06/25  0309   WBC 7.8 7.8   HGB 7.7* 7.7*   HCT 24.1* 23.5*   MCV 90.9 89.0    239     BMP:   Recent Labs     02/05/25  0736 02/06/25  0309    138   K 3.8 4.0    104   CO2 26 26   BUN 12 11   CREATININE 0.8 0.8   GLUCOSE 117* 111*     RADIOLOGY:  Narrative & Impression  EXAMINATION:  CT OF THE ABDOMEN AND PELVIS WITHOUT CONTRAST 2/2/2025 8:46 pm     TECHNIQUE:  CT of the abdomen and pelvis was performed without the administration of  intravenous contrast. Multiplanar reformatted images are provided for review.  Automated exposure control, iterative reconstruction, and/or weight based  adjustment of the mA/kV was utilized to reduce the radiation dose to as low  as reasonably achievable.     COMPARISON:  Earlier today     HISTORY:  ORDERING SYSTEM PROVIDED HISTORY: acute drop in hgb, recent anastamosis  TECHNOLOGIST PROVIDED HISTORY:  acute drop in hgb, recent anastamosis     Reason for Exam: acute drop in hgb, recent anastamosis     FINDINGS:  Bilateral pleural effusions and consolidation in the lung bases is unchanged  since the recent study.     The small bowel dilation still present.  Contrast in the small bowel and

## 2025-02-07 NOTE — PROGRESS NOTES
small dissection of the right ANGEL seen on previous CTA but no other dissection or extravasation. 4. Superior mesenteric and jejunal angiography demonstrating an oblong, uniform-enhancing mass with early venous draining but no contrast extravasation. 5. Arteriotomy closure of the left CFA with a Mynx device.  It initially appear to have held; however, additional digital pressure was necessary for hemostasis.  Please keep the leg straight for 6 hours. 6. Recommend surgical consult. Case discussed with [facility] shortly after 9 PM.     CTA ABDOMEN PELVIS W WO CONTRAST    Result Date: 1/29/2025  EXAMINATION: CTA OF THE ABDOMEN AND PELVIS WITH AND WITHOUT CONTRAST 1/29/2025 2:24 pm: TECHNIQUE: CTA of the abdomen and pelvis was performed without and with the administration of intravenous contrast. Multiplanar reformatted images are provided for review.  MIP images are provided for review. Automated exposure control, iterative reconstruction, and/or weight based adjustment of the mA/kV was utilized to reduce the radiation dose to as low as reasonably achievable. COMPARISON: CT 01/28/2025. HISTORY: ORDERING SYSTEM PROVIDED HISTORY: possible bleeding TECHNOLOGIST PROVIDED HISTORY: possible bleeding FINDINGS: CTA ABDOMEN: Vascular: Mild multifocal plaque involves the abdominal aorta which is normal in caliber.  There is no dissection or stenosis.  There is normal patency of the mesenteric and renal arteries.  There are 2 right renal arteries.  No peripheral aneurysm or vascular occlusion is identified. Lower chest: There is mild dependent atelectasis in the lower lobes.  There is no pleural effusion. Organs: Low-attenuation lesion with peripheral nodular enhancement in the right hepatic lobe measures up to 1.8 cm.  Lesion with similar characteristics in segment 8 measures up to 6 mm.  There is a small cyst in segment 7.  The gallbladder appears normal.  There is no biliary dilatation. The portal vein is patent.  The spleen,  CT OF THE ABDOMEN AND PELVIS WITHOUT CONTRAST 1/28/2025 9:44 am TECHNIQUE: CT of the abdomen and pelvis was performed without the administration of intravenous contrast. Multiplanar reformatted images are provided for review. Automated exposure control, iterative reconstruction, and/or weight based adjustment of the mA/kV was utilized to reduce the radiation dose to as low as reasonably achievable. COMPARISON: 05/11/2016 HISTORY: ORDERING SYSTEM PROVIDED HISTORY: Abdominal pain TECHNOLOGIST PROVIDED HISTORY: Abdominal pain Reason for Exam: Abdominal pain. Black tarry stool noted yesterday. FINDINGS: Lower Chest: The visualized heart and lungs show no acute abnormalities. Organs: The unenhanced liver, spleen, pancreas, adrenal glands and gallbladder show no significant abnormalities.  Punctate right and 2 mm left nonobstructing renal calculus. GI/Bowel: There is limited evaluation due to absence of oral contrast. Stomach collapsed otherwise unremarkable the small bowel loops show no significant abnormalities.  Appendix not visualized.  No evidence for appendicitis.  No significant abnormalities identified in the colon.  No acute process. Pelvis: No acute process.  Unremarkable urinary bladder. Peritoneum/Retroperitoneum: No free fluid.  No lymphadenopathy.  Minimal atherosclerotic disease.  Unremarkable ureters. Bones/Soft Tissues: Small left-sided diaphragmatic hernia with a small amount of peritoneal fat extending through the defect.  No acute bony abnormality.     1. No acute infective or inflammatory process. 2. No suspicious mass. 3. No findings to account for patient's symptoms. 4. Punctate right and 2 mm left nonobstructing nephrolithiasis.        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

## 2025-02-07 NOTE — DISCHARGE INSTRUCTIONS
Discharge Instructions for General Surgery    No lifting above 10Ibs for next 2 weeks.  No soaking in bathtubs or submerging yourself in water for 4 weeks  Resume activity as tolerated, No operating heavy equipment while using narcotics Wash incision gently with soap and water, pat dry.  If steri-strips or surgical glue in place wash gently and leave in place until the glue or strips fall off. (Do not pull/tug)    F/u in trauma/acute care surgery clinic in 1-2 weeks Call your doctor for the following:  Chills  Temperature greater than 101  Pain that is not tolerable despite taking pain medicine as ordered There is increased swelling, redness or warmth at surgical site There is increased drainage or bleeding from surgical site    Recommended diet: regular diet  Depending on your injuries, your doctor may want you to follow a specific diet. Some pain medicine can cause constipation . To avoid this problem:  Drink plenty of fluids.  Eat foods high in fiber , such as:  Whole grain cereals and bread  Fruits and vegtables   Legumes (eg, beans, lentils)      If you are still having problems, talk to your doctor about using laxatives or stool softeners.    General questions or concerns call [phone] for the General Surgery Clinic.  If needed, the clinic fax number is [phone]

## 2025-02-07 NOTE — FLOWSHEET NOTE
Patient's IV removed without complication.   Discharge teaching and instructions completed with patient using teachback method.   AVS reviewed.   Prescriptions delivered to patient from pharmacy.   Patient voiced understanding regarding prescriptions, follow up appointments, and care of self at home. All questions answered.  Discharged home with family.   Patient left with all belongings.

## 2025-02-07 NOTE — PLAN OF CARE
Problem: Discharge Planning  Goal: Discharge to home or other facility with appropriate resources  Outcome: Adequate for Discharge     Problem: Safety - Adult  Goal: Free from fall injury  Outcome: Adequate for Discharge     Problem: Skin/Tissue Integrity  Goal: Skin integrity remains intact  Description: 1.  Monitor for areas of redness and/or skin breakdown  2.  Assess vascular access sites hourly  3.  Every 4-6 hours minimum:  Change oxygen saturation probe site  4.  Every 4-6 hours:  If on nasal continuous positive airway pressure, respiratory therapy assess nares and determine need for appliance change or resting period  Outcome: Adequate for Discharge     Problem: Pain  Goal: Verbalizes/displays adequate comfort level or baseline comfort level  Outcome: Adequate for Discharge     Problem: ABCDS Injury Assessment  Goal: Absence of physical injury  Outcome: Adequate for Discharge     Problem: Respiratory - Adult  Goal: Achieves optimal ventilation and oxygenation  Outcome: Adequate for Discharge     Problem: Spiritual Struggle  Goal: Verbalizes spiritual struggle  Outcome: Adequate for Discharge  Goal: Gains new understanding/perception  Outcome: Adequate for Discharge  Goal: Growing sense of peace/hope  Outcome: Adequate for Discharge  Goal: Explore resources for additional follow up, post discharge  Outcome: Adequate for Discharge     Problem: Emotional Distress  Goal: Verbalization of thoughts and feelings  Outcome: Adequate for Discharge  Goal: Reduce evidence of anxiety/worry/anger  Outcome: Adequate for Discharge  Goal: Identifies/restores coping resources/skills  Outcome: Adequate for Discharge  Goal: Growing sense of peace/hope  Outcome: Adequate for Discharge  Goal: Explore resources for additional follow up, post discharge  Outcome: Adequate for Discharge     Problem: Life Adjustment  Goal: Verbalization of feelings regarding change/loss  Outcome: Adequate for Discharge  Goal: Finding meaning/purpose

## 2025-02-10 ENCOUNTER — HOSPITAL ENCOUNTER (OUTPATIENT)
Facility: CLINIC | Age: 55
Discharge: HOME | End: 2025-02-10
Payer: COMMERCIAL

## 2025-02-10 ENCOUNTER — CARE COORDINATION (OUTPATIENT)
Dept: CARE COORDINATION | Age: 55
End: 2025-02-10

## 2025-02-10 DIAGNOSIS — K92.2 SMALL INTESTINAL HEMORRHAGE: Primary | ICD-10-CM

## 2025-02-10 DIAGNOSIS — K92.2 SMALL INTESTINAL HEMORRHAGE: ICD-10-CM

## 2025-02-10 LAB
EKG ATRIAL RATE: 113 BPM
EKG P AXIS: 34 DEGREES
EKG P-R INTERVAL: 120 MS
EKG Q-T INTERVAL: 326 MS
EKG QRS DURATION: 94 MS
EKG QTC CALCULATION (BAZETT): 447 MS
EKG R AXIS: 39 DEGREES
EKG T AXIS: 64 DEGREES
EKG VENTRICULAR RATE: 113 BPM
HCT VFR BLD AUTO: 31.3 % (ref 41–53)
HGB BLD-MCNC: 10.5 G/DL (ref 13.5–17.5)

## 2025-02-10 PROCEDURE — 85018 HEMOGLOBIN: CPT

## 2025-02-10 PROCEDURE — 36415 COLL VENOUS BLD VENIPUNCTURE: CPT

## 2025-02-10 PROCEDURE — 85014 HEMATOCRIT: CPT

## 2025-02-10 NOTE — CARE COORDINATION
Care Transitions Note    Initial Call - Call within 2 business days of discharge: Yes    Attempted to reach patient for transitions of care follow up. Unable to reach patient.    Outreach Attempts:   Unable to leave message.     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 Admission (Discharged) [facility] Abhilash Barrera MD            Was this an external facility discharge? No    Follow Up Appointment:   Patient does not have a follow up appointment scheduled at time of call.  Message routed to PCP office for HFU appointment  Future Appointments         Provider Specialty Dept Phone    2025 1:00 PM SCHEDULE, [facility] General Surgery [phone]    2025 3:15 PM Martir Matute MD Oncology [phone]    3/20/2025 3:30 PM Elan Shields MD Family Medicine [phone]            Plan for follow-up on next business day.      SUSIE SANTORO, RN

## 2025-02-10 NOTE — PROGRESS NOTES
CLINICAL PHARMACY NOTE: MEDS TO BEDS    Total # of Prescriptions Filled: 1   The following medications were delivered to the patient:  furosemide    Additional Documentation:

## 2025-02-10 NOTE — TELEPHONE ENCOUNTER
The patient would like to see his specialists and complete his PET scan before seeing Dr Shields for hospital follow up.  He will keep his appointment to see Dr Shields on 03/20/25 as of today.

## 2025-02-11 ENCOUNTER — CARE COORDINATION (OUTPATIENT)
Dept: CARE COORDINATION | Age: 55
End: 2025-02-11

## 2025-02-11 ENCOUNTER — TELEPHONE (OUTPATIENT)
Dept: ONCOLOGY | Age: 55
End: 2025-02-11

## 2025-02-11 NOTE — CARE COORDINATION
Care Transitions Note    Initial Call - Call within 2 business days of discharge: Yes    Attempted to reach patient for transitions of care follow up. Unable to reach patient.    Outreach Attempts:   Multiple attempts to contact patient at phone numbers on file.     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 [facility] Abhilash Barrera MD            Was this an external facility discharge? No    Follow Up Appointment:   Patient does not have a follow up appointment scheduled at time of call.  PCP office to call pt and he declined scheduling appointment at this time.   Future Appointments         Provider Specialty Dept Phone    2025 1:00 PM SCHEDULE, [facility] General Surgery [phone]    2025 3:15 PM Martir Matute MD Oncology [phone]    3/20/2025 3:30 PM Elan Shields MD Family Medicine [phone]            No further follow-up call indicated     SUSIE SANTORO RN

## 2025-02-11 NOTE — DISCHARGE SUMMARY
DISCHARGE SUMMARY:    PATIENT NAME:  Christiano Rider  YOB: 1970  MEDICAL RECORD NO. [id number]  DATE: 02/11/25  PRIMARY CARE PHYSICIAN: Elan Shields MD  ADMIT DATE:  1/29/2025    DISCHARGE DATE:  2/7/2025  DISPOSITION:  stable/home  ADMITTING DIAGNOSIS:   GI bleed    DIAGNOSIS:   Patient Active Problem List   Diagnosis    Asthma    Discogenic syndrome, lumbar    Prepatellar bursitis    Tension headache    Reactive airway disease    Male pattern baldness    Hematuria    Prostatic congestion    Maxillary hyperplasia    Mandibular retrognathism    Right inguinal hernia    GERD (gastroesophageal reflux disease)    Vitamin D deficiency    Gastritis    Hiatal hernia    EE (eosinophilic esophagitis)    Polyp of rectum    Stress    Occupational exposure to chemicals    Esophageal stricture    Current use of proton pump inhibitor    Small intestinal hemorrhage    Hypokalemia    Acute blood loss anemia    Small bowel mass    GI bleed    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 male who was admitted on 1/29/2025  Hospital Course:  1/29/2025: +3u pRBC at [facility], EGD x2 with no sign of active bleed, colonoscopy with poor prep and blood, but no signs of active bleed. IR attempted to embolize, but found no active extravasation  1/30: OR for mini-laparotomy, small bowel mass resection and primary anastomosis, 3U PRBC 1 FFP, 1plt,  Protonix IV->BD PO, rash itchy, 25 benadryl, fent PCA  1/31: island dressing saturated, changed ovn, NGT, Zosyn complete, transfer to step-down, 250cc per NGT during day  2/1: DC PCA at patient's request, no bowel function  2/2: HGB 4.8 (8.0) -> 2u prbc, CTA A/P w/o any active extravasation, Transfer to TICU, CT w/ PO w/o contrast

## 2025-02-11 NOTE — TELEPHONE ENCOUNTER
Pt aware of appt. Pt also aware if [place] is under a level 3 office will be closed.    Electronically signed by Ludmila Wolfe on 2/11/2025 at 3:15 PM

## 2025-02-12 NOTE — PROGRESS NOTES
Gastrointestinal stromal tumor, spindle cell type  Tumor Size (based on clinicoradiologic estimate): Greatest dimension  in Centimeters (cm): 3.0 cm  Mitotic Rate: Specify mitotic rate per 5 mm2: 1 mitoses per 5 mm2  Histologic Grade: G1, low grade (mitotic rate less than or equal to 5  per 5 mm2)  Necrosis: Not identified  Treatment Effect: No known presurgical therapy  Risk Assessment: Low risk    MARGINS    Margin Status: All margins negative for GIST  Closest Margin to GIST: Other (specify): Mucosal (unoriented specimen)    Distance from GIST to Closest Margin: Exact distance in cm: 3.6 cm    REGIONAL LYMPH NODES    Regional Lymph Node Status: Regional lymph nodes present  All regional lymph nodes negative for tumor  Number of Lymph Nodes Examined: Exact number (specify): 5    DISTANT METASTASIS    Distant Site(s) Involved:  Not applicable    pTNM CLASSIFICATION (AJCC 8th Edition)  Reporting of pT, pN, and (when applicable) pM categories is based on  information available to the pathologist at the time the report is  issued. As per the AJCC (Chapter 1, 8th Ed.) it is the managing  physician's responsibility to establish the final pathologic stage  based upon all pertinent information, including but potentially not  limited to this pathology report.    Modified Classification  Not applicable    pT Category  pT2: Tumor more than 2 cm but not more than 5 cm    T Suffix  Not applicable    pN Category  pN0: No regional lymph node metastasis    pM Category  Not applicable - pM cannot be determined from the submitted  specimen(s)    SPECIAL STUDIES    Immunohistochemical Studies  KIT ()  KIT (): Positive  DOG1 (ANO1)  DOG1 (ANO1): Positive    Block for additional testing: A6    Processing Lab:  [address] [id number]  Interpretation Performed at [address] [address]    SURGICAL PATHOLOGY CONSULTATION

## 2025-02-13 ENCOUNTER — OFFICE VISIT (OUTPATIENT)
Age: 55
End: 2025-02-13

## 2025-02-13 ENCOUNTER — HOSPITAL ENCOUNTER (OUTPATIENT)
Age: 55
Discharge: HOME | End: 2025-02-13
Payer: COMMERCIAL

## 2025-02-13 ENCOUNTER — TELEPHONE (OUTPATIENT)
Dept: ONCOLOGY | Age: 55
End: 2025-02-13

## 2025-02-13 ENCOUNTER — OFFICE VISIT (OUTPATIENT)
Dept: ONCOLOGY | Age: 55
End: 2025-02-13
Payer: COMMERCIAL

## 2025-02-13 VITALS
WEIGHT: 203 LBS | HEIGHT: 74 IN | SYSTOLIC BLOOD PRESSURE: 119 MMHG | DIASTOLIC BLOOD PRESSURE: 77 MMHG | HEART RATE: 115 BPM | BODY MASS INDEX: 26.05 KG/M2

## 2025-02-13 VITALS
HEART RATE: 102 BPM | TEMPERATURE: 98.1 F | SYSTOLIC BLOOD PRESSURE: 100 MMHG | BODY MASS INDEX: 21.83 KG/M2 | WEIGHT: 170 LBS | DIASTOLIC BLOOD PRESSURE: 70 MMHG | RESPIRATION RATE: 16 BRPM

## 2025-02-13 DIAGNOSIS — D64.9 ANEMIA, UNSPECIFIED TYPE: ICD-10-CM

## 2025-02-13 DIAGNOSIS — Z98.890 S/P EXPLORATORY LAPAROTOMY: Primary | ICD-10-CM

## 2025-02-13 DIAGNOSIS — C49.A3 GIST (GASTROINTESTINAL STROMAL TUMOR) OF SMALL BOWEL, MALIGNANT (HCC): Primary | ICD-10-CM

## 2025-02-13 DIAGNOSIS — E61.1 IRON DEFICIENCY: ICD-10-CM

## 2025-02-13 DIAGNOSIS — C49.A3 GIST (GASTROINTESTINAL STROMAL TUMOR) OF SMALL BOWEL, MALIGNANT (HCC): ICD-10-CM

## 2025-02-13 LAB
ALBUMIN SERPL-MCNC: 4 G/DL (ref 3.5–5.2)
ALP SERPL-CCNC: 62 U/L (ref 40–129)
ALT SERPL-CCNC: 69 U/L (ref 10–50)
ANION GAP SERPL CALCULATED.3IONS-SCNC: 11 MMOL/L (ref 9–16)
AST SERPL-CCNC: 18 U/L (ref 10–50)
BASOPHILS # BLD: 0.1 K/UL (ref 0–0.2)
BASOPHILS NFR BLD: 1 % (ref 0–2)
BILIRUB SERPL-MCNC: 0.4 MG/DL (ref 0–1.2)
BUN SERPL-MCNC: 13 MG/DL (ref 6–20)
CALCIUM SERPL-MCNC: 9.1 MG/DL (ref 8.6–10.4)
CHLORIDE SERPL-SCNC: 105 MMOL/L (ref 98–107)
CO2 SERPL-SCNC: 26 MMOL/L (ref 20–31)
CREAT SERPL-MCNC: 1 MG/DL (ref 0.7–1.2)
EOSINOPHIL # BLD: 0.2 K/UL (ref 0–0.4)
EOSINOPHILS RELATIVE PERCENT: 2 % (ref 0–4)
ERYTHROCYTE [DISTWIDTH] IN BLOOD BY AUTOMATED COUNT: 14.7 % (ref 11.5–14.9)
FERRITIN SERPL-MCNC: 25 NG/ML (ref 30–400)
FOLATE SERPL-MCNC: 9.6 NG/ML (ref 4.8–24.2)
GFR, ESTIMATED: 89 ML/MIN/1.73M2
GLUCOSE SERPL-MCNC: 107 MG/DL (ref 74–99)
HCT VFR BLD AUTO: 32.5 % (ref 41–53)
HGB BLD-MCNC: 10.6 G/DL (ref 13.5–17.5)
IRON SATN MFR SERPL: 4 % (ref 20–55)
IRON SERPL-MCNC: 16 UG/DL (ref 61–157)
LYMPHOCYTES NFR BLD: 1.4 K/UL (ref 1–4.8)
LYMPHOCYTES RELATIVE PERCENT: 13 % (ref 24–44)
MCH RBC QN AUTO: 28.2 PG (ref 26–34)
MCHC RBC AUTO-ENTMCNC: 32.5 G/DL (ref 31–37)
MCV RBC AUTO: 87 FL (ref 80–100)
MONOCYTES NFR BLD: 0.5 K/UL (ref 0.1–1.3)
MONOCYTES NFR BLD: 5 % (ref 1–7)
NEUTROPHILS NFR BLD: 79 % (ref 36–66)
NEUTS SEG NFR BLD: 9.4 K/UL (ref 1.3–9.1)
PLATELET # BLD AUTO: 523 K/UL (ref 150–450)
PMV BLD AUTO: 7.3 FL (ref 6–12)
POTASSIUM SERPL-SCNC: 4.1 MMOL/L (ref 3.7–5.3)
PROT SERPL-MCNC: 6.8 G/DL (ref 6.6–8.7)
RBC # BLD AUTO: 3.74 M/UL (ref 4.5–5.9)
SODIUM SERPL-SCNC: 142 MMOL/L (ref 136–145)
TIBC SERPL-MCNC: 433 UG/DL (ref 250–450)
UNSATURATED IRON BINDING CAPACITY: 417 UG/DL (ref 112–347)
VIT B12 SERPL-MCNC: 433 PG/ML (ref 232–1245)
WBC OTHER # BLD: 11.6 K/UL (ref 3.5–11)

## 2025-02-13 PROCEDURE — 82728 ASSAY OF FERRITIN: CPT

## 2025-02-13 PROCEDURE — 36415 COLL VENOUS BLD VENIPUNCTURE: CPT

## 2025-02-13 PROCEDURE — 99024 POSTOP FOLLOW-UP VISIT: CPT | Performed by: SURGERY

## 2025-02-13 PROCEDURE — 80053 COMPREHEN METABOLIC PANEL: CPT

## 2025-02-13 PROCEDURE — 85025 COMPLETE CBC W/AUTO DIFF WBC: CPT

## 2025-02-13 PROCEDURE — 82607 VITAMIN B-12: CPT

## 2025-02-13 PROCEDURE — 83550 IRON BINDING TEST: CPT

## 2025-02-13 PROCEDURE — 83540 ASSAY OF IRON: CPT

## 2025-02-13 PROCEDURE — 99211 OFF/OP EST MAY X REQ PHY/QHP: CPT | Performed by: INTERNAL MEDICINE

## 2025-02-13 PROCEDURE — 82746 ASSAY OF FOLIC ACID SERUM: CPT

## 2025-02-13 NOTE — TELEPHONE ENCOUNTER
AVS 2/13/25    Labs now   Ct pet in second week of march   Rv to be decided     PET CT Skull Base To Mid-Thigh  Scheduled for 3/10/2025    Labs ordered today  CBC with Auto Differential  Complete this on or around 2/13/2025.  Comprehensive Metabolic Panel  Complete this on or around 2/13/2025.  Ferritin  Complete this on or around 2/13/2025.  Iron and TIBC  Complete this on or around 2/13/2025.  Vitamin B12 & Folate  Complete this on or around 2/13/2025.     RV TBD    Labs to be done today    PT was given AVS and appt schedule    Electronically signed by Ludmila Wolfe on 2/13/2025 at 4:02 PM

## 2025-02-14 ENCOUNTER — TELEPHONE (OUTPATIENT)
Dept: ONCOLOGY | Age: 55
End: 2025-02-14

## 2025-02-14 PROBLEM — E61.1 IRON DEFICIENCY: Status: ACTIVE | Noted: 2025-02-14

## 2025-02-14 RX ORDER — SODIUM CHLORIDE 0.9 % (FLUSH) 0.9 %
5-40 SYRINGE (ML) INJECTION PRN
Status: CANCELLED | OUTPATIENT
Start: 2025-02-19

## 2025-02-14 RX ORDER — HEPARIN SODIUM (PORCINE) LOCK FLUSH IV SOLN 100 UNIT/ML 100 UNIT/ML
500 SOLUTION INTRAVENOUS PRN
Status: CANCELLED | OUTPATIENT
Start: 2025-02-19

## 2025-02-14 RX ORDER — SODIUM CHLORIDE 9 MG/ML
INJECTION, SOLUTION INTRAVENOUS CONTINUOUS
Status: CANCELLED | OUTPATIENT
Start: 2025-02-19

## 2025-02-14 RX ORDER — HYDROCORTISONE SODIUM SUCCINATE 100 MG/2ML
100 INJECTION INTRAMUSCULAR; INTRAVENOUS
Status: CANCELLED | OUTPATIENT
Start: 2025-02-19

## 2025-02-14 RX ORDER — ACETAMINOPHEN 325 MG/1
650 TABLET ORAL
Status: CANCELLED | OUTPATIENT
Start: 2025-02-19

## 2025-02-14 RX ORDER — DIPHENHYDRAMINE HYDROCHLORIDE 50 MG/ML
50 INJECTION INTRAMUSCULAR; INTRAVENOUS
Status: CANCELLED | OUTPATIENT
Start: 2025-02-19

## 2025-02-14 RX ORDER — FAMOTIDINE 10 MG/ML
20 INJECTION, SOLUTION INTRAVENOUS
Status: CANCELLED | OUTPATIENT
Start: 2025-02-19

## 2025-02-14 RX ORDER — ALBUTEROL SULFATE 90 UG/1
4 INHALANT RESPIRATORY (INHALATION) PRN
Status: CANCELLED | OUTPATIENT
Start: 2025-02-19

## 2025-02-14 RX ORDER — SODIUM CHLORIDE 9 MG/ML
5-250 INJECTION, SOLUTION INTRAVENOUS PRN
Status: CANCELLED | OUTPATIENT
Start: 2025-02-19

## 2025-02-14 RX ORDER — ONDANSETRON 2 MG/ML
8 INJECTION INTRAMUSCULAR; INTRAVENOUS
Status: CANCELLED | OUTPATIENT
Start: 2025-02-19

## 2025-02-14 RX ORDER — EPINEPHRINE 1 MG/ML
0.3 INJECTION, SOLUTION, CONCENTRATE INTRAVENOUS PRN
Status: CANCELLED | OUTPATIENT
Start: 2025-02-19

## 2025-02-14 NOTE — TELEPHONE ENCOUNTER
Name: Christiano Rider  : 1970  MRN: [id number]    Oncology Navigation- Initial Note:    Intake-  Contact Type: Telephone    Diagnosis: GI- malignant    Home Disposition: Lives with other who is able to assist    Patient needs and barriers to care: [place] Barriers Identified     Referral Source: Health Professional    Interventions-   General Interventions: none     Education/Screenings:  no     Currently on HRT: no     Referrals: none+     Biopsy site status: bowel     Smoking hx: never       Continuum of Care: Diagnosis/Active Treatment    Notes: Writer called pt and introduced self as navigator. Name and number provided and writer explained my role in his care moving forward. Pt lives with s.o. No barriers to care identified today.     Pt is 2 weeks post op post bowel resection. Pt recovering well and denies any issues. Pt did ask about his lab results from yesterday. Dr. Matute here in clinic reviewed labs and will order iron infusions. Pt informed and wishes to proceed with infusions at [facility]. . Will route this note to Willy  at [facility], to inform. Pt will have PET scan done mid March to allow appropriate time to heal. Pt prefers to continue his care with Dr. Chan at [facility]. Writer informed Dr. Chan regarding new pt.     Writer encourage pt to reach out with any needs or concerns, if not, I'd touch base with him soon     Electronically signed by Jael Mendez RN on 2025 at 12:01 PM

## 2025-02-19 ENCOUNTER — HOSPITAL ENCOUNTER (OUTPATIENT)
Dept: INFUSION THERAPY | Facility: MEDICAL CENTER | Age: 55
Discharge: HOME | End: 2025-02-19
Payer: COMMERCIAL

## 2025-02-19 VITALS
DIASTOLIC BLOOD PRESSURE: 77 MMHG | RESPIRATION RATE: 16 BRPM | TEMPERATURE: 97.9 F | SYSTOLIC BLOOD PRESSURE: 120 MMHG | HEART RATE: 91 BPM

## 2025-02-19 DIAGNOSIS — E61.1 IRON DEFICIENCY: Primary | ICD-10-CM

## 2025-02-19 PROCEDURE — 96366 THER/PROPH/DIAG IV INF ADDON: CPT

## 2025-02-19 PROCEDURE — 2580000003 HC RX 258: Performed by: INTERNAL MEDICINE

## 2025-02-19 PROCEDURE — 96365 THER/PROPH/DIAG IV INF INIT: CPT

## 2025-02-19 PROCEDURE — 6360000002 HC RX W HCPCS: Performed by: INTERNAL MEDICINE

## 2025-02-19 RX ORDER — SODIUM CHLORIDE 9 MG/ML
5-250 INJECTION, SOLUTION INTRAVENOUS PRN
Status: DISCONTINUED | OUTPATIENT
Start: 2025-02-19 | End: 2025-02-20 | Stop reason: HOSPADM

## 2025-02-19 RX ORDER — ONDANSETRON 2 MG/ML
8 INJECTION INTRAMUSCULAR; INTRAVENOUS
OUTPATIENT
Start: 2025-03-05

## 2025-02-19 RX ORDER — HEPARIN 100 UNIT/ML
500 SYRINGE INTRAVENOUS PRN
OUTPATIENT
Start: 2025-03-05

## 2025-02-19 RX ORDER — SODIUM CHLORIDE 9 MG/ML
5-250 INJECTION, SOLUTION INTRAVENOUS PRN
OUTPATIENT
Start: 2025-03-05

## 2025-02-19 RX ORDER — ACETAMINOPHEN 325 MG/1
650 TABLET ORAL
OUTPATIENT
Start: 2025-03-05

## 2025-02-19 RX ORDER — FAMOTIDINE 10 MG/ML
20 INJECTION, SOLUTION INTRAVENOUS
OUTPATIENT
Start: 2025-03-05

## 2025-02-19 RX ORDER — HYDROCORTISONE SODIUM SUCCINATE 100 MG/2ML
100 INJECTION INTRAMUSCULAR; INTRAVENOUS
OUTPATIENT
Start: 2025-03-05

## 2025-02-19 RX ORDER — SODIUM CHLORIDE 0.9 % (FLUSH) 0.9 %
5-40 SYRINGE (ML) INJECTION PRN
OUTPATIENT
Start: 2025-03-05

## 2025-02-19 RX ORDER — SODIUM CHLORIDE 9 MG/ML
INJECTION, SOLUTION INTRAVENOUS CONTINUOUS
OUTPATIENT
Start: 2025-03-05

## 2025-02-19 RX ORDER — EPINEPHRINE 1 MG/ML
0.3 INJECTION, SOLUTION INTRAMUSCULAR; SUBCUTANEOUS PRN
OUTPATIENT
Start: 2025-03-05

## 2025-02-19 RX ORDER — ALBUTEROL SULFATE 90 UG/1
4 INHALANT RESPIRATORY (INHALATION) PRN
OUTPATIENT
Start: 2025-03-05

## 2025-02-19 RX ORDER — DIPHENHYDRAMINE HYDROCHLORIDE 50 MG/ML
50 INJECTION INTRAMUSCULAR; INTRAVENOUS
OUTPATIENT
Start: 2025-03-05

## 2025-02-19 RX ADMIN — IRON SUCROSE 300 MG: 20 INJECTION, SOLUTION INTRAVENOUS at 14:24

## 2025-02-19 RX ADMIN — SODIUM CHLORIDE 20 ML/HR: 0.9 INJECTION, SOLUTION INTRAVENOUS at 14:11

## 2025-02-19 NOTE — PROGRESS NOTES
Patient arrive ambulatory for 1 of 2 Venofer infusions.  Pt continues to be tired but denies other complaint or concern.  Vitals as charted.  Pt educated and [facility] sheet given.   Peripheral IV established per policy.  Venofer infused with no sign of adverse reaction; line flushed while monitoring patient post infusion.  IV catheter removed with pressure dressing applied.  Patient discharge.

## 2025-02-21 ENCOUNTER — TELEPHONE (OUTPATIENT)
Dept: ONCOLOGY | Age: 55
End: 2025-02-21

## 2025-02-21 NOTE — TELEPHONE ENCOUNTER
Name: Christiano Rider  : 1970  MRN: [id number]    Oncology Navigation Follow-Up Note    Contact Type:  Telephone    Notes: Writer called pt to see how he is feeling since having his iron infusion. He states he feels really good. Pt also asked if he would be candidate for genetic testing as he has a sister with hx of melanoma and another sister with history of breast cancer. Writer will route this note to Misa our genetics counselor and Laine her assist. Pt aware of plan and appreciative. Will continue to follow.      Electronically signed by Jael Mendez RN on 2025 at 2:51 PM

## 2025-03-05 ENCOUNTER — HOSPITAL ENCOUNTER (OUTPATIENT)
Dept: INFUSION THERAPY | Facility: MEDICAL CENTER | Age: 55
Discharge: HOME | End: 2025-03-05
Payer: COMMERCIAL

## 2025-03-05 VITALS
RESPIRATION RATE: 16 BRPM | SYSTOLIC BLOOD PRESSURE: 102 MMHG | DIASTOLIC BLOOD PRESSURE: 81 MMHG | TEMPERATURE: 97.9 F | HEART RATE: 85 BPM

## 2025-03-05 DIAGNOSIS — E61.1 IRON DEFICIENCY: Primary | ICD-10-CM

## 2025-03-05 PROCEDURE — 96365 THER/PROPH/DIAG IV INF INIT: CPT

## 2025-03-05 PROCEDURE — 2580000003 HC RX 258: Performed by: INTERNAL MEDICINE

## 2025-03-05 PROCEDURE — 6360000002 HC RX W HCPCS: Performed by: INTERNAL MEDICINE

## 2025-03-05 PROCEDURE — 96366 THER/PROPH/DIAG IV INF ADDON: CPT

## 2025-03-05 RX ORDER — SODIUM CHLORIDE 9 MG/ML
INJECTION, SOLUTION INTRAVENOUS CONTINUOUS
Status: CANCELLED | OUTPATIENT
Start: 2025-03-05

## 2025-03-05 RX ORDER — ALBUTEROL SULFATE 90 UG/1
4 INHALANT RESPIRATORY (INHALATION) PRN
Status: CANCELLED | OUTPATIENT
Start: 2025-03-05

## 2025-03-05 RX ORDER — HYDROCORTISONE SODIUM SUCCINATE 100 MG/2ML
100 INJECTION INTRAMUSCULAR; INTRAVENOUS
Status: CANCELLED | OUTPATIENT
Start: 2025-03-05

## 2025-03-05 RX ORDER — ONDANSETRON 2 MG/ML
8 INJECTION INTRAMUSCULAR; INTRAVENOUS
Status: CANCELLED | OUTPATIENT
Start: 2025-03-05

## 2025-03-05 RX ORDER — SODIUM CHLORIDE 9 MG/ML
5-250 INJECTION, SOLUTION INTRAVENOUS PRN
Status: DISCONTINUED | OUTPATIENT
Start: 2025-03-05 | End: 2025-03-06 | Stop reason: HOSPADM

## 2025-03-05 RX ORDER — SODIUM CHLORIDE 0.9 % (FLUSH) 0.9 %
5-40 SYRINGE (ML) INJECTION PRN
Status: CANCELLED | OUTPATIENT
Start: 2025-03-05

## 2025-03-05 RX ORDER — DIPHENHYDRAMINE HYDROCHLORIDE 50 MG/ML
50 INJECTION INTRAMUSCULAR; INTRAVENOUS
Status: CANCELLED | OUTPATIENT
Start: 2025-03-05

## 2025-03-05 RX ORDER — ACETAMINOPHEN 325 MG/1
650 TABLET ORAL
Status: CANCELLED | OUTPATIENT
Start: 2025-03-05

## 2025-03-05 RX ORDER — HEPARIN 100 UNIT/ML
500 SYRINGE INTRAVENOUS PRN
Status: CANCELLED | OUTPATIENT
Start: 2025-03-05

## 2025-03-05 RX ORDER — SODIUM CHLORIDE 9 MG/ML
5-250 INJECTION, SOLUTION INTRAVENOUS PRN
Status: CANCELLED | OUTPATIENT
Start: 2025-03-05

## 2025-03-05 RX ORDER — EPINEPHRINE 1 MG/ML
0.3 INJECTION, SOLUTION INTRAMUSCULAR; SUBCUTANEOUS PRN
Status: CANCELLED | OUTPATIENT
Start: 2025-03-05

## 2025-03-05 RX ORDER — FAMOTIDINE 10 MG/ML
20 INJECTION, SOLUTION INTRAVENOUS
Status: CANCELLED | OUTPATIENT
Start: 2025-03-05

## 2025-03-05 RX ADMIN — SODIUM CHLORIDE 50 ML/HR: 9 INJECTION, SOLUTION INTRAVENOUS at 09:07

## 2025-03-05 RX ADMIN — IRON SUCROSE 300 MG: 20 INJECTION, SOLUTION INTRAVENOUS at 09:19

## 2025-03-05 NOTE — PROGRESS NOTES
Patient arrived ambulatory for 2 of 2 Venofer infusion.  Denies any complaint or concern. Patient states he did well with the last infusion.  Vitals as charted.  Peripheral IV established per policy.  Venofer infused with no sign of adverse reaction; line flushed while monitoring patient.  Intact IV catheter removed with pressure dressing applied.  Patient discharged with knowledge of next appointment.

## 2025-03-07 ENCOUNTER — TELEPHONE (OUTPATIENT)
Age: 55
End: 2025-03-07

## 2025-03-07 NOTE — TELEPHONE ENCOUNTER
Patient contacted office to request that his RTW date be changed to 4/14. Patient stated that he will need a few more weeks. Please advise. I will send letter.

## 2025-03-10 ENCOUNTER — HOSPITAL ENCOUNTER (OUTPATIENT)
Dept: NUCLEAR MEDICINE | Age: 55
Discharge: HOME | End: 2025-03-12
Attending: INTERNAL MEDICINE
Payer: COMMERCIAL

## 2025-03-10 DIAGNOSIS — C49.A3 GIST (GASTROINTESTINAL STROMAL TUMOR) OF SMALL BOWEL, MALIGNANT (HCC): ICD-10-CM

## 2025-03-10 LAB — GLUCOSE BLD-MCNC: 108 MG/DL (ref 75–110)

## 2025-03-10 PROCEDURE — 3430000000 HC RX DIAGNOSTIC RADIOPHARMACEUTICAL: Performed by: INTERNAL MEDICINE

## 2025-03-10 PROCEDURE — 2500000003 HC RX 250 WO HCPCS: Performed by: INTERNAL MEDICINE

## 2025-03-10 PROCEDURE — 78815 PET IMAGE W/CT SKULL-THIGH: CPT

## 2025-03-10 PROCEDURE — 82947 ASSAY GLUCOSE BLOOD QUANT: CPT

## 2025-03-10 PROCEDURE — A9609 HC RX DIAGNOSTIC RADIOPHARMACEUTICAL: HCPCS | Performed by: INTERNAL MEDICINE

## 2025-03-10 RX ORDER — FLUDEOXYGLUCOSE F 18 200 MCI/ML
10 INJECTION, SOLUTION INTRAVENOUS
Status: COMPLETED | OUTPATIENT
Start: 2025-03-10 | End: 2025-03-10

## 2025-03-10 RX ORDER — SODIUM CHLORIDE 0.9 % (FLUSH) 0.9 %
10 SYRINGE (ML) INJECTION
Status: COMPLETED | OUTPATIENT
Start: 2025-03-10 | End: 2025-03-10

## 2025-03-10 RX ADMIN — FLUDEOXYGLUCOSE F 18 10.4 MILLICURIE: 200 INJECTION, SOLUTION INTRAVENOUS at 09:50

## 2025-03-10 RX ADMIN — SODIUM CHLORIDE, PRESERVATIVE FREE 10 ML: 5 INJECTION INTRAVENOUS at 09:50

## 2025-03-13 ENCOUNTER — TELEPHONE (OUTPATIENT)
Dept: ONCOLOGY | Age: 55
End: 2025-03-13

## 2025-03-13 ENCOUNTER — OFFICE VISIT (OUTPATIENT)
Dept: ONCOLOGY | Age: 55
End: 2025-03-13
Payer: COMMERCIAL

## 2025-03-13 VITALS
SYSTOLIC BLOOD PRESSURE: 98 MMHG | HEART RATE: 109 BPM | RESPIRATION RATE: 18 BRPM | TEMPERATURE: 96.3 F | BODY MASS INDEX: 21.06 KG/M2 | DIASTOLIC BLOOD PRESSURE: 70 MMHG | WEIGHT: 164 LBS

## 2025-03-13 DIAGNOSIS — K63.89 SMALL BOWEL MASS: ICD-10-CM

## 2025-03-13 DIAGNOSIS — E61.1 IRON DEFICIENCY: ICD-10-CM

## 2025-03-13 DIAGNOSIS — K90.9 IRON MALABSORPTION: ICD-10-CM

## 2025-03-13 DIAGNOSIS — C49.A3 GIST (GASTROINTESTINAL STROMAL TUMOR) OF SMALL BOWEL, MALIGNANT (HCC): Primary | ICD-10-CM

## 2025-03-13 DIAGNOSIS — K92.2 SMALL INTESTINAL HEMORRHAGE: ICD-10-CM

## 2025-03-13 DIAGNOSIS — D50.8 IRON DEFICIENCY ANEMIA SECONDARY TO INADEQUATE DIETARY IRON INTAKE: ICD-10-CM

## 2025-03-13 PROCEDURE — 99211 OFF/OP EST MAY X REQ PHY/QHP: CPT

## 2025-03-13 RX ORDER — SODIUM CHLORIDE 9 MG/ML
5-250 INJECTION, SOLUTION INTRAVENOUS PRN
OUTPATIENT
Start: 2025-03-13

## 2025-03-13 RX ORDER — EPINEPHRINE 1 MG/ML
0.3 INJECTION, SOLUTION, CONCENTRATE INTRAVENOUS PRN
OUTPATIENT
Start: 2025-03-13

## 2025-03-13 RX ORDER — ACETAMINOPHEN 325 MG/1
650 TABLET ORAL
OUTPATIENT
Start: 2025-03-13

## 2025-03-13 RX ORDER — SODIUM CHLORIDE 0.9 % (FLUSH) 0.9 %
5-40 SYRINGE (ML) INJECTION PRN
OUTPATIENT
Start: 2025-03-13

## 2025-03-13 RX ORDER — SODIUM CHLORIDE 9 MG/ML
INJECTION, SOLUTION INTRAVENOUS CONTINUOUS
OUTPATIENT
Start: 2025-03-13

## 2025-03-13 RX ORDER — HEPARIN SODIUM (PORCINE) LOCK FLUSH IV SOLN 100 UNIT/ML 100 UNIT/ML
500 SOLUTION INTRAVENOUS PRN
OUTPATIENT
Start: 2025-03-13

## 2025-03-13 RX ORDER — DIPHENHYDRAMINE HYDROCHLORIDE 50 MG/ML
50 INJECTION INTRAMUSCULAR; INTRAVENOUS
OUTPATIENT
Start: 2025-03-13

## 2025-03-13 RX ORDER — ONDANSETRON 2 MG/ML
8 INJECTION INTRAMUSCULAR; INTRAVENOUS
OUTPATIENT
Start: 2025-03-13

## 2025-03-13 RX ORDER — FAMOTIDINE 10 MG/ML
20 INJECTION, SOLUTION INTRAVENOUS
OUTPATIENT
Start: 2025-03-13

## 2025-03-13 RX ORDER — PANTOPRAZOLE SODIUM 40 MG/1
40 TABLET, DELAYED RELEASE ORAL DAILY
COMMUNITY

## 2025-03-13 RX ORDER — SUCRALFATE 1 G/1
1 TABLET ORAL 4 TIMES DAILY
Qty: 120 TABLET | Refills: 3 | Status: SHIPPED | OUTPATIENT
Start: 2025-03-13

## 2025-03-13 RX ORDER — ALBUTEROL SULFATE 90 UG/1
4 INHALANT RESPIRATORY (INHALATION) PRN
OUTPATIENT
Start: 2025-03-13

## 2025-03-13 RX ORDER — HYDROCORTISONE SODIUM SUCCINATE 100 MG/2ML
100 INJECTION INTRAMUSCULAR; INTRAVENOUS
OUTPATIENT
Start: 2025-03-13

## 2025-03-13 NOTE — PATIENT INSTRUCTIONS
Discontinue Venofer  Start Feraheme   Carafate prescription  RV about 5-6 weeks with CBC, Iron studies, diagnostic PSA before RV

## 2025-03-13 NOTE — TELEPHONE ENCOUNTER
ALPHONSE HERE FOR MD VISIT  Discontinue Venofer  Start Feraheme   Carafate prescription  RV about 5-6 weeks with CBC, Iron studies, diagnostic PSA before RV  NEW ORDER IS PENDING PRECERT  LAB ORDERS MAILED TO PT  MD VISIT 4/17/25 @ 3:45PM  AVS PRINTED W/ INSTRUCTIONS AND GIVEN TO PT ON EXIT

## 2025-03-13 NOTE — PROGRESS NOTES
Christiano Rider                                                                                                                  3/13/2025  MRN:   [id number]  YOB: 1970  PCP:                           Elan Shields MD  Referring Physician: No ref. provider found  Treating Physician Name: Leticia Yee MD      Reason for visit:  Chief Complaint   Patient presents with    Follow-up     Abd pain \"4\" nexium changed to protonix and also takes food = does not help pain  After first infusion - abd pain for 6 days, after this last infusion - abd pain for 8 days and increasing and now using oxycodone at night and then will sleep  Lightheaded after about 5 seconds after standing   Posthospital discharge follow-up    Current problems:  Gastrointestinal stromal tumor  Fatigue  Anemia  Iron deficiency  Persistent abdominal pain  GERD  Iron malabsorption  Status post resection of jejunum    Active and recent treatments:  CT PET for staging.  Symptomatic treatment for above-mentioned issues  Supportive care  No active treatment necessary for GIST    Summary of Case/History:  Christiano Rider a 54 y.o.male is a patient with medical history significant for GERD/eosinophilic esophagitis, asthma who is initially admitted to the [facility] for abdominal pain and blood in stool.  He was found to be anemic requiring blood transfusion.  GI was consulted and patient on the EGD and colonoscopy.  EGD showed 2 cm hiatal hernia with no active bleeding.  Colonoscopy did show melanotic stools.  So patient underwent repeat EGD which showed blood in the terminal part.  CTA was obtained which was concerning for small bowel hemorrhage and he was transferred to [facility] for IR guided embolization.  He received multiple transfusions in between due to acute blood loss anemia.  He did undergo IR guided visualization which showed a small bowel mass with possible AV shunting.  General surgery  Lab: [id number] Lab: [id number]

## 2025-03-13 NOTE — TELEPHONE ENCOUNTER
Name: Christiano Rider  : 1970  MRN: [id number]    Oncology Navigation Follow-Up Note    Contact Type:  Medical Oncology    Notes: Writer met with pt today face to face during  his f/u with Dr. Stein. Pt reports recovering slowly from iron infusions and is requesting labs to assess his progress. Pt to discuss today with Dr. Stein. Pt also reporting severe abd pain similar to the pain he had at the beginning of his dx.  Pt will discuss with Dr. Stein today. Will continue to follow.      Electronically signed by Jael Mendez RN on 3/13/2025 at 3:58 PM

## 2025-03-14 DIAGNOSIS — C49.A3 GIST (GASTROINTESTINAL STROMAL TUMOR) OF SMALL BOWEL, MALIGNANT (HCC): Primary | ICD-10-CM

## 2025-03-18 ENCOUNTER — TELEPHONE (OUTPATIENT)
Dept: ONCOLOGY | Age: 55
End: 2025-03-18

## 2025-03-18 NOTE — TELEPHONE ENCOUNTER
Name: Christiano Rider  : 1970  MRN: [id number]    Oncology Navigation Follow-Up Note    Contact Type:  Telephone    Notes: Writer called pt to inform him that [facility] is attempting to contact him to schedule his iron infusion and his VM is full. [facility] Pt will call today and was appreciative of information. Will continue to follow.      Electronically signed by Jael Mendez RN on 3/18/2025 at 12:51 PM

## 2025-03-18 NOTE — TELEPHONE ENCOUNTER
I CALLED TO [facility] FOR HIS IRON INFUSIONS AND  RECEIVED VM AND IT WAS FULL AND WAS NOT ABLE TO LEAVE A MESSAGE. I WILL TRY TO CALL AGAIN.

## 2025-03-20 ENCOUNTER — HOSPITAL ENCOUNTER (OUTPATIENT)
Facility: CLINIC | Age: 55
Discharge: HOME | End: 2025-03-20
Payer: COMMERCIAL

## 2025-03-20 DIAGNOSIS — C49.A3 GIST (GASTROINTESTINAL STROMAL TUMOR) OF SMALL BOWEL, MALIGNANT (HCC): ICD-10-CM

## 2025-03-20 LAB
BASOPHILS # BLD: 0.06 K/UL (ref 0–0.2)
BASOPHILS NFR BLD: 1 % (ref 0–2)
EOSINOPHIL # BLD: 0.23 K/UL (ref 0–0.44)
EOSINOPHILS RELATIVE PERCENT: 4 % (ref 1–4)
ERYTHROCYTE [DISTWIDTH] IN BLOOD BY AUTOMATED COUNT: 15.2 % (ref 11.8–14.4)
FERRITIN SERPL-MCNC: 87 NG/ML (ref 30–400)
HCT VFR BLD AUTO: 42.7 % (ref 40.7–50.3)
HGB BLD-MCNC: 13.1 G/DL (ref 13–17)
IMM GRANULOCYTES # BLD AUTO: <0.03 K/UL (ref 0–0.3)
IMM GRANULOCYTES NFR BLD: 0 %
IRON SATN MFR SERPL: 28 % (ref 20–55)
IRON SERPL-MCNC: 92 UG/DL (ref 61–157)
LYMPHOCYTES NFR BLD: 1.63 K/UL (ref 1.1–3.7)
LYMPHOCYTES RELATIVE PERCENT: 29 % (ref 24–43)
MCH RBC QN AUTO: 26.6 PG (ref 25.2–33.5)
MCHC RBC AUTO-ENTMCNC: 30.7 G/DL (ref 28.4–34.8)
MCV RBC AUTO: 86.8 FL (ref 82.6–102.9)
MONOCYTES NFR BLD: 0.29 K/UL (ref 0.1–1.2)
MONOCYTES NFR BLD: 5 % (ref 3–12)
NEUTROPHILS NFR BLD: 61 % (ref 36–65)
NEUTS SEG NFR BLD: 3.5 K/UL (ref 1.5–8.1)
NRBC BLD-RTO: 0 PER 100 WBC
PLATELET # BLD AUTO: 210 K/UL (ref 138–453)
PMV BLD AUTO: 10.5 FL (ref 8.1–13.5)
PSA SERPL-MCNC: 1.54 NG/ML (ref 0–4)
RBC # BLD AUTO: 4.92 M/UL (ref 4.21–5.77)
RBC # BLD: ABNORMAL 10*6/UL
TIBC SERPL-MCNC: 332 UG/DL (ref 250–450)
UNSATURATED IRON BINDING CAPACITY: 240 UG/DL (ref 112–347)
WBC OTHER # BLD: 5.7 K/UL (ref 3.5–11.3)

## 2025-03-20 PROCEDURE — 83550 IRON BINDING TEST: CPT

## 2025-03-20 PROCEDURE — 36415 COLL VENOUS BLD VENIPUNCTURE: CPT

## 2025-03-20 PROCEDURE — 84153 ASSAY OF PSA TOTAL: CPT

## 2025-03-20 PROCEDURE — 85025 COMPLETE CBC W/AUTO DIFF WBC: CPT

## 2025-03-20 PROCEDURE — 83540 ASSAY OF IRON: CPT

## 2025-03-20 PROCEDURE — 82728 ASSAY OF FERRITIN: CPT

## 2025-03-21 ENCOUNTER — HOSPITAL ENCOUNTER (OUTPATIENT)
Dept: INFUSION THERAPY | Facility: MEDICAL CENTER | Age: 55
Discharge: HOME | End: 2025-03-21
Payer: COMMERCIAL

## 2025-03-21 VITALS
RESPIRATION RATE: 18 BRPM | TEMPERATURE: 97.7 F | SYSTOLIC BLOOD PRESSURE: 103 MMHG | HEART RATE: 82 BPM | DIASTOLIC BLOOD PRESSURE: 76 MMHG

## 2025-03-21 DIAGNOSIS — D50.8 IRON DEFICIENCY ANEMIA SECONDARY TO INADEQUATE DIETARY IRON INTAKE: Primary | ICD-10-CM

## 2025-03-21 DIAGNOSIS — K90.9 IRON MALABSORPTION: ICD-10-CM

## 2025-03-21 DIAGNOSIS — E61.1 IRON DEFICIENCY: ICD-10-CM

## 2025-03-21 PROCEDURE — 6360000002 HC RX W HCPCS: Performed by: INTERNAL MEDICINE

## 2025-03-21 PROCEDURE — 2580000003 HC RX 258: Performed by: INTERNAL MEDICINE

## 2025-03-21 PROCEDURE — 96365 THER/PROPH/DIAG IV INF INIT: CPT

## 2025-03-21 RX ORDER — SODIUM CHLORIDE 9 MG/ML
5-250 INJECTION, SOLUTION INTRAVENOUS PRN
Status: DISCONTINUED | OUTPATIENT
Start: 2025-03-21 | End: 2025-03-22 | Stop reason: HOSPADM

## 2025-03-21 RX ORDER — SODIUM CHLORIDE 0.9 % (FLUSH) 0.9 %
5-40 SYRINGE (ML) INJECTION PRN
Status: CANCELLED | OUTPATIENT
Start: 2025-03-28

## 2025-03-21 RX ORDER — ONDANSETRON 2 MG/ML
8 INJECTION INTRAMUSCULAR; INTRAVENOUS
Status: CANCELLED | OUTPATIENT
Start: 2025-03-28

## 2025-03-21 RX ORDER — DIPHENHYDRAMINE HYDROCHLORIDE 50 MG/ML
50 INJECTION, SOLUTION INTRAMUSCULAR; INTRAVENOUS
Status: CANCELLED | OUTPATIENT
Start: 2025-03-28

## 2025-03-21 RX ORDER — SODIUM CHLORIDE 9 MG/ML
5-250 INJECTION, SOLUTION INTRAVENOUS PRN
Status: CANCELLED | OUTPATIENT
Start: 2025-03-28

## 2025-03-21 RX ORDER — ACETAMINOPHEN 325 MG/1
650 TABLET ORAL
Status: CANCELLED | OUTPATIENT
Start: 2025-03-28

## 2025-03-21 RX ORDER — ALBUTEROL SULFATE 90 UG/1
4 INHALANT RESPIRATORY (INHALATION) PRN
Status: CANCELLED | OUTPATIENT
Start: 2025-03-28

## 2025-03-21 RX ORDER — HYDROCORTISONE SODIUM SUCCINATE 100 MG/2ML
100 INJECTION INTRAMUSCULAR; INTRAVENOUS
Status: CANCELLED | OUTPATIENT
Start: 2025-03-28

## 2025-03-21 RX ORDER — FAMOTIDINE 10 MG/ML
20 INJECTION, SOLUTION INTRAVENOUS
Status: CANCELLED | OUTPATIENT
Start: 2025-03-28

## 2025-03-21 RX ORDER — SODIUM CHLORIDE 9 MG/ML
INJECTION, SOLUTION INTRAVENOUS CONTINUOUS
Status: CANCELLED | OUTPATIENT
Start: 2025-03-28

## 2025-03-21 RX ORDER — SODIUM CHLORIDE 0.9 % (FLUSH) 0.9 %
5-40 SYRINGE (ML) INJECTION PRN
Status: DISCONTINUED | OUTPATIENT
Start: 2025-03-21 | End: 2025-03-22 | Stop reason: HOSPADM

## 2025-03-21 RX ORDER — EPINEPHRINE 1 MG/ML
0.3 INJECTION, SOLUTION INTRAMUSCULAR; SUBCUTANEOUS PRN
Status: CANCELLED | OUTPATIENT
Start: 2025-03-28

## 2025-03-21 RX ORDER — HEPARIN 100 UNIT/ML
500 SYRINGE INTRAVENOUS PRN
Status: CANCELLED | OUTPATIENT
Start: 2025-03-28

## 2025-03-21 RX ADMIN — FERUMOXYTOL 510 MG: 510 INJECTION INTRAVENOUS at 13:57

## 2025-03-21 RX ADMIN — SODIUM CHLORIDE 150 ML/HR: 0.9 INJECTION, SOLUTION INTRAVENOUS at 13:56

## 2025-03-21 NOTE — PROGRESS NOTES
Pt arrives per amb per self and orders reviewed and NS flushing line before and after feraheme and no reactions or complaints and blood return present throughout infusion and pt discharged per amb per self and pt states has next appt.

## 2025-03-27 ENCOUNTER — TELEPHONE (OUTPATIENT)
Dept: ONCOLOGY | Age: 55
End: 2025-03-27

## 2025-03-27 NOTE — TELEPHONE ENCOUNTER
Name: Christiano Rider  : 1970  MRN: [id number]    Oncology Navigation Follow-Up Note    Contact Type:  Telephone    Notes: Writer called pt to check on him and to see how he's feeling since having his iron infusion. Pt states he does feel much better. He states he's definitely not back to normal but does feel better. Will continue to follow.      Electronically signed by Jael Mendez RN on 3/27/2025 at 3:26 PM

## 2025-03-28 ENCOUNTER — HOSPITAL ENCOUNTER (OUTPATIENT)
Dept: INFUSION THERAPY | Facility: MEDICAL CENTER | Age: 55
Discharge: HOME | End: 2025-03-28
Payer: COMMERCIAL

## 2025-03-28 VITALS
TEMPERATURE: 98.1 F | RESPIRATION RATE: 18 BRPM | SYSTOLIC BLOOD PRESSURE: 102 MMHG | DIASTOLIC BLOOD PRESSURE: 75 MMHG | HEART RATE: 79 BPM

## 2025-03-28 DIAGNOSIS — K90.9 IRON MALABSORPTION: ICD-10-CM

## 2025-03-28 DIAGNOSIS — D50.8 IRON DEFICIENCY ANEMIA SECONDARY TO INADEQUATE DIETARY IRON INTAKE: Primary | ICD-10-CM

## 2025-03-28 DIAGNOSIS — E61.1 IRON DEFICIENCY: ICD-10-CM

## 2025-03-28 PROCEDURE — 6360000002 HC RX W HCPCS: Performed by: INTERNAL MEDICINE

## 2025-03-28 PROCEDURE — 96365 THER/PROPH/DIAG IV INF INIT: CPT

## 2025-03-28 PROCEDURE — 2580000003 HC RX 258: Performed by: INTERNAL MEDICINE

## 2025-03-28 RX ORDER — DIPHENHYDRAMINE HYDROCHLORIDE 50 MG/ML
50 INJECTION, SOLUTION INTRAMUSCULAR; INTRAVENOUS
OUTPATIENT
Start: 2025-03-28

## 2025-03-28 RX ORDER — ACETAMINOPHEN 325 MG/1
650 TABLET ORAL
OUTPATIENT
Start: 2025-03-28

## 2025-03-28 RX ORDER — HYDROCORTISONE SODIUM SUCCINATE 100 MG/2ML
100 INJECTION INTRAMUSCULAR; INTRAVENOUS
OUTPATIENT
Start: 2025-03-28

## 2025-03-28 RX ORDER — HEPARIN 100 UNIT/ML
500 SYRINGE INTRAVENOUS PRN
OUTPATIENT
Start: 2025-03-28

## 2025-03-28 RX ORDER — EPINEPHRINE 1 MG/ML
0.3 INJECTION, SOLUTION INTRAMUSCULAR; SUBCUTANEOUS PRN
OUTPATIENT
Start: 2025-03-28

## 2025-03-28 RX ORDER — SODIUM CHLORIDE 9 MG/ML
5-250 INJECTION, SOLUTION INTRAVENOUS PRN
Status: CANCELLED | OUTPATIENT
Start: 2025-03-28

## 2025-03-28 RX ORDER — FAMOTIDINE 10 MG/ML
20 INJECTION, SOLUTION INTRAVENOUS
OUTPATIENT
Start: 2025-03-28

## 2025-03-28 RX ORDER — SODIUM CHLORIDE 9 MG/ML
INJECTION, SOLUTION INTRAVENOUS CONTINUOUS
OUTPATIENT
Start: 2025-03-28

## 2025-03-28 RX ORDER — ALBUTEROL SULFATE 90 UG/1
4 INHALANT RESPIRATORY (INHALATION) PRN
OUTPATIENT
Start: 2025-03-28

## 2025-03-28 RX ORDER — SODIUM CHLORIDE 0.9 % (FLUSH) 0.9 %
5-40 SYRINGE (ML) INJECTION PRN
OUTPATIENT
Start: 2025-03-28

## 2025-03-28 RX ORDER — SODIUM CHLORIDE 9 MG/ML
5-250 INJECTION, SOLUTION INTRAVENOUS PRN
OUTPATIENT
Start: 2025-03-28

## 2025-03-28 RX ORDER — ONDANSETRON 2 MG/ML
8 INJECTION INTRAMUSCULAR; INTRAVENOUS
OUTPATIENT
Start: 2025-03-28

## 2025-03-28 RX ORDER — SODIUM CHLORIDE 9 MG/ML
5-250 INJECTION, SOLUTION INTRAVENOUS PRN
Status: DISCONTINUED | OUTPATIENT
Start: 2025-03-28 | End: 2025-03-29 | Stop reason: HOSPADM

## 2025-03-28 RX ADMIN — FERUMOXYTOL 510 MG: 510 INJECTION INTRAVENOUS at 14:38

## 2025-03-28 RX ADMIN — SODIUM CHLORIDE 100 ML/HR: 9 INJECTION, SOLUTION INTRAVENOUS at 14:27

## 2025-03-28 ASSESSMENT — PAIN DESCRIPTION - LOCATION: LOCATION: ABDOMEN

## 2025-03-28 NOTE — PROGRESS NOTES
Pt arrives per amb per self and orders reviewed and pt tolerated feraheme well and NS flushing line before and after and no reactions or complaints and blood return present throughout infusion and pt discharged per amb per self.

## 2025-04-08 ENCOUNTER — TELEPHONE (OUTPATIENT)
Dept: ONCOLOGY | Age: 55
End: 2025-04-08

## 2025-04-08 ENCOUNTER — TELEPHONE (OUTPATIENT)
Dept: SURGERY | Age: 55
End: 2025-04-08

## 2025-04-08 NOTE — TELEPHONE ENCOUNTER
Name: Christiano Rider  : 1970  MRN: [id number]    Oncology Navigation Follow-Up Note    Contact Type:  Telephone    Notes: Writer received a call from pt stating his return to work date is supposed to be this coming Monday. Pt states although he does feel a little better he's still not feeling capable of going back to work FT. Writer informed him that he would have to contact the surgeons office for an extension as they did the original FMLA. Pt appreciative of information and will call that office. Will continue to follow.      Electronically signed by Jael Mendez RN on 2025 at 1:12 PM

## 2025-04-08 NOTE — TELEPHONE ENCOUNTER
Patient contacted office  to request that his Leave of absence be extended for another month. Patient return to work date is 04/14. Patient stated that he is currently taking iron infusions, and still feel as though he can't go back to work with low iron levels. Please advise if patient will need to be reevaluated. Patient is requesting another month.

## 2025-04-14 ENCOUNTER — TELEPHONE (OUTPATIENT)
Dept: INFUSION THERAPY | Age: 55
End: 2025-04-14

## 2025-04-17 ENCOUNTER — TELEPHONE (OUTPATIENT)
Dept: ONCOLOGY | Age: 55
End: 2025-04-17

## 2025-04-17 ENCOUNTER — OFFICE VISIT (OUTPATIENT)
Dept: ONCOLOGY | Age: 55
End: 2025-04-17
Payer: COMMERCIAL

## 2025-04-17 VITALS
SYSTOLIC BLOOD PRESSURE: 113 MMHG | HEIGHT: 74 IN | BODY MASS INDEX: 21.15 KG/M2 | HEART RATE: 85 BPM | TEMPERATURE: 98.1 F | WEIGHT: 164.8 LBS | RESPIRATION RATE: 16 BRPM | DIASTOLIC BLOOD PRESSURE: 80 MMHG

## 2025-04-17 DIAGNOSIS — C49.A3 GIST (GASTROINTESTINAL STROMAL TUMOR) OF SMALL BOWEL, MALIGNANT (HCC): Primary | ICD-10-CM

## 2025-04-17 DIAGNOSIS — K92.2 SMALL INTESTINAL HEMORRHAGE: ICD-10-CM

## 2025-04-17 DIAGNOSIS — E61.1 IRON DEFICIENCY: ICD-10-CM

## 2025-04-17 PROCEDURE — 99214 OFFICE O/P EST MOD 30 MIN: CPT | Performed by: INTERNAL MEDICINE

## 2025-04-17 PROCEDURE — 99211 OFF/OP EST MAY X REQ PHY/QHP: CPT

## 2025-04-17 NOTE — PROGRESS NOTES
Christiano Rider                                                                                                                  4/17/2025  MRN:   [id number]  YOB: 1970  PCP:                           Elan Shields MD  Referring Physician: No ref. provider found  Treating Physician Name: Leticia Yee MD      Reason for visit:  Chief Complaint   Patient presents with    Follow-up   Posthospital discharge follow-up    Current problems:  Gastrointestinal stromal tumor  Fatigue  Anemia  Iron deficiency  Persistent abdominal pain  GERD  Iron malabsorption  Status post resection of jejunum    Active and recent treatments:  CT PET for staging.  Symptomatic treatment for above-mentioned issues  Supportive care  No active treatment necessary for GIST    Summary of Case/History:  Christiano Rider a 54 y.o.male is a patient with medical history significant for GERD/eosinophilic esophagitis, asthma who is initially admitted to the [facility] for abdominal pain and blood in stool.  He was found to be anemic requiring blood transfusion.  GI was consulted and patient on the EGD and colonoscopy.  EGD showed 2 cm hiatal hernia with no active bleeding.  Colonoscopy did show melanotic stools.  So patient underwent repeat EGD which showed blood in the terminal part.  CTA was obtained which was concerning for small bowel hemorrhage and he was transferred to [facility] for IR guided embolization.  He received multiple transfusions in between due to acute blood loss anemia.  He did undergo IR guided visualization which showed a small bowel mass with possible AV shunting.  General surgery was consulted and patient underwent diagnostic exploratory laparotomy with resection of a 3x3 cm polypoidal extraluminal mass.  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  Mitotic rate 1 per 5 mm².

## 2025-04-17 NOTE — TELEPHONE ENCOUNTER
ALPHONSE HERE FOR MD VISIT  RV about 2-3 months with CBC, CMP, Iron studies, vitamin B12/ folate, before RV  LAB ORDERS MAILED TO PT  MD VISIT 6/17/25 @ 4PM  AVS PRINTED W/ INSTRUCTIONS AND GIVEN TO PT ON EXIT

## 2025-04-23 ENCOUNTER — TELEPHONE (OUTPATIENT)
Age: 55
End: 2025-04-23
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
## 2025-04-24 ENCOUNTER — TELEPHONE (OUTPATIENT)
Dept: INFUSION THERAPY | Age: 55
End: 2025-04-24

## 2025-04-24 NOTE — TELEPHONE ENCOUNTER
Pt called to get his FMLA moved back to 05.26.25. [facility] paper work updated and faxed to [phone] with confirmation

## 2025-05-01 ENCOUNTER — OFFICE VISIT (OUTPATIENT)
Age: 55
End: 2025-05-01
Payer: COMMERCIAL

## 2025-05-01 VITALS
HEIGHT: 74 IN | SYSTOLIC BLOOD PRESSURE: 120 MMHG | DIASTOLIC BLOOD PRESSURE: 83 MMHG | WEIGHT: 164 LBS | HEART RATE: 86 BPM | BODY MASS INDEX: 21.05 KG/M2

## 2025-05-01 DIAGNOSIS — D64.9 ANEMIA, UNSPECIFIED TYPE: Primary | ICD-10-CM

## 2025-05-01 DIAGNOSIS — R10.33 PERIUMBILICAL ABDOMINAL PAIN: ICD-10-CM

## 2025-05-01 DIAGNOSIS — C49.A3 GIST (GASTROINTESTINAL STROMAL TUMOR) OF SMALL BOWEL, MALIGNANT (HCC): Primary | ICD-10-CM

## 2025-05-01 PROCEDURE — 99212 OFFICE O/P EST SF 10 MIN: CPT | Performed by: NURSE PRACTITIONER
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
## 2025-05-07 ENCOUNTER — HOSPITAL ENCOUNTER (OUTPATIENT)
Dept: CT IMAGING | Facility: CLINIC | Age: 55
Discharge: HOME | End: 2025-05-09
Payer: COMMERCIAL

## 2025-05-07 DIAGNOSIS — R10.33 PERIUMBILICAL ABDOMINAL PAIN: ICD-10-CM

## 2025-05-07 DIAGNOSIS — C49.A3 GIST (GASTROINTESTINAL STROMAL TUMOR) OF SMALL BOWEL, MALIGNANT (HCC): ICD-10-CM

## 2025-05-07 PROCEDURE — 74176 CT ABD & PELVIS W/O CONTRAST: CPT

## 2025-05-21 ENCOUNTER — TELEPHONE (OUTPATIENT)
Age: 55
End: 2025-05-21

## 2025-05-21 NOTE — TELEPHONE ENCOUNTER
Name: Christiano Rider  : 1970  MRN: [id number]    Oncology Navigation Follow-Up Note    Contact Type:  Telephone    Notes: Covering navigator received call and pt. Requesting return work letter, anticipate going back . Per Pt. Not urgent, but once completed if it could be emailed to pt. Pt. Thanking everyone that has helped him.   Triage please assist with return to work letter.      Electronically signed by Inez Husain RN on 2025 at 11:24 AM

## 2025-06-13 ENCOUNTER — HOSPITAL ENCOUNTER (OUTPATIENT)
Facility: CLINIC | Age: 55
Discharge: HOME | End: 2025-06-13
Payer: COMMERCIAL

## 2025-06-13 DIAGNOSIS — D64.9 ANEMIA, UNSPECIFIED TYPE: ICD-10-CM

## 2025-06-13 LAB
ALBUMIN SERPL-MCNC: 4 G/DL (ref 3.5–5.2)
ALBUMIN/GLOB SERPL: 1.7 {RATIO} (ref 1–2.5)
ALP SERPL-CCNC: 69 U/L (ref 40–129)
ALT SERPL-CCNC: 34 U/L (ref 10–50)
ANION GAP SERPL CALCULATED.3IONS-SCNC: 12 MMOL/L (ref 9–16)
AST SERPL-CCNC: 25 U/L (ref 10–50)
BASOPHILS # BLD: 0.06 K/UL (ref 0–0.2)
BASOPHILS NFR BLD: 1 % (ref 0–2)
BILIRUB SERPL-MCNC: 0.3 MG/DL (ref 0–1.2)
BUN SERPL-MCNC: 13 MG/DL (ref 6–20)
CALCIUM SERPL-MCNC: 9.3 MG/DL (ref 8.6–10.4)
CHLORIDE SERPL-SCNC: 106 MMOL/L (ref 98–107)
CO2 SERPL-SCNC: 26 MMOL/L (ref 20–31)
CREAT SERPL-MCNC: 1.1 MG/DL (ref 0.7–1.2)
EOSINOPHIL # BLD: 0.47 K/UL (ref 0–0.44)
EOSINOPHILS RELATIVE PERCENT: 6 % (ref 1–4)
ERYTHROCYTE [DISTWIDTH] IN BLOOD BY AUTOMATED COUNT: 14.6 % (ref 11.8–14.4)
FERRITIN SERPL-MCNC: 100 NG/ML (ref 30–400)
GFR, ESTIMATED: 80 ML/MIN/1.73M2
GLUCOSE SERPL-MCNC: 101 MG/DL (ref 74–99)
HCT VFR BLD AUTO: 41.3 % (ref 40.7–50.3)
HGB BLD-MCNC: 14.1 G/DL (ref 13–17)
IMM GRANULOCYTES # BLD AUTO: <0.03 K/UL (ref 0–0.3)
IMM GRANULOCYTES NFR BLD: 0 %
IRON SATN MFR SERPL: 32 % (ref 20–55)
IRON SERPL-MCNC: 80 UG/DL (ref 61–157)
LYMPHOCYTES NFR BLD: 1.87 K/UL (ref 1.1–3.7)
LYMPHOCYTES RELATIVE PERCENT: 22 % (ref 24–43)
MCH RBC QN AUTO: 29.1 PG (ref 25.2–33.5)
MCHC RBC AUTO-ENTMCNC: 34.1 G/DL (ref 28.4–34.8)
MCV RBC AUTO: 85.3 FL (ref 82.6–102.9)
MONOCYTES NFR BLD: 0.53 K/UL (ref 0.1–1.2)
MONOCYTES NFR BLD: 6 % (ref 3–12)
NEUTROPHILS NFR BLD: 65 % (ref 36–65)
NEUTS SEG NFR BLD: 5.38 K/UL (ref 1.5–8.1)
NRBC BLD-RTO: 0 PER 100 WBC
PLATELET # BLD AUTO: 221 K/UL (ref 138–453)
PMV BLD AUTO: 10 FL (ref 8.1–13.5)
POTASSIUM SERPL-SCNC: 4.5 MMOL/L (ref 3.7–5.3)
PROT SERPL-MCNC: 6.3 G/DL (ref 6.6–8.7)
RBC # BLD AUTO: 4.84 M/UL (ref 4.21–5.77)
RBC # BLD: ABNORMAL 10*6/UL
SODIUM SERPL-SCNC: 144 MMOL/L (ref 136–145)
TIBC SERPL-MCNC: 252 UG/DL (ref 250–450)
UNSATURATED IRON BINDING CAPACITY: 172 UG/DL (ref 112–347)
WBC OTHER # BLD: 8.3 K/UL (ref 3.5–11.3)

## 2025-06-13 PROCEDURE — 82746 ASSAY OF FOLIC ACID SERUM: CPT

## 2025-06-13 PROCEDURE — 80053 COMPREHEN METABOLIC PANEL: CPT

## 2025-06-13 PROCEDURE — 36415 COLL VENOUS BLD VENIPUNCTURE: CPT

## 2025-06-13 PROCEDURE — 83550 IRON BINDING TEST: CPT

## 2025-06-13 PROCEDURE — 82728 ASSAY OF FERRITIN: CPT

## 2025-06-13 PROCEDURE — 85025 COMPLETE CBC W/AUTO DIFF WBC: CPT

## 2025-06-13 PROCEDURE — 82607 VITAMIN B-12: CPT

## 2025-06-13 PROCEDURE — 83540 ASSAY OF IRON: CPT

## 2025-06-14 LAB
FOLATE SERPL-MCNC: 13.1 NG/ML (ref 4.8–24.2)
VIT B12 SERPL-MCNC: 395 PG/ML (ref 232–1245)

## 2025-06-17 ENCOUNTER — HOSPITAL ENCOUNTER (OUTPATIENT)
Facility: MEDICAL CENTER | Age: 55
End: 2025-06-17

## 2025-06-17 ENCOUNTER — TELEPHONE (OUTPATIENT)
Age: 55
End: 2025-06-17

## 2025-06-17 ENCOUNTER — OFFICE VISIT (OUTPATIENT)
Age: 55
End: 2025-06-17
Payer: COMMERCIAL

## 2025-06-17 ENCOUNTER — HOSPITAL ENCOUNTER (OUTPATIENT)
Facility: CLINIC | Age: 55
Discharge: HOME | End: 2025-06-17
Payer: COMMERCIAL

## 2025-06-17 VITALS
HEART RATE: 74 BPM | BODY MASS INDEX: 22.03 KG/M2 | SYSTOLIC BLOOD PRESSURE: 116 MMHG | RESPIRATION RATE: 16 BRPM | TEMPERATURE: 98.6 F | DIASTOLIC BLOOD PRESSURE: 91 MMHG | WEIGHT: 171.6 LBS

## 2025-06-17 DIAGNOSIS — R53.82 CHRONIC FATIGUE: ICD-10-CM

## 2025-06-17 DIAGNOSIS — K92.2 SMALL INTESTINAL HEMORRHAGE: ICD-10-CM

## 2025-06-17 DIAGNOSIS — C49.A3 GIST (GASTROINTESTINAL STROMAL TUMOR) OF SMALL BOWEL, MALIGNANT (HCC): Primary | ICD-10-CM

## 2025-06-17 LAB
25(OH)D3 SERPL-MCNC: 52.1 NG/ML (ref 30–100)
TSH SERPL DL<=0.05 MIU/L-ACNC: 3.06 UIU/ML (ref 0.27–4.2)

## 2025-06-17 PROCEDURE — 36415 COLL VENOUS BLD VENIPUNCTURE: CPT

## 2025-06-17 PROCEDURE — 84443 ASSAY THYROID STIM HORMONE: CPT

## 2025-06-17 PROCEDURE — 82306 VITAMIN D 25 HYDROXY: CPT

## 2025-06-17 PROCEDURE — 99214 OFFICE O/P EST MOD 30 MIN: CPT | Performed by: INTERNAL MEDICINE

## 2025-06-17 RX ORDER — PANTOPRAZOLE SODIUM 40 MG/1
40 TABLET, DELAYED RELEASE ORAL 2 TIMES DAILY
COMMUNITY
Start: 2025-02-27

## 2025-06-17 NOTE — PATIENT INSTRUCTIONS
TSH and vitamin D level soon  RV about 2-3 months with CBC, CMP, Iron studies, vitamin B12/ folate, before RV

## 2025-06-17 NOTE — TELEPHONE ENCOUNTER
ALPHONSE HERE FOR FOLLOW UP   TSH and vitamin D level soon  RV about 2-3 months with CBC, CMP, Iron studies, vitamin B12/ folate, before RV  MD VISIT: 8/26/25 @ 4PM   LABS PRINTED AND GIVEN ON EXIT( PT GETTING DONE IN NEXT COUPLE DAYS)   OTHER LABS WILL BE MAILED TO PT   AVS PRINTED AND GIVEN ON EXIT

## 2025-06-17 NOTE — PROGRESS NOTES
Christiano Rider                                                                                                                  6/17/2025  MRN:   [id number]  YOB: 1970  PCP:                           Elan Shields MD  Referring Physician: No ref. provider found  Treating Physician Name: Leticia Yee MD      Reason for visit:  Chief Complaint   Patient presents with    Follow-up    Fatigue     Getting weaker each week     Other   Posthospital discharge follow-up    Current problems:  Gastrointestinal stromal tumor  Fatigue  Anemia  Iron deficiency  Persistent abdominal pain  GERD  Iron malabsorption  Status post resection of jejunum    Active and recent treatments:  CT PET for staging.  Symptomatic treatment for above-mentioned issues  Supportive care  No active treatment necessary for GIST    Summary of Case/History:  Christiano Rider a 54 y.o.male is a patient with medical history significant for GERD/eosinophilic esophagitis, asthma who is initially admitted to the [facility] for abdominal pain and blood in stool.  He was found to be anemic requiring blood transfusion.  GI was consulted and patient on the EGD and colonoscopy.  EGD showed 2 cm hiatal hernia with no active bleeding.  Colonoscopy did show melanotic stools.  So patient underwent repeat EGD which showed blood in the terminal part.  CTA was obtained which was concerning for small bowel hemorrhage and he was transferred to [facility] for IR guided embolization.  He received multiple transfusions in between due to acute blood loss anemia.  He did undergo IR guided visualization which showed a small bowel mass with possible AV shunting.  General surgery was consulted and patient underwent diagnostic exploratory laparotomy with resection of a 3x3 cm polypoidal extraluminal mass.  Pathology of the mass came back today as GIST, low-grade with negative margins in the lymph nodes resected were also

## 2025-06-23 DIAGNOSIS — K90.9 IRON MALABSORPTION: Primary | ICD-10-CM

## 2025-07-25 ENCOUNTER — TELEPHONE (OUTPATIENT)
Age: 55
End: 2025-07-25

## 2025-07-25 NOTE — TELEPHONE ENCOUNTER
Name: Christiano Rider  : 1970  MRN: [id number]    Oncology Navigation Follow-Up Note    Contact Type:  Telephone    Notes: Writer received call from pt who reports he has new concerns. He voiced that he has been having increased ABD pain, especially at night. He also reports difficulty getting out of bed due to fatigue. Pt stated these symptosm are very similar to the ones he had prior to his cancer diagnosis so he is very worried and wants to meet with his Med/Onc. Pt has MO f/u end of August but will ask  [facility] schedulers to see if he can come in sooner. He encouraged to call back if he does not hear from office for sooner appt.       Electronically signed by Lucila Newsome RN on 2025 at 12:26 PM

## 2025-07-29 ENCOUNTER — HOSPITAL ENCOUNTER (OUTPATIENT)
Facility: MEDICAL CENTER | Age: 55
End: 2025-07-29

## 2025-07-29 ENCOUNTER — HOSPITAL ENCOUNTER (OUTPATIENT)
Facility: CLINIC | Age: 55
Discharge: HOME | End: 2025-07-29
Payer: COMMERCIAL

## 2025-07-29 DIAGNOSIS — K90.9 IRON MALABSORPTION: ICD-10-CM

## 2025-07-29 LAB
ALBUMIN SERPL-MCNC: 4.2 G/DL (ref 3.5–5.2)
ALBUMIN/GLOB SERPL: 1.6 {RATIO} (ref 1–2.5)
ALP SERPL-CCNC: 56 U/L (ref 40–129)
ALT SERPL-CCNC: 19 U/L (ref 10–50)
ANION GAP SERPL CALCULATED.3IONS-SCNC: 10 MMOL/L (ref 9–16)
AST SERPL-CCNC: 20 U/L (ref 10–50)
BASOPHILS # BLD: 0.05 K/UL (ref 0–0.2)
BASOPHILS NFR BLD: 1 % (ref 0–2)
BILIRUB SERPL-MCNC: 0.5 MG/DL (ref 0–1.2)
BUN SERPL-MCNC: 25 MG/DL (ref 6–20)
CALCIUM SERPL-MCNC: 9.4 MG/DL (ref 8.6–10.4)
CHLORIDE SERPL-SCNC: 102 MMOL/L (ref 98–107)
CO2 SERPL-SCNC: 26 MMOL/L (ref 20–31)
CREAT SERPL-MCNC: 1 MG/DL (ref 0.7–1.2)
EOSINOPHIL # BLD: 0.27 K/UL (ref 0–0.44)
EOSINOPHILS RELATIVE PERCENT: 3 % (ref 1–4)
ERYTHROCYTE [DISTWIDTH] IN BLOOD BY AUTOMATED COUNT: 13.3 % (ref 11.8–14.4)
FERRITIN SERPL-MCNC: 110 NG/ML (ref 30–400)
FOLATE SERPL-MCNC: 16.5 NG/ML (ref 4.8–24.2)
GFR, ESTIMATED: 89 ML/MIN/1.73M2
GLUCOSE SERPL-MCNC: 96 MG/DL (ref 74–99)
HCT VFR BLD AUTO: 40.6 % (ref 40.7–50.3)
HGB BLD-MCNC: 13.8 G/DL (ref 13–17)
IMM GRANULOCYTES # BLD AUTO: <0.03 K/UL (ref 0–0.3)
IMM GRANULOCYTES NFR BLD: 0 %
IRON SATN MFR SERPL: 27 % (ref 20–55)
IRON SERPL-MCNC: 72 UG/DL (ref 61–157)
LYMPHOCYTES NFR BLD: 1.94 K/UL (ref 1.1–3.7)
LYMPHOCYTES RELATIVE PERCENT: 23 % (ref 24–43)
MCH RBC QN AUTO: 30.1 PG (ref 25.2–33.5)
MCHC RBC AUTO-ENTMCNC: 34 G/DL (ref 28.4–34.8)
MCV RBC AUTO: 88.6 FL (ref 82.6–102.9)
MONOCYTES NFR BLD: 0.47 K/UL (ref 0.1–1.2)
MONOCYTES NFR BLD: 6 % (ref 3–12)
NEUTROPHILS NFR BLD: 67 % (ref 36–65)
NEUTS SEG NFR BLD: 5.76 K/UL (ref 1.5–8.1)
NRBC BLD-RTO: 0 PER 100 WBC
PLATELET # BLD AUTO: 219 K/UL (ref 138–453)
PMV BLD AUTO: 10.5 FL (ref 8.1–13.5)
POTASSIUM SERPL-SCNC: 4.3 MMOL/L (ref 3.7–5.3)
PROT SERPL-MCNC: 6.8 G/DL (ref 6.6–8.7)
RBC # BLD AUTO: 4.58 M/UL (ref 4.21–5.77)
SODIUM SERPL-SCNC: 138 MMOL/L (ref 136–145)
TIBC SERPL-MCNC: 264 UG/DL (ref 250–450)
UNSATURATED IRON BINDING CAPACITY: 192 UG/DL (ref 112–347)
VIT B12 SERPL-MCNC: 435 PG/ML (ref 232–1245)
WBC OTHER # BLD: 8.5 K/UL (ref 3.5–11.3)

## 2025-07-29 PROCEDURE — 82728 ASSAY OF FERRITIN: CPT

## 2025-07-29 PROCEDURE — 83550 IRON BINDING TEST: CPT

## 2025-07-29 PROCEDURE — 36415 COLL VENOUS BLD VENIPUNCTURE: CPT

## 2025-07-29 PROCEDURE — 80053 COMPREHEN METABOLIC PANEL: CPT

## 2025-07-29 PROCEDURE — 82746 ASSAY OF FOLIC ACID SERUM: CPT

## 2025-07-29 PROCEDURE — 85025 COMPLETE CBC W/AUTO DIFF WBC: CPT

## 2025-07-29 PROCEDURE — 83540 ASSAY OF IRON: CPT

## 2025-07-29 PROCEDURE — 82607 VITAMIN B-12: CPT

## 2025-07-31 ENCOUNTER — TELEPHONE (OUTPATIENT)
Age: 55
End: 2025-07-31

## 2025-07-31 ENCOUNTER — OFFICE VISIT (OUTPATIENT)
Age: 55
End: 2025-07-31
Payer: COMMERCIAL

## 2025-07-31 VITALS
SYSTOLIC BLOOD PRESSURE: 127 MMHG | WEIGHT: 173.8 LBS | HEART RATE: 79 BPM | TEMPERATURE: 98.1 F | DIASTOLIC BLOOD PRESSURE: 93 MMHG | RESPIRATION RATE: 16 BRPM | BODY MASS INDEX: 22.3 KG/M2 | HEIGHT: 74 IN

## 2025-07-31 DIAGNOSIS — C49.A3 GIST (GASTROINTESTINAL STROMAL TUMOR) OF SMALL BOWEL, MALIGNANT (HCC): Primary | ICD-10-CM

## 2025-07-31 DIAGNOSIS — E55.9 VITAMIN D DEFICIENCY: ICD-10-CM

## 2025-07-31 PROCEDURE — 99214 OFFICE O/P EST MOD 30 MIN: CPT | Performed by: INTERNAL MEDICINE

## 2025-07-31 RX ORDER — ZOLPIDEM TARTRATE 5 MG/1
5 TABLET ORAL NIGHTLY PRN
Qty: 30 TABLET | Refills: 0 | Status: SHIPPED | OUTPATIENT
Start: 2025-07-31 | End: 2025-08-30

## 2025-07-31 RX ORDER — ERGOCALCIFEROL 1.25 MG/1
50000 CAPSULE, LIQUID FILLED ORAL WEEKLY
Qty: 4 CAPSULE | Refills: 5 | Status: SHIPPED | OUTPATIENT
Start: 2025-07-31

## 2025-07-31 NOTE — TELEPHONE ENCOUNTER
ALPHONSE HERE FOR FOLLOW UP   RV about 2-3 months with CBC, CMP, Iron studies, vitamin B12/ folate, before RV  Dominique BARKSDALE VISIT: 9/11/25 @ 3:30PM   LABS WILL BE MAILED TO PT   AVS PRINTED AND GIVEN ON EXIT

## 2025-08-13 ENCOUNTER — TELEPHONE (OUTPATIENT)
Age: 55
End: 2025-08-13

## 2025-08-20 DIAGNOSIS — R10.33 PERIUMBILICAL ABDOMINAL PAIN: Primary | ICD-10-CM

## 2025-08-28 ENCOUNTER — HOSPITAL ENCOUNTER (OUTPATIENT)
Dept: CT IMAGING | Facility: CLINIC | Age: 55
Discharge: HOME | End: 2025-08-30
Attending: SURGERY
Payer: COMMERCIAL

## 2025-08-28 DIAGNOSIS — R10.33 PERIUMBILICAL ABDOMINAL PAIN: ICD-10-CM

## 2025-08-28 PROCEDURE — 74177 CT ABD & PELVIS W/CONTRAST: CPT

## 2025-08-28 PROCEDURE — 6360000004 HC RX CONTRAST MEDICATION: Performed by: SURGERY

## 2025-08-28 PROCEDURE — 2500000003 HC RX 250 WO HCPCS: Performed by: SURGERY

## 2025-08-28 RX ORDER — 0.9 % SODIUM CHLORIDE 0.9 %
80 INTRAVENOUS SOLUTION INTRAVENOUS ONCE
Status: DISCONTINUED | OUTPATIENT
Start: 2025-08-28 | End: 2025-08-31 | Stop reason: HOSPADM

## 2025-08-28 RX ORDER — IOPAMIDOL 755 MG/ML
75 INJECTION, SOLUTION INTRAVASCULAR
Status: COMPLETED | OUTPATIENT
Start: 2025-08-28 | End: 2025-08-28

## 2025-08-28 RX ORDER — SODIUM CHLORIDE 0.9 % (FLUSH) 0.9 %
10 SYRINGE (ML) INJECTION PRN
Status: DISCONTINUED | OUTPATIENT
Start: 2025-08-28 | End: 2025-08-31 | Stop reason: HOSPADM

## 2025-08-28 RX ADMIN — Medication 80 ML: at 10:52

## 2025-08-28 RX ADMIN — IOPAMIDOL 75 ML: 755 INJECTION, SOLUTION INTRAVENOUS at 10:51

## 2025-08-28 RX ADMIN — SODIUM CHLORIDE, PRESERVATIVE FREE 10 ML: 5 INJECTION INTRAVENOUS at 10:52

## 2025-09-03 ENCOUNTER — TELEPHONE (OUTPATIENT)
Dept: FAMILY MEDICINE CLINIC | Age: 55
End: 2025-09-03

## 2025-09-03 DIAGNOSIS — R30.0 DYSURIA: Primary | ICD-10-CM

## 2025-09-03 DIAGNOSIS — R10.9 ABDOMINAL PAIN, UNSPECIFIED ABDOMINAL LOCATION: ICD-10-CM

## 2025-09-04 DIAGNOSIS — R30.0 DYSURIA: Primary | ICD-10-CM

## 2025-09-05 ENCOUNTER — HOSPITAL ENCOUNTER (OUTPATIENT)
Dept: LAB | Facility: CLINIC | Age: 55
Discharge: HOME | End: 2025-09-05
Payer: COMMERCIAL

## 2025-09-05 DIAGNOSIS — R10.9 ABDOMINAL PAIN, UNSPECIFIED ABDOMINAL LOCATION: ICD-10-CM

## 2025-09-05 DIAGNOSIS — R30.0 DYSURIA: ICD-10-CM

## 2025-09-05 LAB
BILIRUB UR QL STRIP: NEGATIVE
CLARITY UR: CLEAR
COLOR UR: YELLOW
COMMENT: NORMAL
GLUCOSE UR STRIP-MCNC: NEGATIVE MG/DL
HGB UR QL STRIP.AUTO: NEGATIVE
KETONES UR STRIP-MCNC: NEGATIVE MG/DL
LEUKOCYTE ESTERASE UR QL STRIP: NEGATIVE
NITRITE UR QL STRIP: NEGATIVE
PH UR STRIP: 5.5 [PH] (ref 5–8)
PROT UR STRIP-MCNC: NEGATIVE MG/DL
SP GR UR STRIP: 1.03 (ref 1–1.03)
UROBILINOGEN UR STRIP-ACNC: NORMAL EU/DL (ref 0–1)

## 2025-09-05 PROCEDURE — 87086 URINE CULTURE/COLONY COUNT: CPT

## 2025-09-05 PROCEDURE — 81003 URINALYSIS AUTO W/O SCOPE: CPT

## 2025-09-06 LAB
MICROORGANISM SPEC CULT: NORMAL
SPECIMEN DESCRIPTION: NORMAL

## (undated) DEVICE — Device

## (undated) DEVICE — 1LYRTR 16FR10ML100%SIL UMS SNP: Brand: MEDLINE INDUSTRIES, INC.

## (undated) DEVICE — GAUZE,SPONGE,FLUFF,6"X6.75",STRL,5/TRAY: Brand: MEDLINE

## (undated) DEVICE — WOUND RETRACTOR AND PROTECTOR: Brand: ALEXIS O WOUND PROTECTOR-RETRACTOR

## (undated) DEVICE — BLOCK BITE 60FR RUBBER ADLT DENTAL

## (undated) DEVICE — CO2 CANNULA,SUPERSOFT, ADLT,7'O2,7'CO2: Brand: MEDLINE

## (undated) DEVICE — Device: Brand: DEFENDO VALVE AND CONNECTOR KIT

## (undated) DEVICE — GAUZE,SPONGE,4"X4",16PLY,STRL,LF,10/TRAY: Brand: MEDLINE

## (undated) DEVICE — FORCEPS BX L240CM JAW DIA2.4MM ORNG L CAP W/ NDL DISP RAD

## (undated) DEVICE — FILTER CLP DISP FOR 5513E CLIPVAC

## (undated) DEVICE — STAZ ENDO KIT: Brand: MEDLINE INDUSTRIES, INC.

## (undated) DEVICE — BINDER ABD 2XL W9IN CIRC 62 73IN 3 PNL E HK AND LOOP CLSR W

## (undated) DEVICE — TROCAR: Brand: KII SHIELDED BLADED ACCESS SYSTEM

## (undated) DEVICE — RELOAD STPL L75MM OPN H3.8MM CLS 1.5MM WIRE DIA0.2MM REG

## (undated) DEVICE — GOWN,AURORA,NONREINFORCED,LARGE: Brand: MEDLINE

## (undated) DEVICE — PAD,ABDOMINAL,5"X9",ST,LF,25/BX: Brand: MEDLINE INDUSTRIES, INC.

## (undated) DEVICE — GARMENT,MEDLINE,DVT,INT,CALF,MED, GEN2: Brand: MEDLINE

## (undated) DEVICE — SOLUTION ANTIFOG VIS SYS CLEARIFY LAPSCP

## (undated) DEVICE — LIQUIBAND RAPID ADHESIVE 36/CS 0.8ML: Brand: MEDLINE

## (undated) DEVICE — MEDICINE CUP, GRADUATED, STER: Brand: MEDLINE

## (undated) DEVICE — RELOAD STPL H1.5X3.6XL60MM REG TISS BLU B FRM AUTO RET PIN

## (undated) DEVICE — STAPLER INT L75MM CUT LN L73MM STPL LN L77MM BLU B FRM 8

## (undated) DEVICE — CONMED DISPOSABLE GASTROINTESTINAL CYTOLOGY BRUSH, STRAIGHT HANDLE, 2.5 MM X 160 CM: Brand: CONMED

## (undated) DEVICE — ADAPTER TBNG LUER STUB 15 GA INTMED

## (undated) DEVICE — CUP MED 1OZ CLR POLYPR FEED GRAD W/O LID

## (undated) DEVICE — GLOVE SURG SZ 8 L12IN THK91MIL BRN LTX FREE POLYCHLOROPRENE

## (undated) DEVICE — GLOVE SURG SZ 8 L11.77IN FNGR THK9.8MIL STRW LTX POLYMER

## (undated) DEVICE — SYRINGE MED 50ML LUERLOCK TIP

## (undated) DEVICE — TUBING, SUCTION, 1/4" X 12', STRAIGHT: Brand: MEDLINE

## (undated) DEVICE — STRAP,POSITIONING,KNEE/BODY,FOAM,4X60": Brand: MEDLINE

## (undated) DEVICE — DRESSING PETRO W3XL8IN OIL EMUL N ADH GZ KNIT IMPREG CELOS

## (undated) DEVICE — ELECTRODE PT RET AD L9FT HI MOIST COND ADH HYDRGEL CORDED

## (undated) DEVICE — TROCAR: Brand: KII® SLEEVE

## (undated) DEVICE — SUTURE PDS II SZ 0 L60IN ABSRB VLT L65MM TP-1 1/2 CIR Z991G

## (undated) DEVICE — BASIN EMSIS 700ML GRAPHITE PLAS KID SHP GRAD

## (undated) DEVICE — FORCEPS BX L240CM JAW DIA2.8MM L CAP W/ NDL MIC MESH TOOTH

## (undated) DEVICE — JELLY,LUBE,STERILE,FLIP TOP,TUBE,2-OZ: Brand: MEDLINE

## (undated) DEVICE — SEALER LAP SM L18.8CM OPN JAW HAND/FOOT SWCH FORCETRIAD

## (undated) DEVICE — SUTURE MONOCRYL SZ 4-0 L18IN ABSRB UD L16MM PC-3 3/8 CIR PRIM Y845G

## (undated) DEVICE — BITE BLOCK ENDOSCP AD 60 FR W/ ADJ STRP PLAS GRN BLOX

## (undated) DEVICE — TOWEL,OR,DSP,ST,NATURAL,DLX,4/PK,20PK/CS: Brand: MEDLINE

## (undated) DEVICE — STRAP ARMBRD W1.5XL32IN FOAM STR YET SFT W/ HK AND LOOP

## (undated) DEVICE — GOWN,SIRUS,NONRNF,SETINSLV,2XL,18/CS: Brand: MEDLINE

## (undated) DEVICE — SUTURE VICRYL SZ 3-0 L18IN ABSRB UD L26MM SH 1/2 CIR J864D